# Patient Record
Sex: MALE | Race: WHITE | NOT HISPANIC OR LATINO
[De-identification: names, ages, dates, MRNs, and addresses within clinical notes are randomized per-mention and may not be internally consistent; named-entity substitution may affect disease eponyms.]

---

## 2017-03-17 ENCOUNTER — RX RENEWAL (OUTPATIENT)
Age: 82
End: 2017-03-17

## 2017-04-26 ENCOUNTER — APPOINTMENT (OUTPATIENT)
Dept: INTERNAL MEDICINE | Facility: CLINIC | Age: 82
End: 2017-04-26

## 2017-04-27 ENCOUNTER — APPOINTMENT (OUTPATIENT)
Dept: INTERNAL MEDICINE | Facility: CLINIC | Age: 82
End: 2017-04-27

## 2017-04-27 ENCOUNTER — LABORATORY RESULT (OUTPATIENT)
Age: 82
End: 2017-04-27

## 2017-04-27 VITALS
DIASTOLIC BLOOD PRESSURE: 75 MMHG | RESPIRATION RATE: 16 BRPM | HEART RATE: 76 BPM | SYSTOLIC BLOOD PRESSURE: 130 MMHG | WEIGHT: 202 LBS | BODY MASS INDEX: 28.92 KG/M2 | HEIGHT: 70 IN

## 2017-04-27 DIAGNOSIS — M25.50 PAIN IN UNSPECIFIED JOINT: ICD-10-CM

## 2017-04-27 LAB
ALBUMIN SERPL ELPH-MCNC: 3.5 G/DL
ALP BLD-CCNC: 488 U/L
ALT SERPL-CCNC: 52 U/L
ANION GAP SERPL CALC-SCNC: 16 MMOL/L
AST SERPL-CCNC: 42 U/L
BASOPHILS # BLD AUTO: 0.02 K/UL
BASOPHILS NFR BLD AUTO: 0.2 %
BILIRUB SERPL-MCNC: 0.6 MG/DL
BUN SERPL-MCNC: 38 MG/DL
CALCIUM SERPL-MCNC: 9.2 MG/DL
CHLORIDE SERPL-SCNC: 102 MMOL/L
CO2 SERPL-SCNC: 24 MMOL/L
CREAT SERPL-MCNC: 1.28 MG/DL
EOSINOPHIL # BLD AUTO: 0.13 K/UL
EOSINOPHIL NFR BLD AUTO: 1.5 %
GLUCOSE SERPL-MCNC: 198 MG/DL
HBA1C MFR BLD HPLC: 7.3 %
HCT VFR BLD CALC: 31.7 %
HGB BLD-MCNC: 10.3 G/DL
IMM GRANULOCYTES NFR BLD AUTO: 0.6 %
LYMPHOCYTES # BLD AUTO: 0.57 K/UL
LYMPHOCYTES NFR BLD AUTO: 6.5 %
MAN DIFF?: NORMAL
MCHC RBC-ENTMCNC: 28.6 PG
MCHC RBC-ENTMCNC: 32.5 GM/DL
MCV RBC AUTO: 88.1 FL
MONOCYTES # BLD AUTO: 0.56 K/UL
MONOCYTES NFR BLD AUTO: 6.4 %
NEUTROPHILS # BLD AUTO: 7.47 K/UL
NEUTROPHILS NFR BLD AUTO: 84.8 %
PLATELET # BLD AUTO: 141 K/UL
POTASSIUM SERPL-SCNC: 4.3 MMOL/L
PROT SERPL-MCNC: 7 G/DL
RBC # BLD: 3.6 M/UL
RBC # FLD: 16 %
SODIUM SERPL-SCNC: 142 MMOL/L
URATE SERPL-MCNC: 9.5 MG/DL
WBC # FLD AUTO: 8.8 K/UL

## 2017-04-28 ENCOUNTER — APPOINTMENT (OUTPATIENT)
Dept: CARDIOLOGY | Facility: CLINIC | Age: 82
End: 2017-04-28

## 2017-04-28 ENCOUNTER — NON-APPOINTMENT (OUTPATIENT)
Age: 82
End: 2017-04-28

## 2017-04-28 VITALS
HEIGHT: 70 IN | BODY MASS INDEX: 28.77 KG/M2 | SYSTOLIC BLOOD PRESSURE: 128 MMHG | WEIGHT: 201 LBS | DIASTOLIC BLOOD PRESSURE: 62 MMHG | HEART RATE: 60 BPM

## 2017-04-29 DIAGNOSIS — R76.8 OTHER SPECIFIED ABNORMAL IMMUNOLOGICAL FINDINGS IN SERUM: ICD-10-CM

## 2017-04-30 ENCOUNTER — FORM ENCOUNTER (OUTPATIENT)
Age: 82
End: 2017-04-30

## 2017-05-01 ENCOUNTER — OUTPATIENT (OUTPATIENT)
Dept: OUTPATIENT SERVICES | Facility: HOSPITAL | Age: 82
LOS: 1 days | End: 2017-05-01
Payer: MEDICARE

## 2017-05-01 ENCOUNTER — APPOINTMENT (OUTPATIENT)
Dept: CT IMAGING | Facility: CLINIC | Age: 82
End: 2017-05-01

## 2017-05-01 DIAGNOSIS — Z98.89 OTHER SPECIFIED POSTPROCEDURAL STATES: Chronic | ICD-10-CM

## 2017-05-01 DIAGNOSIS — R94.5 ABNORMAL RESULTS OF LIVER FUNCTION STUDIES: ICD-10-CM

## 2017-05-01 LAB
ALBUMIN MFR SERPL ELPH: 46.4 %
ALBUMIN SERPL ELPH-MCNC: 3.7 G/DL
ALBUMIN SERPL-MCNC: 3.1 G/DL
ALBUMIN/GLOB SERPL: 0.9 RATIO
ALP BLD-CCNC: 530 U/L
ALPHA1 GLOB MFR SERPL ELPH: 6.7 %
ALPHA1 GLOB SERPL ELPH-MCNC: 0.4 G/DL
ALPHA2 GLOB MFR SERPL ELPH: 16.1 %
ALPHA2 GLOB SERPL ELPH-MCNC: 1.1 G/DL
ALT SERPL-CCNC: 54 U/L
ANA PAT FLD IF-IMP: ABNORMAL
ANA SER IF-ACNC: ABNORMAL
AST SERPL-CCNC: 38 U/L
B-GLOBULIN MFR SERPL ELPH: 20.1 %
B-GLOBULIN SERPL ELPH-MCNC: 1.3 G/DL
BILIRUB DIRECT SERPL-MCNC: 0.2 MG/DL
BILIRUB INDIRECT SERPL-MCNC: 0.2 MG/DL
BILIRUB SERPL-MCNC: 0.4 MG/DL
CRP SERPL-MCNC: 2.54 MG/DL
DEPRECATED KAPPA LC FREE/LAMBDA SER: 1.29 RATIO
GAMMA GLOB FLD ELPH-MCNC: 0.7 G/DL
GAMMA GLOB MFR SERPL ELPH: 10.7 %
GGT SERPL-CCNC: 400 U/L
IGA SER QL IEP: 173 MG/DL
IGG SER QL IEP: 840 MG/DL
IGM SER QL IEP: 132 MG/DL
INTERPRETATION SERPL IEP-IMP: NORMAL
KAPPA LC CSF-MCNC: 2.89 MG/DL
KAPPA LC SERPL-MCNC: 3.72 MG/DL
M PROTEIN MFR SERPL ELPH: 0 %
M PROTEIN SPEC IFE-MCNC: NORMAL
MONOCLON BAND OBS SERPL: 0 G/DL
PROT SERPL-MCNC: 6.7 G/DL
RHEUMATOID FACT SER QL: <7 IU/ML

## 2017-05-01 PROCEDURE — 74177 CT ABD & PELVIS W/CONTRAST: CPT

## 2017-05-01 PROCEDURE — 71260 CT THORAX DX C+: CPT

## 2017-05-02 LAB
ALP BLD-CCNC: 458 U/L
ALP BONE CFR SERPL: 13 %
ALP INTEST CFR SERPL: 0 %
ALP LIVER CFR SERPL: 66 %
ALP MACRO CFR SERPL: 20 %
ALP PLAC CFR SERPL: 0 %

## 2017-05-08 ENCOUNTER — RX RENEWAL (OUTPATIENT)
Age: 82
End: 2017-05-08

## 2017-05-09 ENCOUNTER — APPOINTMENT (OUTPATIENT)
Dept: INTERNAL MEDICINE | Facility: CLINIC | Age: 82
End: 2017-05-09

## 2017-05-09 LAB
A1AT SERPL-MCNC: 215 MG/DL
AFP-TM SERPL-MCNC: 1.2 NG/ML
CEA SERPL-MCNC: 1.7 NG/ML
CERULOPLASMIN SERPL-MCNC: 29 MG/DL
DSDNA AB SER-ACNC: <12 IU/ML
FERRITIN SERPL-MCNC: 391 NG/ML
GLIADIN IGA SER QL: <5 UNITS
GLIADIN IGG SER QL: <5 UNITS
GLIADIN PEPTIDE IGA SER-ACNC: NEGATIVE
GLIADIN PEPTIDE IGG SER-ACNC: NEGATIVE
IRON SATN MFR SERPL: 17 %
IRON SERPL-MCNC: 42 UG/DL
TIBC SERPL-MCNC: 252 UG/DL
TTG IGA SER IA-ACNC: <5 UNITS
TTG IGA SER-ACNC: NEGATIVE
TTG IGG SER IA-ACNC: <5 UNITS
TTG IGG SER IA-ACNC: NEGATIVE
UIBC SERPL-MCNC: 210 UG/DL

## 2017-05-10 LAB
ENDOMYSIUM IGA SER QL: NORMAL
ENDOMYSIUM IGA TITR SER: NORMAL

## 2017-05-15 ENCOUNTER — RX RENEWAL (OUTPATIENT)
Age: 82
End: 2017-05-15

## 2017-05-15 ENCOUNTER — APPOINTMENT (OUTPATIENT)
Dept: INTERNAL MEDICINE | Facility: CLINIC | Age: 82
End: 2017-05-15

## 2017-05-15 ENCOUNTER — CHART COPY (OUTPATIENT)
Age: 82
End: 2017-05-15

## 2017-05-15 VITALS — HEIGHT: 70 IN

## 2017-05-15 LAB — MITOCHONDRIA AB SER IF-ACNC: ABNORMAL

## 2017-05-15 RX ORDER — BLOOD-GLUCOSE METER
W/DEVICE EACH MISCELLANEOUS
Qty: 1 | Refills: 0 | Status: ACTIVE | COMMUNITY
Start: 2017-05-09

## 2017-05-30 ENCOUNTER — RESULT REVIEW (OUTPATIENT)
Age: 82
End: 2017-05-30

## 2017-05-30 ENCOUNTER — OUTPATIENT (OUTPATIENT)
Dept: OUTPATIENT SERVICES | Facility: HOSPITAL | Age: 82
LOS: 1 days | End: 2017-05-30
Payer: MEDICARE

## 2017-05-30 ENCOUNTER — OUTPATIENT (OUTPATIENT)
Dept: OUTPATIENT SERVICES | Facility: HOSPITAL | Age: 82
LOS: 1 days | Discharge: HOME | End: 2017-05-30

## 2017-05-30 ENCOUNTER — APPOINTMENT (OUTPATIENT)
Dept: HEMATOLOGY ONCOLOGY | Facility: CLINIC | Age: 82
End: 2017-05-30

## 2017-05-30 DIAGNOSIS — Z98.89 OTHER SPECIFIED POSTPROCEDURAL STATES: Chronic | ICD-10-CM

## 2017-05-30 DIAGNOSIS — D64.9 ANEMIA, UNSPECIFIED: ICD-10-CM

## 2017-05-30 LAB
BASOPHILS # BLD AUTO: 0 K/UL — SIGNIFICANT CHANGE UP (ref 0–0.2)
BASOPHILS NFR BLD AUTO: 0 % — SIGNIFICANT CHANGE UP (ref 0–2)
BLD GP AB SCN SERPL QL: NEGATIVE — SIGNIFICANT CHANGE UP
EOSINOPHIL # BLD AUTO: 0.1 K/UL — SIGNIFICANT CHANGE UP (ref 0–0.5)
EOSINOPHIL NFR BLD AUTO: 1.3 % — SIGNIFICANT CHANGE UP (ref 0–6)
HCT VFR BLD CALC: 23.1 % — LOW (ref 39–50)
HGB BLD-MCNC: 7.7 G/DL — LOW (ref 13–17)
LYMPHOCYTES # BLD AUTO: 0.3 K/UL — LOW (ref 1–3.3)
LYMPHOCYTES # BLD AUTO: 5.3 % — LOW (ref 13–44)
MCHC RBC-ENTMCNC: 30.2 PG — SIGNIFICANT CHANGE UP (ref 27–34)
MCHC RBC-ENTMCNC: 33.2 G/DL — SIGNIFICANT CHANGE UP (ref 32–36)
MCV RBC AUTO: 91 FL — SIGNIFICANT CHANGE UP (ref 80–100)
MONOCYTES # BLD AUTO: 0.4 K/UL — SIGNIFICANT CHANGE UP (ref 0–0.9)
MONOCYTES NFR BLD AUTO: 7.7 % — SIGNIFICANT CHANGE UP (ref 2–14)
NEUTROPHILS # BLD AUTO: 5 K/UL — SIGNIFICANT CHANGE UP (ref 1.8–7.4)
NEUTROPHILS NFR BLD AUTO: 85.7 % — HIGH (ref 43–77)
PLATELET # BLD AUTO: 79 K/UL — LOW (ref 150–400)
RBC # BLD: 2.54 M/UL — LOW (ref 4.2–5.8)
RBC # FLD: 15.4 % — HIGH (ref 10.3–14.5)
RH IG SCN BLD-IMP: POSITIVE — SIGNIFICANT CHANGE UP
WBC # BLD: 5.8 K/UL — SIGNIFICANT CHANGE UP (ref 3.8–10.5)
WBC # FLD AUTO: 5.8 K/UL — SIGNIFICANT CHANGE UP (ref 3.8–10.5)

## 2017-06-01 ENCOUNTER — APPOINTMENT (OUTPATIENT)
Dept: INFUSION THERAPY | Facility: HOSPITAL | Age: 82
End: 2017-06-01

## 2017-06-02 DIAGNOSIS — Z51.89 ENCOUNTER FOR OTHER SPECIFIED AFTERCARE: ICD-10-CM

## 2017-06-09 ENCOUNTER — APPOINTMENT (OUTPATIENT)
Dept: HEMATOLOGY ONCOLOGY | Facility: CLINIC | Age: 82
End: 2017-06-09

## 2017-06-09 LAB
BLD GP AB SCN SERPL QL: NEGATIVE — SIGNIFICANT CHANGE UP
RH IG SCN BLD-IMP: POSITIVE — SIGNIFICANT CHANGE UP

## 2017-06-11 ENCOUNTER — APPOINTMENT (OUTPATIENT)
Dept: INFUSION THERAPY | Facility: HOSPITAL | Age: 82
End: 2017-06-11

## 2017-06-15 ENCOUNTER — APPOINTMENT (OUTPATIENT)
Dept: HEMATOLOGY ONCOLOGY | Facility: CLINIC | Age: 82
End: 2017-06-15

## 2017-06-17 ENCOUNTER — APPOINTMENT (OUTPATIENT)
Dept: INFUSION THERAPY | Facility: HOSPITAL | Age: 82
End: 2017-06-17

## 2017-06-20 DIAGNOSIS — N18.3 CHRONIC KIDNEY DISEASE, STAGE 3 (MODERATE): ICD-10-CM

## 2017-06-20 DIAGNOSIS — E11.9 TYPE 2 DIABETES MELLITUS WITHOUT COMPLICATIONS: ICD-10-CM

## 2017-06-20 DIAGNOSIS — E11.59 TYPE 2 DIABETES MELLITUS WITH OTHER CIRCULATORY COMPLICATIONS: ICD-10-CM

## 2017-06-26 ENCOUNTER — RX RENEWAL (OUTPATIENT)
Age: 82
End: 2017-06-26

## 2017-06-26 ENCOUNTER — APPOINTMENT (OUTPATIENT)
Dept: INTERNAL MEDICINE | Facility: CLINIC | Age: 82
End: 2017-06-26

## 2017-06-26 LAB
ALBUMIN SERPL ELPH-MCNC: 3.1 G/DL
ALP BLD-CCNC: 188 U/L
ALT SERPL-CCNC: 13 U/L
AST SERPL-CCNC: 18 U/L
BILIRUB DIRECT SERPL-MCNC: 0.2 MG/DL
BILIRUB INDIRECT SERPL-MCNC: 0.3 MG/DL
BILIRUB SERPL-MCNC: 0.5 MG/DL
CHOLEST SERPL-MCNC: 80 MG/DL
CHOLEST/HDLC SERPL: 3.3 RATIO
GGT SERPL-CCNC: 88 U/L
HDLC SERPL-MCNC: 24 MG/DL
LDLC SERPL CALC-MCNC: 37 MG/DL
PROT SERPL-MCNC: 6.1 G/DL
TRIGL SERPL-MCNC: 95 MG/DL

## 2017-06-28 ENCOUNTER — APPOINTMENT (OUTPATIENT)
Dept: HEMATOLOGY ONCOLOGY | Facility: CLINIC | Age: 82
End: 2017-06-28

## 2017-06-28 PROCEDURE — 86901 BLOOD TYPING SEROLOGIC RH(D): CPT

## 2017-06-28 PROCEDURE — 86900 BLOOD TYPING SEROLOGIC ABO: CPT

## 2017-06-28 PROCEDURE — 86923 COMPATIBILITY TEST ELECTRIC: CPT

## 2017-06-28 PROCEDURE — 86850 RBC ANTIBODY SCREEN: CPT

## 2017-06-29 ENCOUNTER — OUTPATIENT (OUTPATIENT)
Dept: OUTPATIENT SERVICES | Facility: HOSPITAL | Age: 82
LOS: 1 days | Discharge: HOME | End: 2017-06-29

## 2017-06-29 ENCOUNTER — OUTPATIENT (OUTPATIENT)
Dept: OUTPATIENT SERVICES | Facility: HOSPITAL | Age: 82
LOS: 1 days | End: 2017-06-29
Payer: MEDICARE

## 2017-06-29 DIAGNOSIS — D64.9 ANEMIA, UNSPECIFIED: ICD-10-CM

## 2017-06-29 DIAGNOSIS — Z98.89 OTHER SPECIFIED POSTPROCEDURAL STATES: Chronic | ICD-10-CM

## 2017-07-01 ENCOUNTER — APPOINTMENT (OUTPATIENT)
Dept: INFUSION THERAPY | Facility: HOSPITAL | Age: 82
End: 2017-07-01

## 2017-07-03 DIAGNOSIS — Z51.89 ENCOUNTER FOR OTHER SPECIFIED AFTERCARE: ICD-10-CM

## 2017-07-03 DIAGNOSIS — I48.0 PAROXYSMAL ATRIAL FIBRILLATION: ICD-10-CM

## 2017-07-03 DIAGNOSIS — E11.59 TYPE 2 DIABETES MELLITUS WITH OTHER CIRCULATORY COMPLICATIONS: ICD-10-CM

## 2017-07-03 DIAGNOSIS — R11.2 NAUSEA WITH VOMITING, UNSPECIFIED: ICD-10-CM

## 2017-07-10 RX ORDER — CARVEDILOL PHOSPHATE 80 MG/1
0 CAPSULE, EXTENDED RELEASE ORAL
Qty: 180 | Refills: 0 | COMMUNITY
Start: 2017-07-10

## 2017-07-10 RX ORDER — CARVEDILOL PHOSPHATE 80 MG/1
1 CAPSULE, EXTENDED RELEASE ORAL
Qty: 180 | Refills: 0 | DISCHARGE
Start: 2017-07-10

## 2017-07-13 ENCOUNTER — APPOINTMENT (OUTPATIENT)
Dept: HEMATOLOGY ONCOLOGY | Facility: CLINIC | Age: 82
End: 2017-07-13

## 2017-07-15 ENCOUNTER — APPOINTMENT (OUTPATIENT)
Dept: INFUSION THERAPY | Facility: HOSPITAL | Age: 82
End: 2017-07-15

## 2017-07-20 ENCOUNTER — APPOINTMENT (OUTPATIENT)
Dept: INTERNAL MEDICINE | Facility: CLINIC | Age: 82
End: 2017-07-20

## 2017-07-20 ENCOUNTER — APPOINTMENT (OUTPATIENT)
Dept: HEMATOLOGY ONCOLOGY | Facility: CLINIC | Age: 82
End: 2017-07-20

## 2017-07-20 VITALS — DIASTOLIC BLOOD PRESSURE: 60 MMHG | SYSTOLIC BLOOD PRESSURE: 130 MMHG | HEART RATE: 80 BPM | RESPIRATION RATE: 18 BRPM

## 2017-07-20 LAB
BLD GP AB SCN SERPL QL: NEGATIVE — SIGNIFICANT CHANGE UP
DATE COLLECTED: NORMAL
HEMOCCULT SP1 STL QL: POSITIVE
RH IG SCN BLD-IMP: POSITIVE — SIGNIFICANT CHANGE UP

## 2017-07-20 RX ORDER — SODIUM SULFATE, POTASSIUM SULFATE, MAGNESIUM SULFATE 17.5; 3.13; 1.6 G/ML; G/ML; G/ML
17.5-3.13-1.6 SOLUTION, CONCENTRATE ORAL
Qty: 354 | Refills: 0 | Status: DISCONTINUED | COMMUNITY
Start: 2017-06-02

## 2017-07-22 ENCOUNTER — APPOINTMENT (OUTPATIENT)
Dept: INFUSION THERAPY | Facility: HOSPITAL | Age: 82
End: 2017-07-22

## 2017-07-24 ENCOUNTER — APPOINTMENT (OUTPATIENT)
Dept: INTERNAL MEDICINE | Facility: CLINIC | Age: 82
End: 2017-07-24

## 2017-07-24 VITALS — HEART RATE: 80 BPM | DIASTOLIC BLOOD PRESSURE: 68 MMHG | SYSTOLIC BLOOD PRESSURE: 120 MMHG | RESPIRATION RATE: 16 BRPM

## 2017-07-24 LAB — HEMOGLOBIN: 8.2

## 2017-07-27 ENCOUNTER — OUTPATIENT (OUTPATIENT)
Dept: OUTPATIENT SERVICES | Facility: HOSPITAL | Age: 82
LOS: 1 days | Discharge: HOME | End: 2017-07-27

## 2017-07-27 ENCOUNTER — APPOINTMENT (OUTPATIENT)
Dept: CARDIOLOGY | Facility: CLINIC | Age: 82
End: 2017-07-27
Payer: MEDICARE

## 2017-07-27 ENCOUNTER — NON-APPOINTMENT (OUTPATIENT)
Age: 82
End: 2017-07-27

## 2017-07-27 VITALS
DIASTOLIC BLOOD PRESSURE: 65 MMHG | OXYGEN SATURATION: 95 % | HEART RATE: 95 BPM | WEIGHT: 222 LBS | BODY MASS INDEX: 31.78 KG/M2 | SYSTOLIC BLOOD PRESSURE: 130 MMHG | HEIGHT: 70 IN | TEMPERATURE: 97.9 F

## 2017-07-27 DIAGNOSIS — I10 ESSENTIAL (PRIMARY) HYPERTENSION: ICD-10-CM

## 2017-07-27 DIAGNOSIS — Z98.89 OTHER SPECIFIED POSTPROCEDURAL STATES: Chronic | ICD-10-CM

## 2017-07-27 DIAGNOSIS — D64.9 ANEMIA, UNSPECIFIED: ICD-10-CM

## 2017-07-27 PROCEDURE — 99215 OFFICE O/P EST HI 40 MIN: CPT

## 2017-07-27 PROCEDURE — 93000 ELECTROCARDIOGRAM COMPLETE: CPT

## 2017-07-28 ENCOUNTER — RESULT REVIEW (OUTPATIENT)
Age: 82
End: 2017-07-28

## 2017-07-28 ENCOUNTER — APPOINTMENT (OUTPATIENT)
Dept: HEMATOLOGY ONCOLOGY | Facility: CLINIC | Age: 82
End: 2017-07-28

## 2017-07-28 PROCEDURE — 86900 BLOOD TYPING SEROLOGIC ABO: CPT

## 2017-07-28 PROCEDURE — 86850 RBC ANTIBODY SCREEN: CPT

## 2017-07-28 PROCEDURE — 86901 BLOOD TYPING SEROLOGIC RH(D): CPT

## 2017-07-28 PROCEDURE — 86923 COMPATIBILITY TEST ELECTRIC: CPT

## 2017-07-30 ENCOUNTER — OUTPATIENT (OUTPATIENT)
Dept: OUTPATIENT SERVICES | Facility: HOSPITAL | Age: 82
LOS: 1 days | End: 2017-07-30
Payer: MEDICARE

## 2017-07-30 DIAGNOSIS — Z98.89 OTHER SPECIFIED POSTPROCEDURAL STATES: Chronic | ICD-10-CM

## 2017-07-31 ENCOUNTER — APPOINTMENT (OUTPATIENT)
Dept: INFUSION THERAPY | Facility: HOSPITAL | Age: 82
End: 2017-07-31

## 2017-08-01 DIAGNOSIS — Z51.89 ENCOUNTER FOR OTHER SPECIFIED AFTERCARE: ICD-10-CM

## 2017-08-03 ENCOUNTER — APPOINTMENT (OUTPATIENT)
Dept: INTERNAL MEDICINE | Facility: CLINIC | Age: 82
End: 2017-08-03

## 2017-08-04 ENCOUNTER — RESULT REVIEW (OUTPATIENT)
Age: 82
End: 2017-08-04

## 2017-08-04 ENCOUNTER — APPOINTMENT (OUTPATIENT)
Dept: HEMATOLOGY ONCOLOGY | Facility: CLINIC | Age: 82
End: 2017-08-04

## 2017-08-04 ENCOUNTER — CLINICAL ADVICE (OUTPATIENT)
Age: 82
End: 2017-08-04

## 2017-08-04 DIAGNOSIS — D64.9 ANEMIA, UNSPECIFIED: ICD-10-CM

## 2017-08-07 ENCOUNTER — APPOINTMENT (OUTPATIENT)
Age: 82
End: 2017-08-07

## 2017-08-08 DIAGNOSIS — R11.2 NAUSEA WITH VOMITING, UNSPECIFIED: ICD-10-CM

## 2017-08-10 ENCOUNTER — APPOINTMENT (OUTPATIENT)
Dept: INTERNAL MEDICINE | Facility: CLINIC | Age: 82
End: 2017-08-10
Payer: MEDICARE

## 2017-08-10 VITALS
WEIGHT: 234 LBS | DIASTOLIC BLOOD PRESSURE: 60 MMHG | RESPIRATION RATE: 18 BRPM | BODY MASS INDEX: 33.58 KG/M2 | HEART RATE: 80 BPM | SYSTOLIC BLOOD PRESSURE: 120 MMHG

## 2017-08-10 LAB — HEMOGLOBIN: 7

## 2017-08-10 PROCEDURE — 85018 HEMOGLOBIN: CPT | Mod: QW

## 2017-08-10 PROCEDURE — 99215 OFFICE O/P EST HI 40 MIN: CPT | Mod: 25

## 2017-08-10 RX ORDER — FUROSEMIDE 40 MG
0 TABLET ORAL
Qty: 90 | Refills: 0 | COMMUNITY
Start: 2017-08-10

## 2017-08-10 RX ORDER — FUROSEMIDE 40 MG
1 TABLET ORAL
Qty: 90 | Refills: 0 | COMMUNITY
Start: 2017-08-10

## 2017-08-11 ENCOUNTER — APPOINTMENT (OUTPATIENT)
Dept: HEMATOLOGY ONCOLOGY | Facility: CLINIC | Age: 82
End: 2017-08-11

## 2017-08-14 ENCOUNTER — APPOINTMENT (OUTPATIENT)
Age: 82
End: 2017-08-14

## 2017-08-14 ENCOUNTER — RX RENEWAL (OUTPATIENT)
Age: 82
End: 2017-08-14

## 2017-08-17 ENCOUNTER — RX RENEWAL (OUTPATIENT)
Age: 82
End: 2017-08-17

## 2017-08-18 ENCOUNTER — APPOINTMENT (OUTPATIENT)
Dept: HEMATOLOGY ONCOLOGY | Facility: CLINIC | Age: 82
End: 2017-08-18

## 2017-08-18 ENCOUNTER — APPOINTMENT (OUTPATIENT)
Dept: INTERNAL MEDICINE | Facility: CLINIC | Age: 82
End: 2017-08-18

## 2017-08-18 ENCOUNTER — APPOINTMENT (OUTPATIENT)
Dept: INTERNAL MEDICINE | Facility: CLINIC | Age: 82
End: 2017-08-18
Payer: MEDICARE

## 2017-08-18 ENCOUNTER — RESULT REVIEW (OUTPATIENT)
Age: 82
End: 2017-08-18

## 2017-08-18 VITALS
RESPIRATION RATE: 16 BRPM | BODY MASS INDEX: 32.86 KG/M2 | DIASTOLIC BLOOD PRESSURE: 60 MMHG | WEIGHT: 229 LBS | SYSTOLIC BLOOD PRESSURE: 110 MMHG | HEART RATE: 80 BPM

## 2017-08-18 DIAGNOSIS — S80.829A BLISTER (NONTHERMAL), UNSPECIFIED LOWER LEG, INITIAL ENCOUNTER: ICD-10-CM

## 2017-08-18 LAB
BASOPHILS # BLD AUTO: 0 K/UL — SIGNIFICANT CHANGE UP (ref 0–0.2)
BASOPHILS NFR BLD AUTO: 0 % — SIGNIFICANT CHANGE UP (ref 0–2)
BLD GP AB SCN SERPL QL: NEGATIVE — SIGNIFICANT CHANGE UP
EOSINOPHIL # BLD AUTO: 0.2 K/UL — SIGNIFICANT CHANGE UP (ref 0–0.5)
EOSINOPHIL NFR BLD AUTO: 2.8 % — SIGNIFICANT CHANGE UP (ref 0–6)
HCT VFR BLD CALC: 21.7 % — LOW (ref 39–50)
HGB BLD-MCNC: 7.3 G/DL — LOW (ref 13–17)
LYMPHOCYTES # BLD AUTO: 0.5 K/UL — LOW (ref 1–3.3)
LYMPHOCYTES # BLD AUTO: 7.7 % — LOW (ref 13–44)
MCHC RBC-ENTMCNC: 31.4 PG — SIGNIFICANT CHANGE UP (ref 27–34)
MCHC RBC-ENTMCNC: 33.5 G/DL — SIGNIFICANT CHANGE UP (ref 32–36)
MCV RBC AUTO: 93.7 FL — SIGNIFICANT CHANGE UP (ref 80–100)
MONOCYTES # BLD AUTO: 0.2 K/UL — SIGNIFICANT CHANGE UP (ref 0–0.9)
MONOCYTES NFR BLD AUTO: 3.1 % — SIGNIFICANT CHANGE UP (ref 2–14)
NEUTROPHILS # BLD AUTO: 5.2 K/UL — SIGNIFICANT CHANGE UP (ref 1.8–7.4)
NEUTROPHILS NFR BLD AUTO: 86.5 % — HIGH (ref 43–77)
PLATELET # BLD AUTO: 109 K/UL — LOW (ref 150–400)
RBC # BLD: 2.32 M/UL — LOW (ref 4.2–5.8)
RBC # FLD: 17.3 % — HIGH (ref 10.3–14.5)
RH IG SCN BLD-IMP: POSITIVE — SIGNIFICANT CHANGE UP
WBC # BLD: 6 K/UL — SIGNIFICANT CHANGE UP (ref 3.8–10.5)
WBC # FLD AUTO: 6 K/UL — SIGNIFICANT CHANGE UP (ref 3.8–10.5)

## 2017-08-18 PROCEDURE — 99214 OFFICE O/P EST MOD 30 MIN: CPT

## 2017-08-18 RX ORDER — INSULIN ASPART 100 [IU]/ML
100 INJECTION, SOLUTION INTRAVENOUS; SUBCUTANEOUS
Qty: 1 | Refills: 5 | Status: DISCONTINUED | COMMUNITY
Start: 2017-05-15 | End: 2017-08-18

## 2017-08-19 ENCOUNTER — APPOINTMENT (OUTPATIENT)
Dept: INFUSION THERAPY | Facility: HOSPITAL | Age: 82
End: 2017-08-19

## 2017-08-22 ENCOUNTER — APPOINTMENT (OUTPATIENT)
Dept: HEMATOLOGY ONCOLOGY | Facility: CLINIC | Age: 82
End: 2017-08-22

## 2017-08-22 DIAGNOSIS — K92.2 GASTROINTESTINAL HEMORRHAGE, UNSPECIFIED: ICD-10-CM

## 2017-08-23 PROCEDURE — 86901 BLOOD TYPING SEROLOGIC RH(D): CPT

## 2017-08-23 PROCEDURE — 86900 BLOOD TYPING SEROLOGIC ABO: CPT

## 2017-08-23 PROCEDURE — 86850 RBC ANTIBODY SCREEN: CPT

## 2017-08-23 PROCEDURE — 86923 COMPATIBILITY TEST ELECTRIC: CPT

## 2017-08-24 ENCOUNTER — APPOINTMENT (OUTPATIENT)
Age: 82
End: 2017-08-24

## 2017-08-31 ENCOUNTER — OUTPATIENT (OUTPATIENT)
Dept: OUTPATIENT SERVICES | Facility: HOSPITAL | Age: 82
LOS: 1 days | Discharge: HOME | End: 2017-08-31

## 2017-08-31 DIAGNOSIS — D64.9 ANEMIA, UNSPECIFIED: ICD-10-CM

## 2017-08-31 DIAGNOSIS — Z98.89 OTHER SPECIFIED POSTPROCEDURAL STATES: Chronic | ICD-10-CM

## 2017-09-01 ENCOUNTER — OUTPATIENT (OUTPATIENT)
Dept: OUTPATIENT SERVICES | Facility: HOSPITAL | Age: 82
LOS: 1 days | End: 2017-09-01
Payer: MEDICARE

## 2017-09-01 ENCOUNTER — APPOINTMENT (OUTPATIENT)
Dept: HEMATOLOGY ONCOLOGY | Facility: CLINIC | Age: 82
End: 2017-09-01

## 2017-09-01 DIAGNOSIS — D64.9 ANEMIA, UNSPECIFIED: ICD-10-CM

## 2017-09-01 DIAGNOSIS — Z98.89 OTHER SPECIFIED POSTPROCEDURAL STATES: Chronic | ICD-10-CM

## 2017-09-01 DIAGNOSIS — K92.2 GASTROINTESTINAL HEMORRHAGE, UNSPECIFIED: ICD-10-CM

## 2017-09-01 LAB
ANTIBODY ID 1_1: SIGNIFICANT CHANGE UP
ANTIBODY ID 1_2: SIGNIFICANT CHANGE UP
BLD GP AB SCN SERPL QL: POSITIVE — SIGNIFICANT CHANGE UP
DAT POLY-SP REAG RBC QL: NEGATIVE — SIGNIFICANT CHANGE UP
RH IG SCN BLD-IMP: POSITIVE — SIGNIFICANT CHANGE UP

## 2017-09-01 PROCEDURE — 86880 COOMBS TEST DIRECT: CPT

## 2017-09-01 PROCEDURE — 86870 RBC ANTIBODY IDENTIFICATION: CPT

## 2017-09-01 PROCEDURE — 86850 RBC ANTIBODY SCREEN: CPT

## 2017-09-01 PROCEDURE — 86077 PHYS BLOOD BANK SERV XMATCH: CPT

## 2017-09-01 PROCEDURE — 86900 BLOOD TYPING SEROLOGIC ABO: CPT

## 2017-09-01 PROCEDURE — 86901 BLOOD TYPING SEROLOGIC RH(D): CPT

## 2017-09-02 ENCOUNTER — OTHER (OUTPATIENT)
Age: 82
End: 2017-09-02

## 2017-09-02 ENCOUNTER — APPOINTMENT (OUTPATIENT)
Age: 82
End: 2017-09-02

## 2017-09-02 RX ORDER — DILTIAZEM HCL 120 MG
0 CAPSULE, EXT RELEASE 24 HR ORAL
Qty: 90 | Refills: 0 | COMMUNITY
Start: 2017-09-02

## 2017-09-02 RX ORDER — OCTREOTIDE ACETATE 200 UG/ML
0 INJECTION, SOLUTION INTRAVENOUS; SUBCUTANEOUS
Qty: 60 | Refills: 0 | DISCHARGE
Start: 2017-09-02

## 2017-09-05 ENCOUNTER — INPATIENT (INPATIENT)
Facility: HOSPITAL | Age: 82
LOS: 1 days | Discharge: HOME | DRG: 378 | End: 2017-09-07
Attending: HOSPITALIST | Admitting: HOSPITALIST
Payer: MEDICARE

## 2017-09-05 ENCOUNTER — APPOINTMENT (OUTPATIENT)
Dept: INTERNAL MEDICINE | Facility: CLINIC | Age: 82
End: 2017-09-05

## 2017-09-05 VITALS
DIASTOLIC BLOOD PRESSURE: 62 MMHG | TEMPERATURE: 98 F | SYSTOLIC BLOOD PRESSURE: 100 MMHG | HEART RATE: 105 BPM | OXYGEN SATURATION: 97 % | RESPIRATION RATE: 18 BRPM

## 2017-09-05 DIAGNOSIS — Z29.9 ENCOUNTER FOR PROPHYLACTIC MEASURES, UNSPECIFIED: ICD-10-CM

## 2017-09-05 DIAGNOSIS — Z98.89 OTHER SPECIFIED POSTPROCEDURAL STATES: Chronic | ICD-10-CM

## 2017-09-05 DIAGNOSIS — D62 ACUTE POSTHEMORRHAGIC ANEMIA: ICD-10-CM

## 2017-09-05 DIAGNOSIS — N17.9 ACUTE KIDNEY FAILURE, UNSPECIFIED: ICD-10-CM

## 2017-09-05 DIAGNOSIS — E11.22 TYPE 2 DIABETES MELLITUS WITH DIABETIC CHRONIC KIDNEY DISEASE: ICD-10-CM

## 2017-09-05 DIAGNOSIS — I25.10 ATHEROSCLEROTIC HEART DISEASE OF NATIVE CORONARY ARTERY WITHOUT ANGINA PECTORIS: ICD-10-CM

## 2017-09-05 DIAGNOSIS — I48.0 PAROXYSMAL ATRIAL FIBRILLATION: ICD-10-CM

## 2017-09-05 DIAGNOSIS — D69.6 THROMBOCYTOPENIA, UNSPECIFIED: ICD-10-CM

## 2017-09-05 DIAGNOSIS — K92.2 GASTROINTESTINAL HEMORRHAGE, UNSPECIFIED: ICD-10-CM

## 2017-09-05 DIAGNOSIS — Z71.89 OTHER SPECIFIED COUNSELING: ICD-10-CM

## 2017-09-05 DIAGNOSIS — D50.0 IRON DEFICIENCY ANEMIA SECONDARY TO BLOOD LOSS (CHRONIC): ICD-10-CM

## 2017-09-05 LAB
ALBUMIN SERPL ELPH-MCNC: 3.3 G/DL — SIGNIFICANT CHANGE UP (ref 3.3–5)
ALP SERPL-CCNC: 187 U/L — HIGH (ref 40–120)
ALT FLD-CCNC: 16 U/L RC — SIGNIFICANT CHANGE UP (ref 10–45)
ANION GAP SERPL CALC-SCNC: 16 MMOL/L — SIGNIFICANT CHANGE UP (ref 5–17)
APTT BLD: 32.9 SEC — SIGNIFICANT CHANGE UP (ref 27.5–37.4)
AST SERPL-CCNC: 20 U/L — SIGNIFICANT CHANGE UP (ref 10–40)
BASOPHILS # BLD AUTO: 0 K/UL — SIGNIFICANT CHANGE UP (ref 0–0.2)
BILIRUB SERPL-MCNC: 0.4 MG/DL — SIGNIFICANT CHANGE UP (ref 0.2–1.2)
BLD GP AB SCN SERPL QL: POSITIVE — SIGNIFICANT CHANGE UP
BUN SERPL-MCNC: 59 MG/DL — HIGH (ref 7–23)
CALCIUM SERPL-MCNC: 8.6 MG/DL — SIGNIFICANT CHANGE UP (ref 8.4–10.5)
CHLORIDE SERPL-SCNC: 108 MMOL/L — SIGNIFICANT CHANGE UP (ref 96–108)
CO2 SERPL-SCNC: 19 MMOL/L — LOW (ref 22–31)
CREAT SERPL-MCNC: 1.72 MG/DL — HIGH (ref 0.5–1.3)
EOSINOPHIL # BLD AUTO: 0.1 K/UL — SIGNIFICANT CHANGE UP (ref 0–0.5)
EOSINOPHIL NFR BLD AUTO: 1 % — SIGNIFICANT CHANGE UP (ref 0–6)
GAS PNL BLDV: SIGNIFICANT CHANGE UP
GLUCOSE SERPL-MCNC: 148 MG/DL — HIGH (ref 70–99)
HCT VFR BLD CALC: 19.4 % — CRITICAL LOW (ref 39–50)
HGB BLD-MCNC: 6.2 G/DL — CRITICAL LOW (ref 13–17)
INR BLD: 1.25 RATIO — HIGH (ref 0.88–1.16)
LYMPHOCYTES # BLD AUTO: 0.5 K/UL — LOW (ref 1–3.3)
LYMPHOCYTES # BLD AUTO: 5 % — LOW (ref 13–44)
MCHC RBC-ENTMCNC: 30.6 PG — SIGNIFICANT CHANGE UP (ref 27–34)
MCHC RBC-ENTMCNC: 32 GM/DL — SIGNIFICANT CHANGE UP (ref 32–36)
MCV RBC AUTO: 95.4 FL — SIGNIFICANT CHANGE UP (ref 80–100)
MONOCYTES # BLD AUTO: 0.7 K/UL — SIGNIFICANT CHANGE UP (ref 0–0.9)
MONOCYTES NFR BLD AUTO: 6 % — SIGNIFICANT CHANGE UP (ref 2–14)
NEUTROPHILS # BLD AUTO: 5.9 K/UL — SIGNIFICANT CHANGE UP (ref 1.8–7.4)
NEUTROPHILS NFR BLD AUTO: 88 % — HIGH (ref 43–77)
PLATELET # BLD AUTO: 129 K/UL — LOW (ref 150–400)
POTASSIUM SERPL-MCNC: 4.7 MMOL/L — SIGNIFICANT CHANGE UP (ref 3.5–5.3)
POTASSIUM SERPL-SCNC: 4.7 MMOL/L — SIGNIFICANT CHANGE UP (ref 3.5–5.3)
PROT SERPL-MCNC: 5.8 G/DL — LOW (ref 6–8.3)
PROTHROM AB SERPL-ACNC: 13.6 SEC — HIGH (ref 9.8–12.7)
RBC # BLD: 2.03 M/UL — LOW (ref 4.2–5.8)
RBC # FLD: 15.6 % — HIGH (ref 10.3–14.5)
RH IG SCN BLD-IMP: POSITIVE — SIGNIFICANT CHANGE UP
SODIUM SERPL-SCNC: 143 MMOL/L — SIGNIFICANT CHANGE UP (ref 135–145)
WBC # BLD: 7.2 K/UL — SIGNIFICANT CHANGE UP (ref 3.8–10.5)
WBC # FLD AUTO: 7.2 K/UL — SIGNIFICANT CHANGE UP (ref 3.8–10.5)

## 2017-09-05 PROCEDURE — 99223 1ST HOSP IP/OBS HIGH 75: CPT | Mod: GC

## 2017-09-05 PROCEDURE — 99285 EMERGENCY DEPT VISIT HI MDM: CPT | Mod: GC

## 2017-09-05 PROCEDURE — 99497 ADVNCD CARE PLAN 30 MIN: CPT | Mod: 25

## 2017-09-05 RX ORDER — CARVEDILOL PHOSPHATE 80 MG/1
6.25 CAPSULE, EXTENDED RELEASE ORAL EVERY 12 HOURS
Qty: 0 | Refills: 0 | Status: DISCONTINUED | OUTPATIENT
Start: 2017-09-05 | End: 2017-09-05

## 2017-09-05 RX ORDER — GLUCAGON INJECTION, SOLUTION 0.5 MG/.1ML
1 INJECTION, SOLUTION SUBCUTANEOUS ONCE
Qty: 0 | Refills: 0 | Status: DISCONTINUED | OUTPATIENT
Start: 2017-09-05 | End: 2017-09-07

## 2017-09-05 RX ORDER — DEXTROSE 50 % IN WATER 50 %
1 SYRINGE (ML) INTRAVENOUS ONCE
Qty: 0 | Refills: 0 | Status: DISCONTINUED | OUTPATIENT
Start: 2017-09-05 | End: 2017-09-07

## 2017-09-05 RX ORDER — PANTOPRAZOLE SODIUM 20 MG/1
40 TABLET, DELAYED RELEASE ORAL ONCE
Qty: 0 | Refills: 0 | Status: COMPLETED | OUTPATIENT
Start: 2017-09-05 | End: 2017-09-05

## 2017-09-05 RX ORDER — INSULIN LISPRO 100/ML
VIAL (ML) SUBCUTANEOUS EVERY 6 HOURS
Qty: 0 | Refills: 0 | Status: DISCONTINUED | OUTPATIENT
Start: 2017-09-05 | End: 2017-09-06

## 2017-09-05 RX ORDER — PANTOPRAZOLE SODIUM 20 MG/1
40 TABLET, DELAYED RELEASE ORAL EVERY 12 HOURS
Qty: 0 | Refills: 0 | Status: DISCONTINUED | OUTPATIENT
Start: 2017-09-05 | End: 2017-09-06

## 2017-09-05 RX ORDER — ATORVASTATIN CALCIUM 80 MG/1
20 TABLET, FILM COATED ORAL AT BEDTIME
Qty: 0 | Refills: 0 | Status: DISCONTINUED | OUTPATIENT
Start: 2017-09-05 | End: 2017-09-07

## 2017-09-05 RX ORDER — DEXTROSE 50 % IN WATER 50 %
25 SYRINGE (ML) INTRAVENOUS ONCE
Qty: 0 | Refills: 0 | Status: DISCONTINUED | OUTPATIENT
Start: 2017-09-05 | End: 2017-09-07

## 2017-09-05 RX ORDER — ASPIRIN/CALCIUM CARB/MAGNESIUM 324 MG
81 TABLET ORAL DAILY
Qty: 0 | Refills: 0 | Status: DISCONTINUED | OUTPATIENT
Start: 2017-09-05 | End: 2017-09-05

## 2017-09-05 RX ORDER — CARVEDILOL PHOSPHATE 80 MG/1
6.25 CAPSULE, EXTENDED RELEASE ORAL EVERY 12 HOURS
Qty: 0 | Refills: 0 | Status: DISCONTINUED | OUTPATIENT
Start: 2017-09-05 | End: 2017-09-07

## 2017-09-05 RX ORDER — OCTREOTIDE ACETATE 200 UG/ML
50 INJECTION, SOLUTION INTRAVENOUS; SUBCUTANEOUS EVERY 8 HOURS
Qty: 0 | Refills: 0 | Status: DISCONTINUED | OUTPATIENT
Start: 2017-09-05 | End: 2017-09-07

## 2017-09-05 RX ORDER — ASCORBIC ACID 60 MG
500 TABLET,CHEWABLE ORAL DAILY
Qty: 0 | Refills: 0 | Status: DISCONTINUED | OUTPATIENT
Start: 2017-09-05 | End: 2017-09-07

## 2017-09-05 RX ORDER — MULTIVIT-MIN/FERROUS GLUCONATE 9 MG/15 ML
1 LIQUID (ML) ORAL DAILY
Qty: 0 | Refills: 0 | Status: DISCONTINUED | OUTPATIENT
Start: 2017-09-05 | End: 2017-09-07

## 2017-09-05 RX ADMIN — ATORVASTATIN CALCIUM 20 MILLIGRAM(S): 80 TABLET, FILM COATED ORAL at 23:01

## 2017-09-05 RX ADMIN — PANTOPRAZOLE SODIUM 40 MILLIGRAM(S): 20 TABLET, DELAYED RELEASE ORAL at 19:16

## 2017-09-05 RX ADMIN — Medication 500 MILLIGRAM(S): at 23:01

## 2017-09-05 NOTE — ED PROVIDER NOTE - PHYSICAL EXAMINATION
PE: CONSTITUTIONAL: Nontoxic, in no apparent distress. ENMT: Airway patent, nasal mucosa clear, mouth with normal mucosa. HEAD: NCAT EYES: PERRL, EOMI bilaterally CARDIAC: RRR, no m/r/g, no pedal edema RESPIRATORY: CTA bilaterally, no adventitious sounds GI: Abdomen non-distended, non-tender, + melena, +guaiac positive stool MSK: Spine appears normal, range of motion is not limited, no muscle/joint tenderness NEURO: CNII-XII grossly intact, 5/5 strength, full sensation all extremities, gait intact SKIN: Skin tone pale, warm and dry. No evidence of rash.

## 2017-09-05 NOTE — ED PROVIDER NOTE - NS ED ROS FT
ROS: GENERAL: no fevers, no chills HEENT: no epistaxis, no eye pain, no ear pain, no throat pain CARDIAC: no chest pain, no shortness of breath PULM: no cough, no shortness of breath GI: no nausea, no vomiting, no diarrhea, no abdominal pain, no hematemesis, + melena : no dysuria, no hematuria EXTREMITIES: no arm pain, no leg pain, no back pain SKIN: no purpura, no petechiae, +pale skin NEURO: no headache, no neck pain, no loss of strength/sensation HEME: no easy bruising, no easy bleeding

## 2017-09-05 NOTE — H&P ADULT - PROBLEM SELECTOR PROBLEM 5
Type 2 diabetes mellitus with stage 3 chronic kidney disease, without long-term current use of insulin PAF (paroxysmal atrial fibrillation)

## 2017-09-05 NOTE — ED PROVIDER NOTE - ATTENDING CONTRIBUTION TO CARE
attending Bc:  82yM chronic anemia with known slow GI bleed diagnosed on capsule endoscopy sent in for worsening anemia. Unable to get scheduled transfusion 3 days ago. Denies syncope, chest pain, SOB, dizziness. On exam, VSS, pale skin and conjunctiva, abdomen soft, lungs clear. Will obtain ekg, labs including type and screen, transfusion and admission.

## 2017-09-05 NOTE — H&P ADULT - PROBLEM SELECTOR PLAN 5
- hold home oral med  - will start ISS, plan to reassess - cont w/ cardizem. appears stable - c/w coreg; hold long-acting cardizem and if stable H&H tomorrow will resume it tomorrow.  -holding all anticoagulation.

## 2017-09-05 NOTE — H&P ADULT - HISTORY OF PRESENT ILLNESS
80 y/o male with PMH of HTN, HLD, DM type2, controlled with oral hypoglycemic meds, A1c 6.4, MI ( 1970), CAD with stent placement in June 2015, Paroxysmal Afib, (Coumadin???), Lymphedema, Iron deficiency anemia, p/w lower intestine GIB found on capsule endoscopy    PCP: Dr. Carrillo Lane  GI: Dr. Valeriano Santos ([facility]) 80 y/o male with PMH of HTN, HLD, DM type2, controlled with oral hypoglycemic meds, A1c 6.4, MI ( 1970), CAD with stent placement in June 2015, Paroxysmal Afib, (Coumadin???), Lymphedema, Iron deficiency anemia, p/w lower intestine GIB found on capsule endoscopy    Of note, pt currently off warfarin and plavix given chronic GIB and transfusion requirement, being evaluated for Watchman device.     In ED   Tm 98.3, HR 91 - 105, -135/62-69, RR 18, Sat 98%  Pt received 2u pRBC and protonix 40mg IVP once    PCP: Dr. Carrillo Lane  GI: Dr. Valeriano Santos ([facility])  Cardio: Dr. Agrawal 80 y/o male with PMH of HTN, HLD, DM type2, controlled with oral hypoglycemic meds, A1c 6.4, MI ( 1970), CAD with stent placement in June 2015, pAfib (no AC 2/2 GIB), Lymphedema, Iron deficiency anemia, sent in for outpt labs showing hgb of ~ 5.6. Pt has intestinal GIB found on capsule endoscopy this past week.   Last c-scope 2 weeks ago. Last upper endoscopy 6 weeks ago. Neither of which revealed source of bleed as per pt. Pt has had 5 transfusions in past 2 months.   Of note, pt currently off warfarin and plavix given chronic GIB and transfusion requirement, being evaluated for Watchman device.   Pt was scheduled for transfusion on Saturday, however was halted as pt found to have blood related antibody.    In ED   Tm 98.3, HR 91 - 105, -135/62-69, RR 18, Sat 98%  Pt received 2u pRBC and protonix 40mg IVP once    PCP: Dr. Carrillo Lane  GI: Dr. Valeriano Santos ([facility])  Cardio: Dr. Agrawal 82 y/o male with PMH of HTN, HLD, DM type2, controlled with oral hypoglycemic meds, A1c 6.4, MI (1970), CAD with 2 stent placement in June 2015, pAfib (no AC 2/2 GIB), Lymphedema, Iron deficiency anemia, sent in for outpt labs showing hgb of ~ 5.6 (baseline ~ 8, though transfusion dependent). Pt has ~ 2 year hx of recurrent GIBs, upper endoscopy 6 weeks and c-scope 2 weeks ago did not identify source, pt capsule endoscopy this past week via Dr. Santos which showed upper intestinal bleed. Pt has had 5 transfusions in past 2 months, was scheduled for outpt transfusion on Sat, however at the time, blood related antibodies were identified and blood match was not able to be achieved. Pt was sent at home after counseling, had labs drawn as outpt on AM of admission and was sent to ED.   Pt has been feeling greater fatigue in past 2 weeks with lightheadedness on ambulation and greater PARRISH, notes tarry stools, denies diarrhea, abdominal pain, vomiting, CP. States weight gain of 20lbs in past few months.   Of note, pt currently has been off warfarin and plavix for past year given chronic GIB and transfusion requirement, currently being evaluated for Watchman device.   Pt states was recently started on octreotide and switched from HCTZ to lasix.     In ED   Tm 98.3, HR 91 - 105, -135/62-69, RR 18, Sat 98%  Pt received 2u pRBC and protonix 40mg IVP once    PCP: Dr. Carrillo Lane  GI: Dr. Valeriano Santos ([facility])  Cardio: Dr. Agrawal 83 y/o male with PMH of HTN, HLD, DM type2, controlled with oral hypoglycemic meds, A1c 6.4, MI (1970), CAD with 2 stent placement in June 2015, L carotid stenosis sp CEA (2015), pAfib (no AC 2/2 GIB), Lymphedema, Iron deficiency anemia, sent in for outpt labs showing hgb of ~ 5.6 (baseline ~ 8, though transfusion dependent). Pt has ~ 2 year hx of recurrent GIBs, upper endoscopy 6 weeks and c-scope 2 weeks ago did not identify source, pt capsule endoscopy this past week via Dr. Santos which showed upper intestinal bleed. Pt has had 5 transfusions in past 2 months, was scheduled for outpt transfusion on Sat, however at the time, blood related antibodies were identified and blood match was not able to be achieved. Pt was sent at home after counseling, had labs drawn as outpt on AM of admission and was sent to ED.   Pt has been feeling greater fatigue in past 2 weeks with lightheadedness on ambulation and greater PARRISH, notes tarry stools, denies diarrhea, abdominal pain, vomiting, CP, palpitations. States weight gain of 20lbs in past few months.   Of note, pt currently has been off warfarin and plavix for past year given chronic GIB and transfusion requirement, currently being evaluated for Watchman device.   Pt states was recently started on octreotide and switched from HCTZ to lasix.     In ED   Tm 98.3, HR 91 - 105, -135/62-69, RR 18, Sat 98%  Pt received 2u pRBC and protonix 40mg IVP once    PCP: Dr. Carrillo Laen  GI: Dr. Valeriano Santos ([facility])  Cardio: Dr. Agrawal

## 2017-09-05 NOTE — H&P ADULT - PROBLEM SELECTOR PLAN 1
- deemed upper intestinal bleed given that missed by c-scope and EGD  - chronic issue for patient without treatable etiology being identified for ~1-2 years  - 5 transfusions in past 2 months, now with unspecified blood related antibody identified requiring advanced techniques for acquiring safe blood products for patient  - 2u pRBC pending, will check post transfusion CBC   - will cont w/ IV protonix, pt was apparently started on octroeotide as outpt though unclear if varices present,   - will consult house as Dr. Santos likely without permissions here  - pt HDS at this time, but will need 2 large bore IVs, close HDS monitoring - deemed upper intestinal bleed given that missed by c-scope and EGD  - chronic issue for patient without treatable etiology being identified for ~1-2 years  - 5 transfusions in past 2 months, now with unspecified blood related antibody identified requiring advanced techniques for acquiring safe blood products for patient  - 1u pRBC pending, will check post transfusion CBC   - will cont w/ IV protonix, pt was apparently started on octroeotide as outpt though unclear if varices present,   - will consult house as Dr. Santos likely without permissions here  - pt HDS at this time, but will need 2 large bore IVs, close HDS monitoring  -hold aspirin.

## 2017-09-05 NOTE — H&P ADULT - PROBLEM SELECTOR PLAN 3
- cont w/ cardizem, lipitor  - will hold ARB given ZHAO likely 2/2 prerenal in setting of anemia - plan as above for GIB, pt states has been getting IV iron as oupt, will cont here

## 2017-09-05 NOTE — H&P ADULT - PROBLEM SELECTOR PLAN 7
- likely pre-renal 2/2 GIB   - hold ARB, monitor creatinine - unclear etiology, though appears chronic issue, likely 2/2 chronic GIB - unclear etiology, though appears chronic issue, likely 2/2 chronic GIB v. developing ITP?

## 2017-09-05 NOTE — ED ADULT NURSE NOTE - OBJECTIVE STATEMENT
Pt is an ambulatory 82 yr old male a/oX 3 sent by PMD for low H/H.  Pt has had 7-9 transfusions over last 3 years and was told his H/H was 5/15.  C/o SOB on exertion and pale skin and weakness.  PERRL wnl, giron with equal strength.  AFIB on cm at 98 bpm no chest paiun.  SOb worse on exertion.  no N/V/F.  Abdomen NT ND with BS+4Q.  SKIN WDI, pale for race.  Peripheral pulses +2bl no edema

## 2017-09-05 NOTE — H&P ADULT - NSHPLABSRESULTS_GEN_ALL_CORE
6.2    7.2   )-----------( 129      ( 05 Sep 2017 17:36 )             19.4     09-05    143  |  108  |  59<H>  ----------------------------<  148<H>  4.7   |  19<L>  |  1.72<H>    Ca    8.6      05 Sep 2017 17:36    TPro  5.8<L>  /  Alb  3.3  /  TBili  0.4  /  DBili  x   /  AST  20  /  ALT  16  /  AlkPhos  187<H>  09-05 6.2    7.2   )-----------( 129      ( 05 Sep 2017 17:36 )             19.4     09-05    143  |  108  |  59<H>  ----------------------------<  148<H>  4.7   |  19<L>  |  1.72<H>    Ca    8.6      05 Sep 2017 17:36    TPro  5.8<L>  /  Alb  3.3  /  TBili  0.4  /  DBili  x   /  AST  20  /  ALT  16  /  AlkPhos  187<H>  09-05    Labs reviewed  EKG: afib, RBBB, no pathologic ST segment elevations or depressions appreciated 6.2    7.2   )-----------( 129      ( 05 Sep 2017 17:36 )             19.4     09-05    143  |  108  |  59<H>  ----------------------------<  148<H>  4.7   |  19<L>  |  1.72<H>    Ca    8.6      05 Sep 2017 17:36    TPro  5.8<L>  /  Alb  3.3  /  TBili  0.4  /  DBili  x   /  AST  20  /  ALT  16  /  AlkPhos  187<H>  09-05      I personally reviewed & interpreted the lab findings above; CBC c/w worsening anemia and CMP c/w ZHAO   I personally reviewed & interpreted the radiographic findings; no prior obvious focal consolidation on past cxr. Endoscopy findings and enteroscopy findings reviewed and interpreted; chronic duodenal ulcers; gastritis, and numerous angiectasias.   I personally reviewed & interpreted the EKG findings; RBBB; irregularly irregular

## 2017-09-05 NOTE — ED ADULT NURSE NOTE - PMH
Anemia    Carotid stenosis    Coronary artery disease due to calcified coronary lesion    DM (diabetes mellitus)    Former smoker    HLD (hyperlipidemia)    HTN (hypertension)    MI (myocardial infarction)    PAF (paroxysmal atrial fibrillation)    Stented coronary artery

## 2017-09-05 NOTE — H&P ADULT - NSHPREVIEWOFSYSTEMS_GEN_ALL_CORE
General: Denies fever, chills, weight loss  HEENT: Denies sore throat, dysphagia, vision changes  Cardio: Denies CP, palpitations  Pulm: Denies SOB at rest, wheezing, cough. (+) PARRISH  GI: Denies nausea, vomiting, abdominal pain. (+) Dark stools  Neuro: Denies focal weakness, headaches, decreased/altered sensation  Musc Skel: Denies back pain, joint pain  Skin: Denies recent rashes  Endocrine: Denies intolerance to heat or coldness  Psych: Denies anxiety and depressed mood REVIEW OF SYSTEMS:    CONSTITUTIONAL: No weakness, fevers or chills  ENMT:  No ear pain, No vertigo or throat pain  EYES: No visual changes  or photophobia  NECK: No pain or stiffness  RESPIRATORY: No cough, wheezing, hemoptysis; No shortness of breath;  +PARRISH  CARDIOVASCULAR: No chest pain or palpitations;   GASTROINTESTINAL: No abdominal or epigastric pain. No nausea, vomiting, or hematemesis; No diarrhea or constipation. No melena or hematochezia.  MUSCULOSKELETAL: No joint swelling  or warmth.  GENITOURINARY: No dysuria, frequency or hematuria  NEUROLOGICAL: No numbness or weakness; +dizziness   PSYCHIATRIC: No depression or anhedonia.  ENDOCRINE: No sx hypoglycemic episodes.  SKIN: No itching, burning, rashes, or lesions

## 2017-09-05 NOTE — H&P ADULT - PROBLEM SELECTOR PLAN 4
- cont w/ cardizem. appears stable - cont w/ cardizem, lipitor  - will hold ARB given ZHAO likely 2/2 prerenal in setting of anemia - cont w/ lipitor and coreg   - will hold ARB given ZHAO likely 2/2 prerenal in setting of anemia

## 2017-09-05 NOTE — ED PROVIDER NOTE - OBJECTIVE STATEMENT
82y Male complaining of Low H&H.     hematologist  PCP: Dr. Carrillo Lane  GI: Dr. Valeriano Santos ([facility]) 82y Male complaining of Low H&H. Was checked today, had Hgb of 5.6. Known melena.    hematologist  PCP: Dr. Carrillo Lane  GI: Dr. Valeriano Santos ([facility]) 82y Male complaining of Low H&H. Was checked today, had Hgb of 5.6. Known melena. Had capsule endoscopy with evidence of bleeding in the lower intestines.    hematologist  PCP: Dr. Carrillo Lane  GI: Dr. Valeriano Santos ([facility]) 82y Male complaining of Low H&H. Was checked today, had Hgb of 5.6, sent here. Known melena with chronic GI bleed. Had capsule endoscopy recently with evidence of bleeding in the lower intestines. Patient usually gets blood transfusions weekly, but unable to get recently due to antibody incompatibility. Appearing pale, but no syncope or lightheadedness.    hematologist at [facility]  PCP: Dr. Carrillo Lane  GI: Dr. Valeriano Santos ([facility])

## 2017-09-05 NOTE — H&P ADULT - PROBLEM SELECTOR PROBLEM 3
Coronary artery disease due to calcified coronary lesion Iron deficiency anemia due to chronic blood loss

## 2017-09-05 NOTE — H&P ADULT - PROBLEM SELECTOR PLAN 2
- plan as above for GIB, pt states has been getting IV iron as oupt, will cont here - likely pre-renal 2/2 GIB   - hold ARB, monitor creatinine - likely pre-renal 2/2 GIB   - hold ARB and diuretics, monitor creatinine

## 2017-09-05 NOTE — H&P ADULT - ATTENDING COMMENTS
Patient seen and examined at bedside  Agree w./ the resident note above w/ the following addendum.  Patient now presenting w/ likely occult upper GI bleeding likely 2/2 to angioectasia v. duodenal ulcer v. gastritis who is hemodynamically stable and stable-ill appearing. Prior records reveal H. pylori negative.  Will give 1-unit PRBC and recheck CBC given recent concerns of serum auto-antibodies and "difficult transfusion". Will aim for hemoglobin of 7.0 and above given no active chest pain. If he develops chest pain will need telemetry monitoring w./ goal Hgb above 8.0. GI consultation was done and there is concerns patient may require transfer to [facility]. However, house GI will evaluate patient in AM. Patient's GI Dr. Isaac also contacted and made aware of patient's hospitlization. Will keep NPO, PPI IV BID 40mg, start octreotide for several angioectasias, and monitor H&H carefully. Goals and care discussion regarding advance care planning performed by me for over 20-minutes and patient is FULL CODE and would like to discuss these measures on his own with his spouse. Primary team to followup GI recs and advance care planning measures. Would recommend cards eval in AM for goals regarding further treatment w/ aspirin given risk v. benefit in setting of ACTIVE GI bleed and chronic GI bleed w/  repetitive hospitalizations. Patient seen and examined at bedside  Agree w./ the resident note above w/ the following addendum.  Patient now presenting w/ likely occult upper GI bleeding likely 2/2 to angioectasia v. duodenal ulcer v. gastritis who is hemodynamically stable and stable-ill appearing. Prior records reveal H. pylori negative.  Will give 1-unit PRBC and recheck CBC given recent concerns of serum auto-antibodies and "difficult transfusion". Will aim for hemoglobin of 7.0 and above given no active chest pain. If he develops chest pain will need telemetry monitoring w./ goal Hgb above 8.0. GI consultation was done overnight. However, house GI will evaluate patient in AM. Patient's GI Dr. Isaac also contacted and made aware of patient's hospitlization. Will keep NPO, PPI IV BID 40mg, start octreotide for several angioectasias, and monitor H&H carefully. Goals and care discussion regarding advance care planning performed by me for over 20-minutes and patient is FULL CODE and would like to discuss these measures on his own with his spouse. Primary team to followup GI recs and advance care planning measures. Would recommend cards eval in AM for goals regarding further treatment w/ aspirin given risk v. benefit in setting of ACTIVE GI bleed and chronic GI bleed w/  repetitive hospitalizations. Patient seen and examined at bedside  Agree w./ the resident note above w/ the following addendum.  Patient now presenting w/ likely occult upper GI bleeding likely 2/2 to angioectasia v. duodenal ulcer v. gastritis who is hemodynamically stable and stable-ill appearing. Prior records reveal H. pylori negative.  Will give 1-unit PRBC and recheck CBC given recent concerns of serum auto-antibodies and "difficult transfusion". Will aim for hemoglobin of 7.0 and above given no active chest pain. If he develops chest pain will need telemetry monitoring w./ goal Hgb above 8.0. GI consultation was done overnight. However, house GI will evaluate patient in AM. Patient's GI Dr. Isaac also contacted and made aware of patient's hospitalization. Will keep NPO, PPI IV BID 40mg, start octreotide for several angioectasias based on prior endoscopy reports, and monitor H&H carefully. Goals and care discussion regarding advance care planning performed by me for over 20-minutes and patient is FULL CODE and would like to discuss these measures on his own with his spouse. Primary team to followup GI recs and advance care planning measures. Would recommend cards eval in AM for goals regarding further treatment w/ aspirin given risk v. benefit in setting of ACTIVE GI bleed and chronic GI bleed w/  repetitive hospitalizations.

## 2017-09-05 NOTE — H&P ADULT - NSHPPHYSICALEXAM_GEN_ALL_CORE
Vital Signs Last 24 Hrs  T(C): 36.8 (05 Sep 2017 19:31), Max: 36.8 (05 Sep 2017 19:31)  T(F): 98.3 (05 Sep 2017 19:31), Max: 98.3 (05 Sep 2017 19:31)  HR: 91 (05 Sep 2017 19:31) (91 - 105)  BP: 135/69 (05 Sep 2017 19:31) (100/62 - 135/69)  BP(mean): --  RR: 18 (05 Sep 2017 19:31) (18 - 18)  SpO2: 96% (05 Sep 2017 19:31) (96% - 98%) Vital Signs Last 24 Hrs  T(C): 36.8 (05 Sep 2017 19:31), Max: 36.8 (05 Sep 2017 19:31)  T(F): 98.3 (05 Sep 2017 19:31), Max: 98.3 (05 Sep 2017 19:31)  HR: 91 (05 Sep 2017 19:31) (91 - 105)  BP: 135/69 (05 Sep 2017 19:31) (100/62 - 135/69)  BP(mean): --  RR: 18 (05 Sep 2017 19:31) (18 - 18)  SpO2: 96% (05 Sep 2017 19:31) (96% - 98%)    PHYSICAL EXAM:  GENERAL: NAD, well-developed  EYES: EOMI, PERRLA, conjunctiva and sclera clear. (+) pale conjunctiva BL  NECK: Supple, No JVD. (+) mild to mod skin tenting over scapula  CHEST/LUNG: Clear to auscultation bilaterally; No wheezes  HEART: irregular irregu rate and rhythm; No murmurs, rubs, or gallops  ABDOMEN: Soft, Nontender, mild distension; Bowel sounds hypoactive  EXTREMITIES: Chronic BL LE hyperpigmentation and lymphedematous skin changes, sensation intact  PSYCH: AAOx3  NEUROLOGY: non-focal  SKIN: Right Upper Extremity contusion Vital Signs Last 24 Hrs  T(C): 36.8 (05 Sep 2017 19:31), Max: 36.8 (05 Sep 2017 19:31)  T(F): 98.3 (05 Sep 2017 19:31), Max: 98.3 (05 Sep 2017 19:31)  HR: 91 (05 Sep 2017 19:31) (91 - 105)  BP: 135/69 (05 Sep 2017 19:31) (100/62 - 135/69)  BP(mean): --  RR: 18 (05 Sep 2017 19:31) (18 - 18)  SpO2: 96% (05 Sep 2017 19:31) (96% - 98%)    PHYSICAL EXAM:  GENERAL: NAD, well-developed  EYES: EOMI, PERRLA, conjunctiva and sclera clear. (+) pale conjunctiva BL  NECK: Supple, No JVD. (+) mild to mod skin tenting over scapula  CHEST/LUNG: Clear to auscultation bilaterally; No wheezes  HEART: irregular irreg rate and rhythm; No murmurs, rubs, or gallops  ABDOMEN: Soft, Nontender, mild distension; Bowel sounds hypoactive  : performed w/ RN at bedside. Brown stools on rectal exam.   EXTREMITIES: Chronic BL LE hyperpigmentation and lymphedematous skin changes, sensation intact  PSYCH: normal affect; no depression   NEUROLOGY: non-focal; AAOx3   SKIN: Right Upper Extremity ecchymosis.

## 2017-09-05 NOTE — ED CLERICAL - NS ED CLERK NOTE PRE-ARRIVAL INFORMATION; ADDITIONAL PRE-ARRIVAL INFORMATION
19 GI bleed tranfused regularly skipped saturday afib, chf, kidney diease //steffanie andrade is PCP and shelly orr is cardi

## 2017-09-05 NOTE — H&P ADULT - PROBLEM SELECTOR PLAN 6
- unclear etiology, though appears chronic issue, likely 2/2 chronic GIB - hold home oral med  - will start ISS, plan to reassess

## 2017-09-05 NOTE — H&P ADULT - PROBLEM SELECTOR PROBLEM 6
Thrombocytopenia Type 2 diabetes mellitus with stage 3 chronic kidney disease, without long-term current use of insulin

## 2017-09-05 NOTE — H&P ADULT - ASSESSMENT
82 y/o male with PMH of HTN, HLD, DM type2, controlled with oral hypoglycemic meds, A1c 6.4, MI ( 1970), CAD with stent placement in June 2015, Paroxysmal Afib, (Coumadin???), Lymphedema, Iron deficiency anemia, p/w lower intestine GIB found on capsule endoscopy 82 y/o male with PMH of HTN, HLD, DM type2, controlled with oral hypoglycemic meds, A1c 6.4, MI (1970), CAD with 2 stent placement in June 2015, pAfib (no AC 2/2 GIB), Lymphedema, Iron deficiency anemia, sent in for outpt labs showing hgb of ~ 5.6 (baseline ~ 8, though transfusion dependent) in setting of capsule endoscopy showing upper intestinal bleed missed by recent c-scope and EGD.

## 2017-09-05 NOTE — H&P ADULT - PROBLEM SELECTOR PLAN 9
-Spent more than 20-minutes discussing goals of care and critical care measures including CPR, intubation and pressor support. Patient currently has full decision making capacity and understands the gravity of the situation. He verbalizes his advance medical illness and would like to speak to his wife regarding CPR, intubation, and pressors. Day team to resume goals of care discussion. Emotional support provided.

## 2017-09-05 NOTE — ED ADULT NURSE REASSESSMENT NOTE - NS ED NURSE REASSESS COMMENT FT1
Patient scheduled for 2 units PRBCs. Type and Screen dictated in resulkts. Consent in chart. Trung RN go to blood bank to  first unit of PRBCs. Blood bank tech states blood not able to be dispensed at this time due to antibodies in patient's blood. Tech states they must do their own blood bank blood typing in order to dispense the unit of blood. Tech instructed RN to return to blood bank in an hour to check status.

## 2017-09-05 NOTE — H&P ADULT - NSHPSOCIALHISTORY_GEN_ALL_CORE
Pt reports remote hx of tobacco product use but stopped over 20 years ago and smoked for 30 years 1ppd. No current use of alcohol or hx of alcohol abuse. No prior or current use of illicit drugs.

## 2017-09-06 DIAGNOSIS — I45.10 UNSPECIFIED RIGHT BUNDLE-BRANCH BLOCK: ICD-10-CM

## 2017-09-06 DIAGNOSIS — I25.10 ATHEROSCLEROTIC HEART DISEASE OF NATIVE CORONARY ARTERY WITHOUT ANGINA PECTORIS: ICD-10-CM

## 2017-09-06 DIAGNOSIS — I48.2 CHRONIC ATRIAL FIBRILLATION: ICD-10-CM

## 2017-09-06 LAB
ANION GAP SERPL CALC-SCNC: 11 MMOL/L — SIGNIFICANT CHANGE UP (ref 5–17)
BUN SERPL-MCNC: 52 MG/DL — HIGH (ref 7–23)
CALCIUM SERPL-MCNC: 8.4 MG/DL — SIGNIFICANT CHANGE UP (ref 8.4–10.5)
CHLORIDE SERPL-SCNC: 107 MMOL/L — SIGNIFICANT CHANGE UP (ref 96–108)
CO2 SERPL-SCNC: 23 MMOL/L — SIGNIFICANT CHANGE UP (ref 22–31)
CREAT SERPL-MCNC: 1.55 MG/DL — HIGH (ref 0.5–1.3)
GLUCOSE SERPL-MCNC: 104 MG/DL — HIGH (ref 70–99)
HCT VFR BLD CALC: 21 % — CRITICAL LOW (ref 39–50)
HCT VFR BLD CALC: 23 % — LOW (ref 39–50)
HCT VFR BLD CALC: 26.1 % — LOW (ref 39–50)
HGB BLD-MCNC: 6.3 G/DL — CRITICAL LOW (ref 13–17)
HGB BLD-MCNC: 7.5 G/DL — LOW (ref 13–17)
HGB BLD-MCNC: 8.4 G/DL — LOW (ref 13–17)
MAGNESIUM SERPL-MCNC: 2.2 MG/DL — SIGNIFICANT CHANGE UP (ref 1.6–2.6)
MCHC RBC-ENTMCNC: 27.6 PG — SIGNIFICANT CHANGE UP (ref 27–34)
MCHC RBC-ENTMCNC: 30 GM/DL — LOW (ref 32–36)
MCHC RBC-ENTMCNC: 30 PG — SIGNIFICANT CHANGE UP (ref 27–34)
MCHC RBC-ENTMCNC: 30.7 PG — SIGNIFICANT CHANGE UP (ref 27–34)
MCHC RBC-ENTMCNC: 32.1 GM/DL — SIGNIFICANT CHANGE UP (ref 32–36)
MCHC RBC-ENTMCNC: 32.6 GM/DL — SIGNIFICANT CHANGE UP (ref 32–36)
MCV RBC AUTO: 92.1 FL — SIGNIFICANT CHANGE UP (ref 80–100)
MCV RBC AUTO: 93.2 FL — SIGNIFICANT CHANGE UP (ref 80–100)
MCV RBC AUTO: 94.2 FL — SIGNIFICANT CHANGE UP (ref 80–100)
PHOSPHATE SERPL-MCNC: 3.5 MG/DL — SIGNIFICANT CHANGE UP (ref 2.5–4.5)
PLATELET # BLD AUTO: 122 K/UL — LOW (ref 150–400)
PLATELET # BLD AUTO: 136 K/UL — LOW (ref 150–400)
PLATELET # BLD AUTO: 169 K/UL — SIGNIFICANT CHANGE UP (ref 150–400)
POTASSIUM SERPL-MCNC: 4.5 MMOL/L — SIGNIFICANT CHANGE UP (ref 3.5–5.3)
POTASSIUM SERPL-SCNC: 4.5 MMOL/L — SIGNIFICANT CHANGE UP (ref 3.5–5.3)
RBC # BLD: 2.28 M/UL — LOW (ref 4.2–5.8)
RBC # BLD: 2.44 M/UL — LOW (ref 4.2–5.8)
RBC # BLD: 2.8 M/UL — LOW (ref 4.2–5.8)
RBC # FLD: 15.5 % — HIGH (ref 10.3–14.5)
RBC # FLD: 16 % — HIGH (ref 10.3–14.5)
RBC # FLD: 17.7 % — HIGH (ref 10.3–14.5)
SODIUM SERPL-SCNC: 141 MMOL/L — SIGNIFICANT CHANGE UP (ref 135–145)
WBC # BLD: 5.1 K/UL — SIGNIFICANT CHANGE UP (ref 3.8–10.5)
WBC # BLD: 5.3 K/UL — SIGNIFICANT CHANGE UP (ref 3.8–10.5)
WBC # BLD: 8.3 K/UL — SIGNIFICANT CHANGE UP (ref 3.8–10.5)
WBC # FLD AUTO: 5.1 K/UL — SIGNIFICANT CHANGE UP (ref 3.8–10.5)
WBC # FLD AUTO: 5.3 K/UL — SIGNIFICANT CHANGE UP (ref 3.8–10.5)
WBC # FLD AUTO: 8.3 K/UL — SIGNIFICANT CHANGE UP (ref 3.8–10.5)

## 2017-09-06 PROCEDURE — 99233 SBSQ HOSP IP/OBS HIGH 50: CPT | Mod: GC

## 2017-09-06 PROCEDURE — 99223 1ST HOSP IP/OBS HIGH 75: CPT

## 2017-09-06 RX ORDER — INSULIN LISPRO 100/ML
VIAL (ML) SUBCUTANEOUS AT BEDTIME
Qty: 0 | Refills: 0 | Status: DISCONTINUED | OUTPATIENT
Start: 2017-09-06 | End: 2017-09-07

## 2017-09-06 RX ORDER — PANTOPRAZOLE SODIUM 20 MG/1
40 TABLET, DELAYED RELEASE ORAL
Qty: 0 | Refills: 0 | Status: DISCONTINUED | OUTPATIENT
Start: 2017-09-06 | End: 2017-09-07

## 2017-09-06 RX ORDER — INSULIN LISPRO 100/ML
VIAL (ML) SUBCUTANEOUS
Qty: 0 | Refills: 0 | Status: DISCONTINUED | OUTPATIENT
Start: 2017-09-06 | End: 2017-09-07

## 2017-09-06 RX ADMIN — Medication 500 MILLIGRAM(S): at 11:13

## 2017-09-06 RX ADMIN — Medication 2: at 17:10

## 2017-09-06 RX ADMIN — CARVEDILOL PHOSPHATE 6.25 MILLIGRAM(S): 80 CAPSULE, EXTENDED RELEASE ORAL at 17:10

## 2017-09-06 RX ADMIN — ATORVASTATIN CALCIUM 20 MILLIGRAM(S): 80 TABLET, FILM COATED ORAL at 22:18

## 2017-09-06 RX ADMIN — OCTREOTIDE ACETATE 50 MICROGRAM(S): 200 INJECTION, SOLUTION INTRAVENOUS; SUBCUTANEOUS at 22:18

## 2017-09-06 RX ADMIN — OCTREOTIDE ACETATE 50 MICROGRAM(S): 200 INJECTION, SOLUTION INTRAVENOUS; SUBCUTANEOUS at 05:49

## 2017-09-06 RX ADMIN — Medication 1 TABLET(S): at 11:13

## 2017-09-06 RX ADMIN — Medication 1: at 12:18

## 2017-09-06 RX ADMIN — CARVEDILOL PHOSPHATE 6.25 MILLIGRAM(S): 80 CAPSULE, EXTENDED RELEASE ORAL at 05:49

## 2017-09-06 RX ADMIN — PANTOPRAZOLE SODIUM 40 MILLIGRAM(S): 20 TABLET, DELAYED RELEASE ORAL at 05:49

## 2017-09-06 RX ADMIN — OCTREOTIDE ACETATE 50 MICROGRAM(S): 200 INJECTION, SOLUTION INTRAVENOUS; SUBCUTANEOUS at 13:30

## 2017-09-06 NOTE — CONSULT NOTE ADULT - ASSESSMENT
82 year old male with chronic GIB s/p extensive endoscopic workup. Pt receives periodic PRBCs as an outpatient. 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 Pt was started on octreotide for further management. He was found to be profoundly anemic with hgb 5.6. He was admitted for transfusion given development of antibodies.  -Plan of care discussed between Dr. Choe and Dr. Santos. C/w octreotide 50mcg sc q8.  -transfuse prbc, monitor H&H closely  -no planned endoscopy. Please resume regular diet  -PPI daily.
Chronic anemia from Gi bleeding ?intestinal  Transfused 2 u pRBC awaiting third unit  positive antibody screen, difficult to find blood  which makes GI resolution even more important  keep hgb greater than 7.5  started sandostatin for intestinal bleeding  management per GI
82 year old man with known coronary disease, coronary stents in LAD, atrial fibrillation necessarily off anticoagulation for past year. Currently off all antiplatelet therapy as well, but will need to resume aspirin very soon as risk of stent thrombosis with a permanent concern. Despite profound anemia is remarkably without manifestations of myocardial ischemia.

## 2017-09-06 NOTE — CONSULT NOTE ADULT - CONSULT REASON
coronary artery disease, atrial fibrillation
GIB, anemia
anemia with multiple antibodies in need of transfusion

## 2017-09-06 NOTE — PROGRESS NOTE ADULT - SUBJECTIVE AND OBJECTIVE BOX
Yasmani Gil MD  Cell: [phone]  Pager: [phone]    S:  Pt reports on and off lightheadedness occasionally.  Otherwise asymptomatic.    VITAL SIGNS:  Vital Signs Last 24 Hrs  T(C): 36.4 (06 Sep 2017 05:39), Max: 36.8 (05 Sep 2017 19:31)  T(F): 97.5 (06 Sep 2017 05:39), Max: 98.3 (05 Sep 2017 19:31)  HR: 83 (06 Sep 2017 05:39) (82 - 105)  BP: 124/77 (06 Sep 2017 05:39) (100/62 - 146/84)  BP(mean): --  RR: 18 (06 Sep 2017 05:39) (18 - 18)  SpO2: 95% (06 Sep 2017 05:39) (95% - 98%)      PHYSICAL EXAM:  GENERAL: NAD, well-developed  EYES: EOMI, (+) pale conjunctiva BL  NECK: Supple, No JVD  CHEST/LUNG: Clear to auscultation bilaterally; No wheezes  HEART: irregular irreg rate and rhythm; No murmurs, rubs, or gallops  ABDOMEN: Soft, Nontender, mild distension  EXTREMITIES: Chronic BL LE hyperpigmentation and lymphedematous skin changes, ban dage on R ankle, sensation intact  PSYCH: normal affect; no depression   NEUROLOGY: non-focal; AAOx3   SKIN: Right Upper Extremity ecchymosis.                          6.3    5.30  )-----------( 136      ( 06 Sep 2017 08:49 )             21.0     09-06    141  |  107  |  52<H>  ----------------------------<  104<H>  4.5   |  23  |  1.55<H>    Ca    8.4      06 Sep 2017 08:45  Phos  3.5     09-06  Mg     2.2     09-06    TPro  5.8<L>  /  Alb  3.3  /  TBili  0.4  /  DBili  x   /  AST  20  /  ALT  16  /  AlkPhos  187<H>  09-05      CAPILLARY BLOOD GLUCOSE  153 (06 Sep 2017 12:13)  102 (06 Sep 2017 08:09)  98 (05 Sep 2017 22:43)      MEDICATIONS  (STANDING):  atorvastatin 20 milliGRAM(s) Oral at bedtime  ascorbic acid 500 milliGRAM(s) Oral daily  multivitamin/minerals 1 Tablet(s) Oral daily  carvedilol 6.25 milliGRAM(s) Oral every 12 hours  insulin lispro (HumaLOG) corrective regimen sliding scale   SubCutaneous every 6 hours  dextrose 50% Injectable 25 Gram(s) IV Push once  octreotide  Injectable 50 MICROGram(s) SubCutaneous every 8 hours  pantoprazole    Tablet 40 milliGRAM(s) Oral before breakfast

## 2017-09-06 NOTE — CONSULT NOTE ADULT - PROBLEM SELECTOR RECOMMENDATION 9
Remain off clopidogrel.  Off aspirin until acute bleeding with transfusion requirement controlled.  Long period off aspirin poses a substantial risk, but for now re-evaluate daily.

## 2017-09-06 NOTE — PROGRESS NOTE ADULT - PROBLEM SELECTOR PLAN 1
- pt and nurse report dark stools overnight  - deemed upper intestinal bleed given that missed by prior c-scope and EGD  - chronic issue for patient without treatable etiology being identified for ~1-2 years  - 5 transfusions in past 2 months, now with unspecified blood related antibody identified requiring advanced techniques for acquiring safe blood products for patient  - got 1u pRBC, pending 2nd  - will cont w/ IV protonix, pt was apparently started on octroeotide as outpt though unclear if varices present  - followed by Dr. Santos - to be seen by Dr. Choe (will discuss whether he is an associate with Dr. Santos or how they are related)  - hold aspirin. - pt and nurse report dark stools overnight  - deemed upper intestinal bleed given that missed by prior c-scope and EGD  - chronic issue for patient without treatable etiology being identified for ~1-2 years  - 5 transfusions in past 2 months, now with unspecified blood related antibody identified requiring advanced techniques for acquiring safe blood products for patient  - got 1u pRBC, pending 2nd  - will cont w/ IV protonix, pt was apparently started on octroeotide as outpt though unclear if varices present  - followed by Dr. Santos and seen by his associate, Dr. Choe - recs are C/w octreotide 50mcg sc q8, regular diet, PPI daily, no need for another scope  - hold aspirin.

## 2017-09-06 NOTE — PROGRESS NOTE ADULT - PROBLEM SELECTOR PLAN 9
-Spent more than 20-minutes discussing goals of care and critical care measures including CPR, intubation and pressor support. Patient currently has full decision making capacity and understands the gravity of the situation. He verbalizes his advance medical illness and would like to speak to his wife regarding CPR, intubation, and pressors. Day team to resume goals of care discussion. Emotional support provided. - Spent more than 20-minutes discussing goals of care and critical care measures including CPR, intubation and pressor support. Patient currently has full decision making capacity and understands the gravity of the situation. He verbalizes his advance medical illness and would like to speak to his wife regarding CPR, intubation, and pressors. Day team to resume goals of care discussion. Emotional support provided.

## 2017-09-06 NOTE — CONSULT NOTE ADULT - PROBLEM SELECTOR RECOMMENDATION 2
stable and rate controlled.  some risk off anticoagulation, but risk of anticoagulation for now remains greater.  Has been evaluated for Watchman device by Dr. Magdiel Trevino at [facility].

## 2017-09-06 NOTE — CONSULT NOTE ADULT - SUBJECTIVE AND OBJECTIVE BOX
HPI:  82 year old man with HTN, DM type2 controlled with oral hypoglycemic meds, A1c 6.4, MI 1970, CAD with 2 stent placement in 2015, L carotid stenosis, CEA , paroxysmal and now permanent Afib, AC stopped due to refractory GIB, Sent to hospital by hematologist for very profound anemia, hgb 5.6 (baseline ~ 8), transfusion dependent. Blood related antibodies identified, blood match not able to be achieved thus sent for admission. Capsule endoscopy this past week with Dr. Santos indicated upper intestinal bleed. Has been feeling greater fatigue in past 2 weeks with lightheadedness on ambulation and greater PARRISH, but no chest pain and no dyspnea at rest. Notes tarry stools, denies diarrhea, abdominal pain, vomiting, CP, palpitations. States weight gain of 20lbs in past few months.     Has been off warfarin and clopidogrel for past year due to GIB and transfusion requirement. Being evaluated for Watchman device.   Pt states was recently started on octreotide and switched from HCTZ to furosemide.     In ED received 2u pRBC.    PCP: Dr. Carrillo Lane  GI: Dr. Valeriano Santos ([facility])  Cardio: Dr. Agrawal (05 Sep 2017 19:37)    PMH:   Coronary artery disease due to calcified coronary lesion  Stented coronary artery  Anemia  Carotid stenosis  DM (diabetes mellitus)  PAF (paroxysmal atrial fibrillation)  HLD (hyperlipidemia)  HTN (hypertension)  MI (myocardial infarction)  Former smoker    PSH:   History of tonsillectomy  H/O carotid endarterectomy    Medications:   atorvastatin 20 milliGRAM(s) Oral at bedtime  ascorbic acid 500 milliGRAM(s) Oral daily  multivitamin/minerals 1 Tablet(s) Oral daily  carvedilol 6.25 milliGRAM(s) Oral every 12 hours  insulin lispro (HumaLOG) corrective regimen sliding scale   SubCutaneous every 6 hours  dextrose Gel 1 Dose(s) Oral once PRN  dextrose 50% Injectable 25 Gram(s) IV Push once  glucagon  Injectable 1 milliGRAM(s) IntraMuscular once PRN  octreotide  Injectable 50 MICROGram(s) SubCutaneous every 8 hours  pantoprazole    Tablet 40 milliGRAM(s) Oral before breakfast    Allergies:  Levaquin (Other)  penicillin (Other)    FAMILY HISTORY:  Family history of heart disease: father  of MI age 59    Social History:  Smoking: non-smoker.  Alcohol: denies    Cardiovascular Diagnostic Testing:  ECG: atrial fibrillation, right bundle branch block, unchanged.    Echo:  Patient seen and evaluated @   Chief Complaint:     HPI:  81 y/o male with PMH of HTN, HLD, DM type2, controlled with oral hypoglycemic meds, A1c 6.4, MI (), CAD with 2 stent placement in 2015, L carotid stenosis sp CEA (), pAfib (no AC 2/2 GIB), Lymphedema, Iron deficiency anemia, sent in for outpt labs showing hgb of ~ 5.6 (baseline ~ 8, though transfusion dependent). Pt has ~ 2 year hx of recurrent GIBs, upper endoscopy 6 weeks and c-scope 2 weeks ago did not identify source, pt capsule endoscopy this past week via Dr. Santos which showed upper intestinal bleed. Pt has had 5 transfusions in past 2 months, was scheduled for outpt transfusion on Sat, however at the time, blood related antibodies were identified and blood match was not able to be achieved. Pt was sent at home after counseling, had labs drawn as outpt on AM of admission and was sent to ED.   Pt has been feeling greater fatigue in past 2 weeks with lightheadedness on ambulation and greater PARRISH, notes tarry stools, denies diarrhea, abdominal pain, vomiting, CP, palpitations. States weight gain of 20lbs in past few months.   Of note, pt currently has been off warfarin and plavix for past year given chronic GIB and transfusion requirement, currently being evaluated for Watchman device.   Pt states was recently started on octreotide and switched from HCTZ to lasix.     In ED   Tm 98.3, HR 91 - 105, -135/62-69, RR 18, Sat 98%  Pt received 2u pRBC and protonix 40mg IVP once    PCP: Dr. Carrillo Lane  GI: Dr. Valeriano Santos ([facility])  Cardio: Dr. Agrawal (05 Sep 2017 19:37)    PMH:   Coronary artery disease due to calcified coronary lesion  Stented coronary artery  Anemia  Carotid stenosis  DM (diabetes mellitus)  PAF (paroxysmal atrial fibrillation)  HLD (hyperlipidemia)  HTN (hypertension)  MI (myocardial infarction)  Former smoker    PSH:   History of tonsillectomy  H/O carotid endarterectomy    Medications:   atorvastatin 20 milliGRAM(s) Oral at bedtime  ascorbic acid 500 milliGRAM(s) Oral daily  multivitamin/minerals 1 Tablet(s) Oral daily  carvedilol 6.25 milliGRAM(s) Oral every 12 hours  insulin lispro (HumaLOG) corrective regimen sliding scale   SubCutaneous every 6 hours  dextrose Gel 1 Dose(s) Oral once PRN  dextrose 50% Injectable 25 Gram(s) IV Push once  glucagon  Injectable 1 milliGRAM(s) IntraMuscular once PRN  octreotide  Injectable 50 MICROGram(s) SubCutaneous every 8 hours  pantoprazole    Tablet 40 milliGRAM(s) Oral before breakfast    Allergies:  Levaquin (Other)  penicillin (Other)    FAMILY HISTORY:  Family history of heart disease: father  of MI age 59    Social History:  Smoking:  Alcohol:  Drugs:      Cardiovascular Diagnostic Testing:  ECG:    Cath: Patient seen and evaluated @   Chief Complaint:     HPI:  81 y/o male with PMH of HTN, HLD, DM type2, controlled with oral hypoglycemic meds, A1c 6.4, MI (), CAD with 2 stent placement in 2015, L carotid stenosis sp CEA (), pAfib (no AC 2/2 GIB), Lymphedema, Iron deficiency anemia, sent in for outpt labs showing hgb of ~ 5.6 (baseline ~ 8, though transfusion dependent). Pt has ~ 2 year hx of recurrent GIBs, upper endoscopy 6 weeks and c-scope 2 weeks ago did not identify source, pt capsule endoscopy this past week via Dr. Santos which showed upper intestinal bleed. Pt has had 5 transfusions in past 2 months, was scheduled for outpt transfusion on Sat, however at the time, blood related antibodies were identified and blood match was not able to be achieved. Pt was sent at home after counseling, had labs drawn as outpt on AM of admission and was sent to ED.   Pt has been feeling greater fatigue in past 2 weeks with lightheadedness on ambulation and greater PARRISH, notes tarry stools, denies diarrhea, abdominal pain, vomiting, CP, palpitations. States weight gain of 20lbs in past few months.   Of note, pt currently has been off warfarin and plavix for past year given chronic GIB and transfusion requirement, currently being evaluated for Watchman device.   Pt states was recently started on octreotide and switched from HCTZ to lasix.     In ED   Tm 98.3, HR 91 - 105, -135/62-69, RR 18, Sat 98%  Pt received 2u pRBC and protonix 40mg IVP once    PCP: Dr. Carrillo Lane  GI: Dr. Valeriano Santos ([facility])  Cardio: Dr. Agrawal (05 Sep 2017 19:37)    PMH:   Coronary artery disease due to calcified coronary lesion  Stented coronary artery  Anemia  Carotid stenosis  DM (diabetes mellitus)  PAF (paroxysmal atrial fibrillation)  HLD (hyperlipidemia)  HTN (hypertension)  MI (myocardial infarction)  Former smoker    PSH:   History of tonsillectomy  H/O carotid endarterectomy    Medications:   atorvastatin 20 milliGRAM(s) Oral at bedtime  ascorbic acid 500 milliGRAM(s) Oral daily  multivitamin/minerals 1 Tablet(s) Oral daily  carvedilol 6.25 milliGRAM(s) Oral every 12 hours  insulin lispro (HumaLOG) corrective regimen sliding scale   SubCutaneous every 6 hours  dextrose Gel 1 Dose(s) Oral once PRN  dextrose 50% Injectable 25 Gram(s) IV Push once  glucagon  Injectable 1 milliGRAM(s) IntraMuscular once PRN  octreotide  Injectable 50 MICROGram(s) SubCutaneous every 8 hours  pantoprazole    Tablet 40 milliGRAM(s) Oral before breakfast    Allergies:  Levaquin (Other)  penicillin (Other)    FAMILY HISTORY:  Family history of heart disease: father  of MI age 59    Social History:  Smoking:  Alcohol:  Drugs:      Cardiovascular Diagnostic Testing:  ECG:    Echo:    Stress Testing:    Cath:  < from: Cardiac Cath Lab - Adult (06.16.15 @ 12:25) >  CORONARY VESSELS: The coronary circulation is right dominant.  LM:   --  LM: Normal.  LAD:   --  LAD: The vessel was large sized and moderately calcified. There  was one major diagonal branch.  --  Proximal LAD: There was a tubular 80 % stenosis at the origin of S1.  This lesion is a likely culprit for the patient's anginal symptoms.  --  Mid LAD: Angiography showed mild atherosclerosis with no flow limiting  lesions.  --  D1: There was a 95 % stenosis. This lesion is a likely culprit for the  patient's anginal symptoms.  CX:   --  Circumflex: The vessel was medium sized. Angiography showed  moderate atherosclerosis, but no obstructive lesions. There were two major  obtuse marginals.  RCA:   --  Proximal RCA: There was a discrete 100 % stenosis. There was  good collateral blood supply to the distal myocardium. Collatals are  jeopardized with severe LAD disease.  COMPLICATIONS: There were no complications.  INTERVENTIONAL RECOMMENDATIONS: Interventional cardiology review and  decision to place stents in culprit LAD and diagonal lesions. ASA and    < end of copied text >    Interpretation of Telemetry:    Imaging:      Labs:                        6.3    5.30  )-----------( 136      ( 06 Sep 2017 08:49 )             21.0         141  |  107  |  52<H>  ----------------------------<  104<H>  4.5   |  23  |  1.55<H>    Ca    8.4      06 Sep 2017 08:45  Phos  3.5       Mg     2.2         TPro  5.8<L>  /  Alb  3.3  /  TBili  0.4  /  DBili  x   /  AST  20  /  ALT  16  /  AlkPhos  187<H>      PT/INR - ( 05 Sep 2017 17:36 )   PT: 13.6 sec;   INR: 1.25 ratio      PTT - ( 05 Sep 2017 17:36 )  PTT:32.9 sec
Anemia  HPI  year old man with chronic GI bleeding multiple transfusions developed antibodies.  Here with worsening anemia when blood could not be obtained on weekend.  NOw started on sandostatin for intestinal bleeding.  PMH SH FH unchanged com ros fatigue tarry stools  physical  appears well  97.5 83 124/77 18 95 pct sat  Lungs clear  Cor s1s2  ABd soft nontender  Ext no edema  psych normal    data  WBC 5300 hgb 6.3 hct 21 plt [id number] inr 1.25 ptt 32.9 Cr 1.55
CC: anemia, GIB     HPI:83 y/o male well known to Dr. Choe, with PMHx of HTN, HLD, DM type2, MI (1970), CAD s/p PCI x 2 6/15, L carotid stenosis sp CEA (2015), pAfib (no AC 2/2 GIB), Lymphedema, Iron deficiency anemia, who has had recurrent GI bleeding for the past 2 years. He has had multiple endoscopies, colonoscopies, push, single and double balloon enteroscopies as well as video capsule endoscopies which have revealed at times bleeding at times nonbleeding small bowel AVMs. Pt undergoes routine blood transfusion as an outpatient for ongoing anemia. He has had persistent melena. He was scheduled for an outpatient blood transfusion put was told he had developed antibodies. He was sent for inpt transfusion when he was noted to have a hgb 5.6. Pt underwent VCE last week by Dr. Santos who noted 4 areas of bleeding jejunal AVMs. Pt was started on octreotide for further management. He admits to lightheadedness, fatigue, and PARRISH. Denies abdominal pain, nausea, emesis, fever, chills, hematemesis, BRBPR.    PAST MEDICAL & SURGICAL HISTORY:  Coronary artery disease due to calcified coronary lesion  Stented coronary artery  Anemia  Carotid stenosis  DM (diabetes mellitus)  PAF (paroxysmal atrial fibrillation)  HLD (hyperlipidemia)  HTN (hypertension)  MI (myocardial infarction)  Former smoker  History of tonsillectomy  H/O carotid endarterectomy    Allergies:Levaquin (Other)  penicillin (Other)    MEDICATIONS  (STANDING):  atorvastatin 20 milliGRAM(s) Oral at bedtime  ascorbic acid 500 milliGRAM(s) Oral daily  multivitamin/minerals 1 Tablet(s) Oral daily  carvedilol 6.25 milliGRAM(s) Oral every 12 hours  insulin lispro (HumaLOG) corrective regimen sliding scale   SubCutaneous every 6 hours  dextrose 50% Injectable 25 Gram(s) IV Push once  octreotide  Injectable 50 MICROGram(s) SubCutaneous every 8 hours  pantoprazole    Tablet 40 milliGRAM(s) Oral before breakfast    MEDICATIONS  (PRN):  dextrose Gel 1 Dose(s) Oral once PRN Blood Glucose LESS THAN 70 milliGRAM(s)/deciliter  glucagon  Injectable 1 milliGRAM(s) IntraMuscular once PRN Glucose LESS THAN 70 milligrams/deciliter    Social History: marries, lives with wife, no smoking/ alcohol    Family History: noncontributory    Review of Systems:  General:  No weightt loss, fevers, chills, night sweats,fatigue,   CV:  see HPI  RESP:no cough, tachypnea, wheezing  GI:see HPI  :  No pain, bleeding, incontinence, nocturia  Muscle:  No pain, weakness  Neuro:  No weakness, tingling, memory problems  Heme:  No petechiae, ecchymosis, easy bruisability  Skin:  No rash, edema    Vital Signs Last 24 Hrs  T(C): 36.4 (06 Sep 2017 05:39), Max: 36.8 (05 Sep 2017 19:31)  T(F): 97.5 (06 Sep 2017 05:39), Max: 98.3 (05 Sep 2017 19:31)  HR: 83 (06 Sep 2017 05:39) (82 - 105)  BP: 124/77 (06 Sep 2017 05:39) (100/62 - 146/84)  BP(mean): --  RR: 18 (06 Sep 2017 05:39) (18 - 18)  SpO2: 95% (06 Sep 2017 05:39) (95% - 98%)    PHYSICAL EXAM:  Constitutional:healthy apperaing male, in NAD  HEENT: NCAT, no icterus, no LAD  Cardiovascular: RRR, S1 and S2,   Respiratory: CTAB   Gastrointestinal: soft, NTND, normactive bowel sounds, no palpable HSM   Extremities: No peripheral edema, neg clubing, cyanosis  Skin: No rashes, jaundice    LABS:                        6.3    5.30  )-----------( 136      ( 06 Sep 2017 08:49 )             21.0     09-06    141  |  107  |  52<H>  ----------------------------<  104<H>  4.5   |  23  |  1.55<H>    Ca    8.4      06 Sep 2017 08:45  Phos  3.5     09-06  Mg     2.2     09-06    TPro  5.8<L>  /  Alb  3.3  /  TBili  0.4  /  DBili  x   /  AST  20  /  ALT  16  /  AlkPhos  187<H>  09-05    PT/INR - ( 05 Sep 2017 17:36 )   PT: 13.6 sec;   INR: 1.25 ratio         PTT - ( 05 Sep 2017 17:36 )  PTT:32.9 sec    LIVER FUNCTIONS - ( 05 Sep 2017 17:36 )  Alb: 3.3 g/dL / Pro: 5.8 g/dL / ALK PHOS: 187 U/L / ALT: 16 U/L RC / AST: 20 U/L / GGT: x             RADIOLOGY & ADDITIONAL TESTS:

## 2017-09-06 NOTE — PROGRESS NOTE ADULT - ASSESSMENT
80 y/o male with PMH of HTN, HLD, DM type2, controlled with oral hypoglycemic meds, A1c 6.4, MI (1970), CAD with 2 stent placement in June 2015, pAfib (no AC 2/2 GIB), Lymphedema, Iron deficiency anemia, sent in for outpt labs showing hgb of ~ 5.6 (baseline ~ 8, though transfusion dependent) in setting of capsule endoscopy showing upper intestinal bleed missed by recent c-scope and EGD.

## 2017-09-06 NOTE — PROGRESS NOTE ADULT - PROBLEM SELECTOR PLAN 5
- c/w coreg; hold long-acting cardizem and if stable H&H tomorrow will resume it tomorrow.  -holding all anticoagulation. - c/w coreg; hold long-acting cardizem and if stable H&H tomorrow will resume it tomorrow.  - holding all anticoagulation.  - Has been evaluated for Watchman device by Dr. Magdiel Trevino at [facility].

## 2017-09-07 ENCOUNTER — TRANSCRIPTION ENCOUNTER (OUTPATIENT)
Age: 82
End: 2017-09-07

## 2017-09-07 VITALS
RESPIRATION RATE: 18 BRPM | DIASTOLIC BLOOD PRESSURE: 85 MMHG | OXYGEN SATURATION: 97 % | SYSTOLIC BLOOD PRESSURE: 151 MMHG | TEMPERATURE: 98 F | HEART RATE: 76 BPM

## 2017-09-07 LAB
ANION GAP SERPL CALC-SCNC: 16 MMOL/L — SIGNIFICANT CHANGE UP (ref 5–17)
BUN SERPL-MCNC: 51 MG/DL — HIGH (ref 7–23)
CALCIUM SERPL-MCNC: 8.4 MG/DL — SIGNIFICANT CHANGE UP (ref 8.4–10.5)
CHLORIDE SERPL-SCNC: 106 MMOL/L — SIGNIFICANT CHANGE UP (ref 96–108)
CO2 SERPL-SCNC: 20 MMOL/L — LOW (ref 22–31)
CREAT SERPL-MCNC: 1.61 MG/DL — HIGH (ref 0.5–1.3)
GLUCOSE SERPL-MCNC: 167 MG/DL — HIGH (ref 70–99)
HCT VFR BLD CALC: 25.2 % — LOW (ref 39–50)
HGB BLD-MCNC: 7.8 G/DL — LOW (ref 13–17)
IRON SATN MFR SERPL: 24 UG/DL — LOW (ref 45–165)
MCHC RBC-ENTMCNC: 28 PG — SIGNIFICANT CHANGE UP (ref 27–34)
MCHC RBC-ENTMCNC: 31 GM/DL — LOW (ref 32–36)
MCV RBC AUTO: 90.3 FL — SIGNIFICANT CHANGE UP (ref 80–100)
PLATELET # BLD AUTO: 146 K/UL — LOW (ref 150–400)
POTASSIUM SERPL-MCNC: 5.4 MMOL/L — HIGH (ref 3.5–5.3)
POTASSIUM SERPL-SCNC: 5.4 MMOL/L — HIGH (ref 3.5–5.3)
RBC # BLD: 2.79 M/UL — LOW (ref 4.2–5.8)
RBC # FLD: 17.3 % — HIGH (ref 10.3–14.5)
SODIUM SERPL-SCNC: 142 MMOL/L — SIGNIFICANT CHANGE UP (ref 135–145)
WBC # BLD: 6.03 K/UL — SIGNIFICANT CHANGE UP (ref 3.8–10.5)
WBC # FLD AUTO: 6.03 K/UL — SIGNIFICANT CHANGE UP (ref 3.8–10.5)

## 2017-09-07 PROCEDURE — 99239 HOSP IP/OBS DSCHRG MGMT >30: CPT

## 2017-09-07 PROCEDURE — 99233 SBSQ HOSP IP/OBS HIGH 50: CPT

## 2017-09-07 RX ORDER — ASPIRIN/CALCIUM CARB/MAGNESIUM 324 MG
0 TABLET ORAL
Qty: 0 | Refills: 0 | COMMUNITY

## 2017-09-07 RX ADMIN — Medication 1: at 08:44

## 2017-09-07 RX ADMIN — PANTOPRAZOLE SODIUM 40 MILLIGRAM(S): 20 TABLET, DELAYED RELEASE ORAL at 08:02

## 2017-09-07 RX ADMIN — OCTREOTIDE ACETATE 50 MICROGRAM(S): 200 INJECTION, SOLUTION INTRAVENOUS; SUBCUTANEOUS at 13:22

## 2017-09-07 RX ADMIN — Medication 3: at 13:22

## 2017-09-07 RX ADMIN — CARVEDILOL PHOSPHATE 6.25 MILLIGRAM(S): 80 CAPSULE, EXTENDED RELEASE ORAL at 05:18

## 2017-09-07 RX ADMIN — OCTREOTIDE ACETATE 50 MICROGRAM(S): 200 INJECTION, SOLUTION INTRAVENOUS; SUBCUTANEOUS at 05:18

## 2017-09-07 RX ADMIN — Medication 500 MILLIGRAM(S): at 13:40

## 2017-09-07 RX ADMIN — Medication 1 TABLET(S): at 13:41

## 2017-09-07 NOTE — PROGRESS NOTE ADULT - SUBJECTIVE AND OBJECTIVE BOX
HPI:  82 year old man with HTN, DM type2 controlled with oral hypoglycemic meds, CAD stents June 2015, L carotid stenosis, CEA 2015, paroxysmal and now permanent Afib, AC stopped due to refractory GIB, Sent to hospital by hematologist for very profound anemia, hgb 5.6 (baseline ~ 8), transfusion dependent. Blood related antibodies identified, blood match not able to be achieved thus sent for admission. Capsule endoscopy this past week with Dr. Santos indicated upper intestinal bleed. Has been feeling greater fatigue in past 2 weeks with lightheadedness on ambulation and greater PARRISH, but no chest pain and no dyspnea at rest. Notes tarry stools, denies diarrhea, abdominal pain, vomiting, CP, palpitations.    Has been off warfarin and clopidogrel for past year due to GIB and transfusion requirement. Being evaluated for Watchman device by Dr. Magdiel Trevino at [facility], but no set plan to proceed.    Medications:  atorvastatin 20 milliGRAM(s) Oral at bedtime  ascorbic acid 500 milliGRAM(s) Oral daily  multivitamin/minerals 1 Tablet(s) Oral daily  carvedilol 6.25 milliGRAM(s) Oral every 12 hours  dextrose Gel 1 Dose(s) Oral once PRN  dextrose 50% Injectable 25 Gram(s) IV Push once  glucagon  Injectable 1 milliGRAM(s) IntraMuscular once PRN  octreotide  Injectable 50 MICROGram(s) SubCutaneous every 8 hours  pantoprazole    Tablet 40 milliGRAM(s) Oral before breakfast  insulin lispro (HumaLOG) corrective regimen sliding scale   SubCutaneous three times a day before meals  insulin lispro (HumaLOG) corrective regimen sliding scale   SubCutaneous at bedtime  diltiazem    milliGRAM(s) Oral daily    PMH/PSH/FH/SH:  Unchanged    ROS:  · Negative General Symptoms	fatigue	  · General Symptoms	weight gain	  · Skin/Breast	negative	  · Ophthalmologic	negative	  · ENMT	negative	  · Respiratory and Thorax	negative	  · Cardiovascular	negative	  · Gastrointestinal Symptoms	melena	  · Genitourinary	negative	  · Musculoskeletal	negative	  · Neurological	negative	  · Psychiatric Symptoms	depression; anxiety	  · Hematology/Lymphatics	negative	  · Endocrine	negative	  · Allergic/Immunologic	negative	      Vitals:  T(C): 36.6 (09-07-17 @ 04:58), Max: 36.7 (09-06-17 @ 14:10)  HR: 76 (09-07-17 @ 04:58) (63 - 100)  BP: 151/85 (09-07-17 @ 04:58) (123/75 - 155/79)  BP(mean): --  RR: 18 (09-07-17 @ 04:58) (18 - 18)  SpO2: 97% (09-07-17 @ 04:58) (96% - 99%)  Wt(kg): --  Daily     Daily     Physical exam:  · Constitutional	Well-developed, well nourished	  · Eyes	EOMI; PERRL; no drainage or redness	  · ENMT	No oral lesions; no gross abnormalities	  · Neck	No bruits; no thyromegaly or nodules	  · Respiratory	Breath Sounds equal & clear to percussion & auscultation, no accessory muscle use	  · Cardiovascular	detailed exam	  · Cardiovascular Details	irregular rate and rhythm; positive S1; positive S2	  · Gastrointestinal	Soft, non-tender, no hepatosplenomegaly, normal bowel sounds	  · Genitourinary	not examined	  · Extremities	No cyanosis, clubbing or edema	  · Vascular	Equal and normal pulses (carotid, femoral, dorsalis pedis)	  · Neurological	Alert & oriented; no sensory, motor or coordination deficits, normal reflexes	  · Skin	No lesions; no rash	  · Musculoskeletal	No joint pain, swelling or deformity; no limitation of movement	  · Psychiatric	Affect and characteristics of appearance, verbalizations, behaviors are appropriate	      I&O's Summary    06 Sep 2017 07:01  -  07 Sep 2017 07:00  --------------------------------------------------------  IN: 720 mL / OUT: 0 mL / NET: 720 mL                              7.8    6.03  )-----------( 146      ( 07 Sep 2017 09:03 )             25.2       09-07    142  |  106  |  51<H>  ----------------------------<  167<H>  5.4<H>   |  20<L>  |  1.61<H>    Ca    8.4      07 Sep 2017 09:12  Phos  3.5     09-06  Mg     2.2     09-06    TPro  5.8<L>  /  Alb  3.3  /  TBili  0.4  /  DBili  x   /  AST  20  /  ALT  16  /  AlkPhos  187<H>  09-05    PT/INR - ( 05 Sep 2017 17:36 )   PT: 13.6 sec;   INR: 1.25 ratio         PTT - ( 05 Sep 2017 17:36 )  PTT:32.9 sec

## 2017-09-07 NOTE — DISCHARGE NOTE ADULT - CARE PLAN
Principal Discharge DX:	Acute blood loss anemia  Goal:	Please follow-up with your hematologist and Gastroenterologist  Instructions for follow-up, activity and diet:	You presented to the hospital with low hemoglobin.  You were given 3 unit of red blood cells and your hemoglobin improved.  You had no further bleeding while in the hospital.  You should follow-up with your gastroenterologist and hematologist as an outpatient next week for repeat labs and possible need for transfusions.  Further workup of you bleeding will be done as an outpatient. Principal Discharge DX:	Acute blood loss anemia  Goal:	Please follow-up with your hematologist and Gastroenterologist  Instructions for follow-up, activity and diet:	You presented to the hospital with low hemoglobin.  You were given 3 units of red blood cells and your hemoglobin improved.  You had no further bleeding while in the hospital.  You should follow-up with your gastroenterologist and hematologist as an outpatient next week for repeat labs and possible need for transfusions.  Further workup of you bleeding will be done as an outpatient.  Please continue to take your octreotide injections as prescribed by Dr. Santos.  Please hold taking aspirin until you see your hematologist next week.

## 2017-09-07 NOTE — PROGRESS NOTE ADULT - PROBLEM SELECTOR PLAN 4
- cont w/ lipitor and coreg   - holding aspirin and placvix given GIB  - will hold ARB given ZHAO
- cont w/ lipitor and coreg   - will hold ARB given ZHAO likely 2/2 prerenal in setting of anemia
Hoping for endoscopic resolution soon.

## 2017-09-07 NOTE — PROGRESS NOTE ADULT - ATTENDING COMMENTS
Acute blood loss anemia from chronic GI source, suspect AVMs.  Transfuse to keep Hgb 7-8.  Octreotide TID, appreciate GI input, no plans for endoscopy this admission at present.
d/c planning, total d/c time 35 minutes

## 2017-09-07 NOTE — PROGRESS NOTE ADULT - SUBJECTIVE AND OBJECTIVE BOX
Shannan Houser MD  PGY-3, Medicine Team 3  pager: [phone]    Patient is a 82y old  Male who presents with a chief complaint of 82 M presents for Hgb 5.6 on outpt labs / GI bleed (05 Sep 2017 19:37)      SUBJECTIVE / OVERNIGHT EVENTS:  No acute events overnight.  No bleeding episodes, feels well.  Denies any lightheadedness or dizziness.  Denies fevers, chills, n/v, dyspnea, chest pain. Tolerating regular diet without issue.        Vital Signs Last 24 Hrs  T(C): 36.6 (07 Sep 2017 04:58), Max: 36.7 (06 Sep 2017 14:10)  T(F): 97.9 (07 Sep 2017 04:58), Max: 98.1 (06 Sep 2017 14:10)  HR: 76 (07 Sep 2017 04:58) (63 - 100)  BP: 151/85 (07 Sep 2017 04:58) (123/75 - 155/79)  RR: 18 (07 Sep 2017 04:58) (18 - 18)  SpO2: 97% (07 Sep 2017 04:58) (96% - 99%)      PHYSICAL EXAM:  GENERAL: NAD, well-developed  HEAD:  Atraumatic  EYES: EOMI, conjunctiva and sclera clear  NECK: Supple  CHEST/LUNG: Clear to auscultation bilaterally; No wheezes  HEART: Regular rate and rhythm; No murmurs, rubs, or gallops  ABDOMEN: Soft, Nontender, Nondistended; Bowel sounds present  EXTREMITIES: Chronic BL LE hyperpigmentation and lymphedematous skin changes, bandage on R ankle, sensation intact  PSYCH: AAOx3  NEUROLOGY: non-focal        LABS:                        8.4    5.1   )-----------( 122      ( 06 Sep 2017 18:06 )             26.1     09-06    141  |  107  |  52<H>  ----------------------------<  104<H>  4.5   |  23  |  1.55<H>    Ca    8.4      06 Sep 2017 08:45  Phos  3.5     09-06  Mg     2.2     09-06    TPro  5.8<L>  /  Alb  3.3  /  TBili  0.4  /  DBili  x   /  AST  20  /  ALT  16  /  AlkPhos  187<H>  09-05          CAPILLARY BLOOD GLUCOSE  236 (06 Sep 2017 21:26)  239 (06 Sep 2017 16:44)  153 (06 Sep 2017 12:13)  102 (06 Sep 2017 08:09)        I&O's Summary    06 Sep 2017 07:01  -  07 Sep 2017 07:00  --------------------------------------------------------  IN: 720 mL / OUT: 0 mL / NET: 720 mL          MEDICATIONS  (STANDING):  atorvastatin 20 milliGRAM(s) Oral at bedtime  ascorbic acid 500 milliGRAM(s) Oral daily  multivitamin/minerals 1 Tablet(s) Oral daily  carvedilol 6.25 milliGRAM(s) Oral every 12 hours  dextrose 50% Injectable 25 Gram(s) IV Push once  octreotide  Injectable 50 MICROGram(s) SubCutaneous every 8 hours  pantoprazole    Tablet 40 milliGRAM(s) Oral before breakfast  insulin lispro (HumaLOG) corrective regimen sliding scale   SubCutaneous three times a day before meals  insulin lispro (HumaLOG) corrective regimen sliding scale   SubCutaneous at bedtime    MEDICATIONS  (PRN):  dextrose Gel 1 Dose(s) Oral once PRN Blood Glucose LESS THAN 70 milliGRAM(s)/deciliter  glucagon  Injectable 1 milliGRAM(s) IntraMuscular once PRN Glucose LESS THAN 70 milligrams/deciliter

## 2017-09-07 NOTE — PROGRESS NOTE ADULT - SUBJECTIVE AND OBJECTIVE BOX
SUBJECTIVE: more energetic. BMs lightening in color    MEDICATIONS  (STANDING):  atorvastatin 20 milliGRAM(s) Oral at bedtime  ascorbic acid 500 milliGRAM(s) Oral daily  multivitamin/minerals 1 Tablet(s) Oral daily  carvedilol 6.25 milliGRAM(s) Oral every 12 hours  dextrose 50% Injectable 25 Gram(s) IV Push once  octreotide  Injectable 50 MICROGram(s) SubCutaneous every 8 hours  pantoprazole    Tablet 40 milliGRAM(s) Oral before breakfast  insulin lispro (HumaLOG) corrective regimen sliding scale   SubCutaneous three times a day before meals  insulin lispro (HumaLOG) corrective regimen sliding scale   SubCutaneous at bedtime  diltiazem    milliGRAM(s) Oral daily    MEDICATIONS  (PRN):  dextrose Gel 1 Dose(s) Oral once PRN Blood Glucose LESS THAN 70 milliGRAM(s)/deciliter  glucagon  Injectable 1 milliGRAM(s) IntraMuscular once PRN Glucose LESS THAN 70 milligrams/deciliter    OBJECTIVE:  Vital Signs Last 24 Hrs  T(C): 36.6 (07 Sep 2017 04:58), Max: 36.7 (06 Sep 2017 14:10)  T(F): 97.9 (07 Sep 2017 04:58), Max: 98.1 (06 Sep 2017 14:10)  HR: 76 (07 Sep 2017 04:58) (63 - 100)  BP: 151/85 (07 Sep 2017 04:58) (123/75 - 155/79)  BP(mean): --  RR: 18 (07 Sep 2017 04:58) (18 - 18)  SpO2: 97% (07 Sep 2017 04:58) (96% - 99%)    PHYSICAL EXAM:  Constitutional: A&OX3, in NAD,   HEENT: NCAT, no scleral icterus, no palpable LAD  Gastrointestinal: soft, NTND,   Extremities: + LE edema b/l     LABS:                        7.8    6.03  )-----------( 146      ( 07 Sep 2017 09:03 )             25.2     09-07    142  |  106  |  51<H>  ----------------------------<  167<H>  5.4<H>   |  20<L>  |  1.61<H>    Ca    8.4      07 Sep 2017 09:12  Phos  3.5     09-06  Mg     2.2     09-06    TPro  5.8<L>  /  Alb  3.3  /  TBili  0.4  /  DBili  x   /  AST  20  /  ALT  16  /  AlkPhos  187<H>  09-05    PT/INR - ( 05 Sep 2017 17:36 )   PT: 13.6 sec;   INR: 1.25 ratio         PTT - ( 05 Sep 2017 17:36 )  PTT:32.9 sec    LIVER FUNCTIONS - ( 05 Sep 2017 17:36 )  Alb: 3.3 g/dL / Pro: 5.8 g/dL / ALK PHOS: 187 U/L / ALT: 16 U/L RC / AST: 20 U/L / GGT: x             RADIOLOGY & ADDITIONAL TESTS:

## 2017-09-07 NOTE — PROGRESS NOTE ADULT - ASSESSMENT
82 y/o male with PMH of HTN, HLD, DM type2, controlled with oral hypoglycemic meds, A1c 6.4, MI (1970), CAD with 2 stent placement in June 2015, pAfib (no AC 2/2 GIB), Lymphedema, Iron deficiency anemia, sent in for outpt labs showing hgb of ~ 5.6 (baseline ~ 8, though transfusion dependent) in setting of capsule endoscopy showing upper intestinal bleed missed by recent c-scope and EGD.

## 2017-09-07 NOTE — PROGRESS NOTE ADULT - ASSESSMENT
82 year old male with chronic GIB s/p extensive endoscopic workup. Pt receives periodic PRBCs as an outpatient. 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 Pt was started on octreotide for further management. He was found to be profoundly anemic with hgb 5.6. He was admitted for transfusion given development of antibodies. H&H improved post transfusion.   Regular diet as tolerated  C/w octreotide sc q8  Monitor H&H, transfuse prn  O/p f/u with Dr. Tadeo Santos

## 2017-09-07 NOTE — DISCHARGE NOTE ADULT - PATIENT PORTAL LINK FT
“You can access the [employer] Patient Portal, offered by [facility], by registering with the following website: [email or web address]”

## 2017-09-07 NOTE — DISCHARGE NOTE ADULT - PLAN OF CARE
Please follow-up with your hematologist and Gastroenterologist You presented to the hospital with low hemoglobin.  You were given 3 unit of red blood cells and your hemoglobin improved.  You had no further bleeding while in the hospital.  You should follow-up with your gastroenterologist and hematologist as an outpatient next week for repeat labs and possible need for transfusions.  Further workup of you bleeding will be done as an outpatient. You presented to the hospital with low hemoglobin.  You were given 3 units of red blood cells and your hemoglobin improved.  You had no further bleeding while in the hospital.  You should follow-up with your gastroenterologist and hematologist as an outpatient next week for repeat labs and possible need for transfusions.  Further workup of you bleeding will be done as an outpatient.  Please continue to take your octreotide injections as prescribed by Dr. Santos.  Please hold taking aspirin until you see your hematologist next week.

## 2017-09-07 NOTE — PROGRESS NOTE ADULT - PROBLEM SELECTOR PLAN 1
Currently without any further bleeding, hgb 8.4 yesterday s/p 3u pRBCs.   - followed by outpatient GI - Dr. Choe.  No plans for intervention at this time  - c/w octreotide 50mcg SQ q8h and protonix 40 PO   - holding aspirin and plavix

## 2017-09-07 NOTE — DISCHARGE NOTE ADULT - MEDICATION SUMMARY - MEDICATIONS TO TAKE
I will START or STAY ON the medications listed below when I get home from the hospital:    Cardizem  mg/24 hours oral capsule, extended release  -- 1 cap(s) by mouth once a day (at bedtime)  -- Indication: For Coronary artery disease due to calcified coronary lesion    glipiZIDE 5 mg oral tablet  -- 1 tab(s) by mouth 2 times a day  -- Indication: For Diabetes    atorvastatin 20 mg oral tablet  -- 1 tab(s) by mouth once a day (at bedtime)  -- Indication: For Coronary artery disease due to calcified coronary lesion    CARVEDILOL 6.25 MG TABS  -- Indication: For Coronary artery disease due to calcified coronary lesion    FUROSEMIDE 40 MG TABS  -- Indication: For Coronary artery disease due to calcified coronary lesion    pantoprazole 40 mg oral delayed release tablet  -- 1 tab(s) by mouth 2 times a day (before meals)  -- Indication: For Gastrointestinal hemorrhage    OCTREOTIDE ACETATE 50 MCG/ML SOLN  -- Indication: For Gastrointestinal hemorrhage    multivitamin  -- 1 tab(s) by mouth once a day  -- Indication: For Vitamin    PreserVision oral tablet  -- 1 tab(s) by mouth 2 times a day  -- Indication: For Vitamin    ascorbic acid 500 mg oral tablet  -- 2 tab(s) by mouth once a day  -- Indication: For Vitamin I will START or STAY ON the medications listed below when I get home from the hospital:    Cardizem  mg/24 hours oral capsule, extended release  -- 1 cap(s) by mouth once a day (at bedtime)  -- Indication: For Coronary artery disease due to calcified coronary lesion    glipiZIDE 5 mg oral tablet  -- 1 tab(s) by mouth 2 times a day  -- Indication: For Diabetes    atorvastatin 20 mg oral tablet  -- 1 tab(s) by mouth once a day (at bedtime)  -- Indication: For Coronary artery disease due to calcified coronary lesion    CARVEDILOL 6.25 MG TABS  -- 1 tab(s) by mouth 2 times a day  -- Indication: For Coronary artery disease due to calcified coronary lesion    FUROSEMIDE 40 MG TABS  -- 1 tab(s) by mouth once a day  -- Indication: For Coronary artery disease due to calcified coronary lesion    pantoprazole 40 mg oral delayed release tablet  -- 1 tab(s) by mouth 2 times a day (before meals)  -- Indication: For Gastrointestinal hemorrhage    OCTREOTIDE ACETATE 50 MCG/ML SOLN  -- Indication: For Gastrointestinal hemorrhage    PreserVision oral tablet  -- 1 tab(s) by mouth 2 times a day  -- Indication: For Vitamin    multivitamin  -- 1 tab(s) by mouth once a day  -- Indication: For Vitamin    ascorbic acid 500 mg oral tablet  -- 2 tab(s) by mouth once a day  -- Indication: For Vitamin

## 2017-09-07 NOTE — PROGRESS NOTE ADULT - PROBLEM SELECTOR PLAN 7
- hold AC in setting of GIB, will start ICDs    Shannan Houser MD  PGY-3, Medicine Team 3  pager: [phone]

## 2017-09-07 NOTE — PROGRESS NOTE ADULT - ASSESSMENT
82 year old man with known coronary disease, coronary stents in LAD, atrial fibrillation necessarily off anticoagulation for past year. Currently off all antiplatelet therapy as well, but will need to resume aspirin very soon as risk of stent thrombosis with a permanent concern. Despite profound anemia is remarkably without manifestations of myocardial ischemia.

## 2017-09-07 NOTE — DISCHARGE NOTE ADULT - CARE PROVIDER_API CALL
[address]), Internal Medicine  [address], [place] [id number]  Phone: [phone]  Fax: [phone]    Tadeo Santos  Phone: (   )    -  Fax: (   )    -

## 2017-09-07 NOTE — PROGRESS NOTE ADULT - PROBLEM SELECTOR PLAN 3
- plan as above for GIB, pt states has been getting IV iron as oupatient
- plan as above for GIB, pt states has been getting IV iron as oupt, will cont here
Chronic stable.  No management.

## 2017-09-07 NOTE — DISCHARGE NOTE ADULT - MEDICATION SUMMARY - MEDICATIONS TO STOP TAKING
I will STOP taking the medications listed below when I get home from the hospital:    aspirin 81 mg oral delayed release capsule  --  by mouth    CARTIA  MG CP24

## 2017-09-07 NOTE — PROGRESS NOTE ADULT - PROBLEM SELECTOR PROBLEM 3
Right bundle branch block
Iron deficiency anemia due to chronic blood loss
Iron deficiency anemia due to chronic blood loss

## 2017-09-07 NOTE — PROGRESS NOTE ADULT - PROBLEM SELECTOR PLAN 2
- likely pre-renal 2/2 GIB   - hold ARB and diuretics, monitor creatinine
- likely pre-renal 2/2 GIB, improving  - hold ARB and diuretics, monitor creatinine
stable and rate controlled.  some risk off anticoagulation, but risk of anticoagulation for now remains greater.  Has been evaluated for Watchman device by Dr. Magdiel Trevino at [facility].

## 2017-09-07 NOTE — PROGRESS NOTE ADULT - PROBLEM SELECTOR PROBLEM 4
Iron deficiency anemia due to chronic blood loss
Coronary artery disease due to calcified coronary lesion
Coronary artery disease due to calcified coronary lesion

## 2017-09-07 NOTE — DISCHARGE NOTE ADULT - HOSPITAL COURSE
82 y/o male with PMH of HTN, HLD, DM type2, controlled with oral hypoglycemic meds, A1c 6.4, MI (1970), CAD with 2 stent placement in June 2015, pAfib (no AC 2/2 GIB), Lymphedema, Iron deficiency anemia, sent in for outpt labs showing hgb of ~ 5.6 (baseline ~ 8, though transfusion dependent).  Patient received 3u pRBCs while admitted.  No further bleeding was noted and he was hemodynamically stable.  He was continued on protonix 40mg PO daily and octreotide 50mch SQ q8h.  He was evaluated by his outpatient GI provider and no intervention was recommended at this time.  Patient was discharged home and will follow-up with her primary care doctor and gastroenterologist as an outpatient.

## 2017-09-07 NOTE — PROGRESS NOTE ADULT - PROBLEM SELECTOR PLAN 5
- c/w coreg  - holding home cardizem  for now - if hgb stabilizes, will resume  - holding all anticoagulation.  - Has been evaluated for Watchman device by Dr. Magdiel Trevino at [facility].

## 2017-09-19 ENCOUNTER — APPOINTMENT (OUTPATIENT)
Dept: INTERNAL MEDICINE | Facility: CLINIC | Age: 82
End: 2017-09-19
Payer: MEDICARE

## 2017-09-19 VITALS
HEART RATE: 80 BPM | RESPIRATION RATE: 16 BRPM | SYSTOLIC BLOOD PRESSURE: 120 MMHG | BODY MASS INDEX: 35.87 KG/M2 | DIASTOLIC BLOOD PRESSURE: 70 MMHG | WEIGHT: 250 LBS

## 2017-09-19 DIAGNOSIS — Z23 ENCOUNTER FOR IMMUNIZATION: ICD-10-CM

## 2017-09-19 PROCEDURE — 90662 IIV NO PRSV INCREASED AG IM: CPT

## 2017-09-19 PROCEDURE — 99214 OFFICE O/P EST MOD 30 MIN: CPT | Mod: 25

## 2017-09-19 PROCEDURE — G0008: CPT

## 2017-09-19 RX ORDER — SODIUM FLUORIDE 6.1 MG/ML
1.1 GEL, DENTIFRICE DENTAL
Qty: 100 | Refills: 0 | Status: DISCONTINUED | COMMUNITY
Start: 2017-05-01 | End: 2017-09-19

## 2017-09-19 RX ORDER — PREDNISONE 5 MG/1
5 TABLET ORAL
Qty: 90 | Refills: 0 | Status: DISCONTINUED | COMMUNITY
Start: 2017-05-04 | End: 2017-09-19

## 2017-09-19 RX ORDER — ASPIRIN 81 MG/1
81 TABLET, CHEWABLE ORAL
Qty: 100 | Refills: 0 | Status: DISCONTINUED | COMMUNITY
Start: 2017-04-28 | End: 2017-09-19

## 2017-09-28 ENCOUNTER — APPOINTMENT (OUTPATIENT)
Dept: INTERNAL MEDICINE | Facility: CLINIC | Age: 82
End: 2017-09-28
Payer: MEDICARE

## 2017-09-28 VITALS
BODY MASS INDEX: 34.29 KG/M2 | DIASTOLIC BLOOD PRESSURE: 70 MMHG | RESPIRATION RATE: 18 BRPM | HEART RATE: 80 BPM | SYSTOLIC BLOOD PRESSURE: 120 MMHG | WEIGHT: 239 LBS

## 2017-09-28 PROCEDURE — 99214 OFFICE O/P EST MOD 30 MIN: CPT

## 2017-10-06 ENCOUNTER — OUTPATIENT (OUTPATIENT)
Dept: OUTPATIENT SERVICES | Facility: HOSPITAL | Age: 82
LOS: 1 days | Discharge: HOME | End: 2017-10-06

## 2017-10-06 DIAGNOSIS — Z98.89 OTHER SPECIFIED POSTPROCEDURAL STATES: Chronic | ICD-10-CM

## 2017-10-06 DIAGNOSIS — D64.9 ANEMIA, UNSPECIFIED: ICD-10-CM

## 2017-10-09 ENCOUNTER — OUTPATIENT (OUTPATIENT)
Dept: OUTPATIENT SERVICES | Facility: HOSPITAL | Age: 82
LOS: 1 days | End: 2017-10-09

## 2017-10-09 DIAGNOSIS — D64.9 ANEMIA, UNSPECIFIED: ICD-10-CM

## 2017-10-09 DIAGNOSIS — Z98.89 OTHER SPECIFIED POSTPROCEDURAL STATES: Chronic | ICD-10-CM

## 2017-10-11 ENCOUNTER — APPOINTMENT (OUTPATIENT)
Dept: INFUSION THERAPY | Facility: HOSPITAL | Age: 82
End: 2017-10-11

## 2017-10-11 ENCOUNTER — APPOINTMENT (OUTPATIENT)
Age: 82
End: 2017-10-11

## 2017-10-16 ENCOUNTER — APPOINTMENT (OUTPATIENT)
Dept: INTERNAL MEDICINE | Facility: CLINIC | Age: 82
End: 2017-10-16
Payer: MEDICARE

## 2017-10-16 ENCOUNTER — RX RENEWAL (OUTPATIENT)
Age: 82
End: 2017-10-16

## 2017-10-16 VITALS
SYSTOLIC BLOOD PRESSURE: 120 MMHG | BODY MASS INDEX: 31.5 KG/M2 | RESPIRATION RATE: 16 BRPM | DIASTOLIC BLOOD PRESSURE: 70 MMHG | WEIGHT: 220 LBS | HEIGHT: 70 IN | HEART RATE: 76 BPM

## 2017-10-16 PROCEDURE — 99214 OFFICE O/P EST MOD 30 MIN: CPT

## 2017-10-18 ENCOUNTER — APPOINTMENT (OUTPATIENT)
Dept: CARDIOLOGY | Facility: CLINIC | Age: 82
End: 2017-10-18
Payer: MEDICARE

## 2017-10-18 PROCEDURE — 93306 TTE W/DOPPLER COMPLETE: CPT

## 2017-10-19 PROCEDURE — 83540 ASSAY OF IRON: CPT

## 2017-10-19 PROCEDURE — 84100 ASSAY OF PHOSPHORUS: CPT

## 2017-10-19 PROCEDURE — 84295 ASSAY OF SERUM SODIUM: CPT

## 2017-10-19 PROCEDURE — 86922 COMPATIBILITY TEST ANTIGLOB: CPT

## 2017-10-19 PROCEDURE — 86901 BLOOD TYPING SEROLOGIC RH(D): CPT

## 2017-10-19 PROCEDURE — 80053 COMPREHEN METABOLIC PANEL: CPT

## 2017-10-19 PROCEDURE — 80048 BASIC METABOLIC PNL TOTAL CA: CPT

## 2017-10-19 PROCEDURE — 36430 TRANSFUSION BLD/BLD COMPNT: CPT

## 2017-10-19 PROCEDURE — 84132 ASSAY OF SERUM POTASSIUM: CPT

## 2017-10-19 PROCEDURE — P9040: CPT

## 2017-10-19 PROCEDURE — 85027 COMPLETE CBC AUTOMATED: CPT

## 2017-10-19 PROCEDURE — 86850 RBC ANTIBODY SCREEN: CPT

## 2017-10-19 PROCEDURE — 86902 BLOOD TYPE ANTIGEN DONOR EA: CPT

## 2017-10-19 PROCEDURE — 85610 PROTHROMBIN TIME: CPT

## 2017-10-19 PROCEDURE — 96374 THER/PROPH/DIAG INJ IV PUSH: CPT

## 2017-10-19 PROCEDURE — 86870 RBC ANTIBODY IDENTIFICATION: CPT

## 2017-10-19 PROCEDURE — 99285 EMERGENCY DEPT VISIT HI MDM: CPT | Mod: 25

## 2017-10-19 PROCEDURE — 82435 ASSAY OF BLOOD CHLORIDE: CPT

## 2017-10-19 PROCEDURE — 82803 BLOOD GASES ANY COMBINATION: CPT

## 2017-10-19 PROCEDURE — 86900 BLOOD TYPING SEROLOGIC ABO: CPT

## 2017-10-19 PROCEDURE — 85730 THROMBOPLASTIN TIME PARTIAL: CPT

## 2017-10-19 PROCEDURE — 86880 COOMBS TEST DIRECT: CPT

## 2017-10-19 PROCEDURE — 82947 ASSAY GLUCOSE BLOOD QUANT: CPT

## 2017-10-19 PROCEDURE — 82330 ASSAY OF CALCIUM: CPT

## 2017-10-19 PROCEDURE — 85014 HEMATOCRIT: CPT

## 2017-10-19 PROCEDURE — 83605 ASSAY OF LACTIC ACID: CPT

## 2017-10-19 PROCEDURE — 82272 OCCULT BLD FECES 1-3 TESTS: CPT

## 2017-10-19 PROCEDURE — 83735 ASSAY OF MAGNESIUM: CPT

## 2017-10-26 ENCOUNTER — LABORATORY RESULT (OUTPATIENT)
Age: 82
End: 2017-10-26

## 2017-10-27 LAB
APPEARANCE: CLEAR
BILIRUBIN URINE: NEGATIVE
BLOOD URINE: NEGATIVE
COLOR: YELLOW
GLUCOSE QUALITATIVE U: NEGATIVE MG/DL
KETONES URINE: NEGATIVE
LEUKOCYTE ESTERASE URINE: NEGATIVE
NITRITE URINE: NEGATIVE
PH URINE: 6.5
PROTEIN URINE: 100 MG/DL
SPECIFIC GRAVITY URINE: 1.01
UROBILINOGEN URINE: 1 MG/DL

## 2017-10-30 ENCOUNTER — RX RENEWAL (OUTPATIENT)
Age: 82
End: 2017-10-30

## 2017-11-06 ENCOUNTER — APPOINTMENT (OUTPATIENT)
Dept: INTERNAL MEDICINE | Facility: CLINIC | Age: 82
End: 2017-11-06
Payer: MEDICARE

## 2017-11-06 VITALS
WEIGHT: 202 LBS | RESPIRATION RATE: 16 BRPM | DIASTOLIC BLOOD PRESSURE: 75 MMHG | HEART RATE: 78 BPM | SYSTOLIC BLOOD PRESSURE: 120 MMHG | BODY MASS INDEX: 28.98 KG/M2

## 2017-11-06 PROCEDURE — 99214 OFFICE O/P EST MOD 30 MIN: CPT

## 2017-11-06 RX ORDER — OCTREOTIDE ACETATE 50 UG/ML
50 INJECTION, SOLUTION INTRAVENOUS; SUBCUTANEOUS
Refills: 0 | Status: COMPLETED | COMMUNITY
Start: 2017-09-19 | End: 2017-11-30

## 2017-11-14 LAB
CREAT 24H UR-MCNC: 1 G/24 H
CREAT ?TM UR-MCNC: 43 MG/DL
PROT 24H UR-MRATE: 72 MG/DL
PROT ?TM UR-MCNC: 24 HR
PROT UR-MCNC: 1674 MG/24 H
SPECIMEN VOL 24H UR: 2325 ML

## 2017-11-16 ENCOUNTER — APPOINTMENT (OUTPATIENT)
Dept: CARDIOLOGY | Facility: CLINIC | Age: 82
End: 2017-11-16
Payer: MEDICARE

## 2017-11-16 ENCOUNTER — NON-APPOINTMENT (OUTPATIENT)
Age: 82
End: 2017-11-16

## 2017-11-16 VITALS
SYSTOLIC BLOOD PRESSURE: 112 MMHG | HEART RATE: 84 BPM | WEIGHT: 197 LBS | HEIGHT: 70 IN | DIASTOLIC BLOOD PRESSURE: 58 MMHG | BODY MASS INDEX: 28.2 KG/M2

## 2017-11-16 PROCEDURE — 93000 ELECTROCARDIOGRAM COMPLETE: CPT

## 2017-11-16 PROCEDURE — 99215 OFFICE O/P EST HI 40 MIN: CPT

## 2017-11-20 ENCOUNTER — APPOINTMENT (OUTPATIENT)
Dept: INTERNAL MEDICINE | Facility: CLINIC | Age: 82
End: 2017-11-20
Payer: MEDICARE

## 2017-11-20 ENCOUNTER — LABORATORY RESULT (OUTPATIENT)
Age: 82
End: 2017-11-20

## 2017-11-20 VITALS
SYSTOLIC BLOOD PRESSURE: 120 MMHG | DIASTOLIC BLOOD PRESSURE: 70 MMHG | RESPIRATION RATE: 16 BRPM | HEART RATE: 76 BPM | WEIGHT: 200 LBS | BODY MASS INDEX: 28.7 KG/M2

## 2017-11-20 DIAGNOSIS — K83.0 CHOLANGITIS: ICD-10-CM

## 2017-11-20 DIAGNOSIS — R80.9 PROTEINURIA, UNSPECIFIED: ICD-10-CM

## 2017-11-20 LAB
ALBUMIN SERPL ELPH-MCNC: 3.5 G/DL
ALP BLD-CCNC: 557 U/L
ALT SERPL-CCNC: 46 U/L
AST SERPL-CCNC: 52 U/L
BILIRUB DIRECT SERPL-MCNC: 0.3 MG/DL
BILIRUB INDIRECT SERPL-MCNC: 0.4 MG/DL
BILIRUB SERPL-MCNC: 0.7 MG/DL
GGT SERPL-CCNC: 300 U/L
HBA1C MFR BLD HPLC: 7.5 %
PROT SERPL-MCNC: 7.7 G/DL

## 2017-11-20 PROCEDURE — 36415 COLL VENOUS BLD VENIPUNCTURE: CPT

## 2017-11-20 PROCEDURE — 99214 OFFICE O/P EST MOD 30 MIN: CPT | Mod: 25

## 2017-11-21 PROBLEM — K83.0: Status: ACTIVE | Noted: 2017-11-21

## 2017-11-21 LAB
IGA 24H UR QL IFE: NORMAL
M PROTEIN SPEC IFE-MCNC: NORMAL

## 2017-11-26 LAB
ALP BLD-CCNC: 565 U/L
ALP BONE CFR SERPL: 13 %
ALP INTEST CFR SERPL: 0 %
ALP LIVER CFR SERPL: 69 %
ALP MACRO CFR SERPL: 18 %
ALP PLAC CFR SERPL: 0 %

## 2017-11-27 ENCOUNTER — RX RENEWAL (OUTPATIENT)
Age: 82
End: 2017-11-27

## 2017-11-30 ENCOUNTER — MEDICATION RENEWAL (OUTPATIENT)
Age: 82
End: 2017-11-30

## 2018-01-22 ENCOUNTER — RX RENEWAL (OUTPATIENT)
Age: 83
End: 2018-01-22

## 2018-01-29 ENCOUNTER — RX RENEWAL (OUTPATIENT)
Age: 83
End: 2018-01-29

## 2018-05-01 ENCOUNTER — APPOINTMENT (OUTPATIENT)
Dept: INTERNAL MEDICINE | Facility: CLINIC | Age: 83
End: 2018-05-01
Payer: MEDICARE

## 2018-05-01 VITALS
SYSTOLIC BLOOD PRESSURE: 130 MMHG | DIASTOLIC BLOOD PRESSURE: 70 MMHG | BODY MASS INDEX: 28.49 KG/M2 | HEIGHT: 70 IN | WEIGHT: 199 LBS | RESPIRATION RATE: 16 BRPM | HEART RATE: 72 BPM

## 2018-05-01 DIAGNOSIS — R79.89 OTHER SPECIFIED ABNORMAL FINDINGS OF BLOOD CHEMISTRY: ICD-10-CM

## 2018-05-01 PROCEDURE — 99214 OFFICE O/P EST MOD 30 MIN: CPT

## 2018-05-01 RX ORDER — LINAGLIPTIN 5 MG/1
5 TABLET, FILM COATED ORAL
Qty: 90 | Refills: 3 | Status: DISCONTINUED | COMMUNITY
Start: 2017-11-27 | End: 2018-05-01

## 2018-05-04 ENCOUNTER — NON-APPOINTMENT (OUTPATIENT)
Age: 83
End: 2018-05-04

## 2018-05-04 ENCOUNTER — APPOINTMENT (OUTPATIENT)
Dept: CARDIOLOGY | Facility: CLINIC | Age: 83
End: 2018-05-04
Payer: MEDICARE

## 2018-05-04 VITALS
OXYGEN SATURATION: 97 % | HEART RATE: 81 BPM | SYSTOLIC BLOOD PRESSURE: 125 MMHG | BODY MASS INDEX: 28.49 KG/M2 | WEIGHT: 199 LBS | DIASTOLIC BLOOD PRESSURE: 70 MMHG | HEIGHT: 70 IN

## 2018-05-04 PROCEDURE — 99215 OFFICE O/P EST HI 40 MIN: CPT

## 2018-05-04 PROCEDURE — 93000 ELECTROCARDIOGRAM COMPLETE: CPT

## 2018-05-21 ENCOUNTER — RX RENEWAL (OUTPATIENT)
Age: 83
End: 2018-05-21

## 2018-05-28 ENCOUNTER — RX RENEWAL (OUTPATIENT)
Age: 83
End: 2018-05-28

## 2018-06-04 ENCOUNTER — OUTPATIENT (OUTPATIENT)
Dept: OUTPATIENT SERVICES | Facility: HOSPITAL | Age: 83
LOS: 1 days | End: 2018-06-04
Payer: MEDICARE

## 2018-06-04 ENCOUNTER — APPOINTMENT (OUTPATIENT)
Dept: MRI IMAGING | Facility: CLINIC | Age: 83
End: 2018-06-04
Payer: MEDICARE

## 2018-06-04 DIAGNOSIS — Z98.89 OTHER SPECIFIED POSTPROCEDURAL STATES: Chronic | ICD-10-CM

## 2018-06-04 DIAGNOSIS — R26.89 OTHER ABNORMALITIES OF GAIT AND MOBILITY: ICD-10-CM

## 2018-06-04 PROCEDURE — 70551 MRI BRAIN STEM W/O DYE: CPT | Mod: 26

## 2018-06-04 PROCEDURE — 70551 MRI BRAIN STEM W/O DYE: CPT

## 2018-06-08 ENCOUNTER — APPOINTMENT (OUTPATIENT)
Dept: INTERNAL MEDICINE | Facility: CLINIC | Age: 83
End: 2018-06-08
Payer: MEDICARE

## 2018-06-08 VITALS
BODY MASS INDEX: 29.13 KG/M2 | HEART RATE: 76 BPM | SYSTOLIC BLOOD PRESSURE: 115 MMHG | DIASTOLIC BLOOD PRESSURE: 70 MMHG | RESPIRATION RATE: 16 BRPM | WEIGHT: 203 LBS

## 2018-06-08 VITALS — HEART RATE: 76 BPM | DIASTOLIC BLOOD PRESSURE: 65 MMHG | SYSTOLIC BLOOD PRESSURE: 115 MMHG

## 2018-06-08 DIAGNOSIS — E11.9 TYPE 2 DIABETES MELLITUS W/OUT COMPLICATIONS: ICD-10-CM

## 2018-06-08 PROCEDURE — 99214 OFFICE O/P EST MOD 30 MIN: CPT

## 2018-06-08 NOTE — HISTORY OF PRESENT ILLNESS
[FreeTextEntry1] : FU for diabetes, hypertension, hyperlipidemia, edema\par Seeing neurology for dizziness\par Was doing PT\par Watching diet - staying away from sugar.\par Little alcohol

## 2018-06-08 NOTE — PHYSICAL EXAM
[No Acute Distress] : no acute distress [Well Nourished] : well nourished [Well Developed] : well developed [Normal Sclera/Conjunctiva] : normal sclera/conjunctiva [Normal Oropharynx] : the oropharynx was normal [No JVD] : no jugular venous distention [Supple] : supple [No Lymphadenopathy] : no lymphadenopathy [No Respiratory Distress] : no respiratory distress  [Clear to Auscultation] : lungs were clear to auscultation bilaterally [No Accessory Muscle Use] : no accessory muscle use [Normal S1, S2] : normal S1 and S2 [Irregularly Irregular] : irregularly irregular [Soft] : abdomen soft [Non Tender] : non-tender [No HSM] : no HSM [Normal Supraclavicular Nodes] : no supraclavicular lymphadenopathy [Normal Posterior Cervical Nodes] : no posterior cervical lymphadenopathy [Normal Anterior Cervical Nodes] : no anterior cervical lymphadenopathy [No CVA Tenderness] : no CVA  tenderness [No Spinal Tenderness] : no spinal tenderness [No Focal Deficits] : no focal deficits [de-identified] : trace edema only

## 2018-06-08 NOTE — ASSESSMENT
[FreeTextEntry1] : Pt with multiple medical issues\par To reduce Metalozone to 2 x per week\par A1C and lipids with next blood draw\par FU 1 month

## 2018-06-15 ENCOUNTER — RX RENEWAL (OUTPATIENT)
Age: 83
End: 2018-06-15

## 2018-06-25 ENCOUNTER — RX RENEWAL (OUTPATIENT)
Age: 83
End: 2018-06-25

## 2018-07-10 ENCOUNTER — APPOINTMENT (OUTPATIENT)
Dept: INTERNAL MEDICINE | Facility: CLINIC | Age: 83
End: 2018-07-10
Payer: MEDICARE

## 2018-07-10 VITALS
SYSTOLIC BLOOD PRESSURE: 120 MMHG | BODY MASS INDEX: 28.63 KG/M2 | HEART RATE: 72 BPM | HEIGHT: 70 IN | RESPIRATION RATE: 16 BRPM | WEIGHT: 200 LBS | DIASTOLIC BLOOD PRESSURE: 60 MMHG

## 2018-07-10 PROCEDURE — 99214 OFFICE O/P EST MOD 30 MIN: CPT

## 2018-07-10 NOTE — PHYSICAL EXAM
[No Acute Distress] : no acute distress [Well Nourished] : well nourished [Well Developed] : well developed [Well-Appearing] : well-appearing [Normal Sclera/Conjunctiva] : normal sclera/conjunctiva [Normal Oropharynx] : the oropharynx was normal [No JVD] : no jugular venous distention [Supple] : supple [No Lymphadenopathy] : no lymphadenopathy [No Respiratory Distress] : no respiratory distress  [Clear to Auscultation] : lungs were clear to auscultation bilaterally [No Accessory Muscle Use] : no accessory muscle use [Normal S1, S2] : normal S1 and S2 [Irregularly Irregular] : irregularly irregular [Soft] : abdomen soft [Non Tender] : non-tender [No HSM] : no HSM [Normal Supraclavicular Nodes] : no supraclavicular lymphadenopathy [Normal Posterior Cervical Nodes] : no posterior cervical lymphadenopathy [Normal Anterior Cervical Nodes] : no anterior cervical lymphadenopathy [No Focal Deficits] : no focal deficits [Alert and Oriented x3] : oriented to person, place, and time [de-identified] : tr edema bilaterally

## 2018-07-10 NOTE — HISTORY OF PRESENT ILLNESS
[FreeTextEntry1] : FU for edema - no change in weight - on lower Metalazone\par No change in edema or breathing.

## 2018-07-10 NOTE — ASSESSMENT
[FreeTextEntry1] : Pt stable\par Going to [facility] Monday  -for consideration of Watchman procedure.\par FU with hematology.\par Continue current medical regimen for now\par FU 6-8 weeks.

## 2018-07-18 ENCOUNTER — APPOINTMENT (OUTPATIENT)
Dept: UROLOGY | Facility: CLINIC | Age: 83
End: 2018-07-18
Payer: MEDICARE

## 2018-07-18 VITALS
OXYGEN SATURATION: 98 % | HEIGHT: 70 IN | WEIGHT: 200 LBS | DIASTOLIC BLOOD PRESSURE: 81 MMHG | HEART RATE: 97 BPM | BODY MASS INDEX: 28.63 KG/M2 | TEMPERATURE: 97.9 F | SYSTOLIC BLOOD PRESSURE: 149 MMHG

## 2018-07-18 DIAGNOSIS — R33.8 OTHER POSTPROCEDURAL COMPLICATIONS AND DISORDERS OF GENITOURINARY SYSTEM: ICD-10-CM

## 2018-07-18 DIAGNOSIS — N99.89 OTHER POSTPROCEDURAL COMPLICATIONS AND DISORDERS OF GENITOURINARY SYSTEM: ICD-10-CM

## 2018-07-18 PROCEDURE — 99204 OFFICE O/P NEW MOD 45 MIN: CPT

## 2018-07-19 ENCOUNTER — RX RENEWAL (OUTPATIENT)
Age: 83
End: 2018-07-19

## 2018-07-23 ENCOUNTER — LABORATORY RESULT (OUTPATIENT)
Age: 83
End: 2018-07-23

## 2018-07-23 ENCOUNTER — APPOINTMENT (OUTPATIENT)
Dept: INTERNAL MEDICINE | Facility: CLINIC | Age: 83
End: 2018-07-23
Payer: MEDICARE

## 2018-07-23 VITALS
RESPIRATION RATE: 18 BRPM | WEIGHT: 192 LBS | DIASTOLIC BLOOD PRESSURE: 60 MMHG | BODY MASS INDEX: 27.49 KG/M2 | HEART RATE: 76 BPM | SYSTOLIC BLOOD PRESSURE: 120 MMHG | HEIGHT: 70 IN

## 2018-07-23 DIAGNOSIS — N40.0 BENIGN PROSTATIC HYPERPLASIA WITHOUT LOWER URINARY TRACT SYMPMS: ICD-10-CM

## 2018-07-23 LAB
DATE COLLECTED: ABNORMAL
HEMOCCULT SP1 STL QL: POSITIVE

## 2018-07-23 PROCEDURE — 99214 OFFICE O/P EST MOD 30 MIN: CPT | Mod: 25,PD

## 2018-07-23 PROCEDURE — 82272 OCCULT BLD FECES 1-3 TESTS: CPT | Mod: PD

## 2018-07-23 PROCEDURE — 36415 COLL VENOUS BLD VENIPUNCTURE: CPT | Mod: PD

## 2018-07-23 RX ORDER — ASCORBIC ACID 500 MG
500 TABLET ORAL DAILY
Refills: 0 | Status: ACTIVE | COMMUNITY
Start: 2018-07-23

## 2018-07-23 NOTE — ASSESSMENT
[FreeTextEntry1] : Pt is sp Watchman procedure\par Had  bleeding and now GI bleeding on ASA and Plavix\par To stop Metolazone\par To restart Protonix and increase Octreotide to daily  -pt to speak with GI\par Likely needs to fu with heme.

## 2018-07-23 NOTE — HISTORY OF PRESENT ILLNESS
[FreeTextEntry1] : Had watchman procedure - 1 week ago  - admitted to [facility] - complicated by urinating issues  -baig placed - had urinary retention.\par complicated by hematuria - Eating\par Had some dark BM's\par No fever\par Lightheaded more\par Blood in urine resolved -since Saturday\par Nocturia - every 20 minutes.\par

## 2018-07-23 NOTE — PHYSICAL EXAM
[No Acute Distress] : no acute distress [Well Nourished] : well nourished [Well Developed] : well developed [Well-Appearing] : well-appearing [Normal Sclera/Conjunctiva] : normal sclera/conjunctiva [Normal Outer Ear/Nose] : the outer ears and nose were normal in appearance [No JVD] : no jugular venous distention [Supple] : supple [No Lymphadenopathy] : no lymphadenopathy [No Respiratory Distress] : no respiratory distress  [Clear to Auscultation] : lungs were clear to auscultation bilaterally [No Accessory Muscle Use] : no accessory muscle use [Normal S1, S2] : normal S1 and S2 [Irregularly Irregular] : irregularly irregular [No Edema] : there was no peripheral edema [Soft] : abdomen soft [Non Tender] : non-tender [No HSM] : no HSM [Stool Occult Blood] : stool occult blood  [Normal Supraclavicular Nodes] : no supraclavicular lymphadenopathy [Normal Posterior Cervical Nodes] : no posterior cervical lymphadenopathy [Normal Anterior Cervical Nodes] : no anterior cervical lymphadenopathy

## 2018-07-24 ENCOUNTER — INPATIENT (INPATIENT)
Facility: HOSPITAL | Age: 83
LOS: 1 days | Discharge: HOME | DRG: 378 | End: 2018-07-26
Attending: INTERNAL MEDICINE | Admitting: HOSPITALIST
Payer: MEDICARE

## 2018-07-24 VITALS
WEIGHT: 192.02 LBS | OXYGEN SATURATION: 98 % | DIASTOLIC BLOOD PRESSURE: 70 MMHG | HEIGHT: 70.5 IN | HEART RATE: 120 BPM | RESPIRATION RATE: 16 BRPM | TEMPERATURE: 98 F | SYSTOLIC BLOOD PRESSURE: 131 MMHG

## 2018-07-24 DIAGNOSIS — N17.9 ACUTE KIDNEY FAILURE, UNSPECIFIED: ICD-10-CM

## 2018-07-24 DIAGNOSIS — Z98.89 OTHER SPECIFIED POSTPROCEDURAL STATES: Chronic | ICD-10-CM

## 2018-07-24 DIAGNOSIS — E78.5 HYPERLIPIDEMIA, UNSPECIFIED: ICD-10-CM

## 2018-07-24 DIAGNOSIS — N39.0 URINARY TRACT INFECTION, SITE NOT SPECIFIED: ICD-10-CM

## 2018-07-24 DIAGNOSIS — N40.0 BENIGN PROSTATIC HYPERPLASIA WITHOUT LOWER URINARY TRACT SYMPTOMS: ICD-10-CM

## 2018-07-24 DIAGNOSIS — K92.1 MELENA: ICD-10-CM

## 2018-07-24 DIAGNOSIS — Z29.9 ENCOUNTER FOR PROPHYLACTIC MEASURES, UNSPECIFIED: ICD-10-CM

## 2018-07-24 DIAGNOSIS — I48.0 PAROXYSMAL ATRIAL FIBRILLATION: ICD-10-CM

## 2018-07-24 DIAGNOSIS — D64.9 ANEMIA, UNSPECIFIED: ICD-10-CM

## 2018-07-24 DIAGNOSIS — I10 ESSENTIAL (PRIMARY) HYPERTENSION: ICD-10-CM

## 2018-07-24 DIAGNOSIS — E11.9 TYPE 2 DIABETES MELLITUS WITHOUT COMPLICATIONS: ICD-10-CM

## 2018-07-24 LAB
ALBUMIN SERPL ELPH-MCNC: 4 G/DL
ALBUMIN SERPL ELPH-MCNC: 4 G/DL — SIGNIFICANT CHANGE UP (ref 3.3–5)
ALP BLD-CCNC: 500 U/L
ALP SERPL-CCNC: 587 U/L — HIGH (ref 40–120)
ALT FLD-CCNC: 63 U/L — HIGH (ref 10–45)
ALT SERPL-CCNC: 40 U/L
ANION GAP SERPL CALC-SCNC: 15 MMOL/L — SIGNIFICANT CHANGE UP (ref 5–17)
ANION GAP SERPL CALC-SCNC: 18 MMOL/L
APPEARANCE UR: ABNORMAL
APPEARANCE: ABNORMAL
APTT BLD: 32.6 SEC — SIGNIFICANT CHANGE UP (ref 27.5–37.4)
AST SERPL-CCNC: 35 U/L
AST SERPL-CCNC: 64 U/L — HIGH (ref 10–40)
BASOPHILS # BLD AUTO: 0 K/UL — SIGNIFICANT CHANGE UP (ref 0–0.2)
BASOPHILS # BLD AUTO: 0 K/UL — SIGNIFICANT CHANGE UP (ref 0–0.2)
BASOPHILS # BLD AUTO: 0.02 K/UL
BASOPHILS NFR BLD AUTO: 0.1 % — SIGNIFICANT CHANGE UP (ref 0–2)
BASOPHILS NFR BLD AUTO: 0.1 % — SIGNIFICANT CHANGE UP (ref 0–2)
BASOPHILS NFR BLD AUTO: 0.2 %
BILIRUB SERPL-MCNC: 0.4 MG/DL
BILIRUB SERPL-MCNC: 0.5 MG/DL — SIGNIFICANT CHANGE UP (ref 0.2–1.2)
BILIRUB UR-MCNC: NEGATIVE — SIGNIFICANT CHANGE UP
BILIRUBIN URINE: NEGATIVE
BLD GP AB SCN SERPL QL: NEGATIVE — SIGNIFICANT CHANGE UP
BLOOD URINE: ABNORMAL
BUN SERPL-MCNC: 90 MG/DL
BUN SERPL-MCNC: 92 MG/DL — HIGH (ref 7–23)
CALCIUM SERPL-MCNC: 9.3 MG/DL
CALCIUM SERPL-MCNC: 9.5 MG/DL — SIGNIFICANT CHANGE UP (ref 8.4–10.5)
CHLORIDE SERPL-SCNC: 98 MMOL/L
CHLORIDE SERPL-SCNC: 98 MMOL/L — SIGNIFICANT CHANGE UP (ref 96–108)
CO2 SERPL-SCNC: 23 MMOL/L
CO2 SERPL-SCNC: 24 MMOL/L — SIGNIFICANT CHANGE UP (ref 22–31)
COLOR SPEC: YELLOW — SIGNIFICANT CHANGE UP
COLOR: YELLOW
CREAT SERPL-MCNC: 1.96 MG/DL
CREAT SERPL-MCNC: 2.05 MG/DL — HIGH (ref 0.5–1.3)
DIFF PNL FLD: ABNORMAL
EOSINOPHIL # BLD AUTO: 0.1 K/UL — SIGNIFICANT CHANGE UP (ref 0–0.5)
EOSINOPHIL # BLD AUTO: 0.16 K/UL
EOSINOPHIL # BLD AUTO: 0.2 K/UL — SIGNIFICANT CHANGE UP (ref 0–0.5)
EOSINOPHIL NFR BLD AUTO: 1.3 % — SIGNIFICANT CHANGE UP (ref 0–6)
EOSINOPHIL NFR BLD AUTO: 1.4 % — SIGNIFICANT CHANGE UP (ref 0–6)
EOSINOPHIL NFR BLD AUTO: 1.6 %
EPI CELLS # UR: SIGNIFICANT CHANGE UP /HPF
GLUCOSE QUALITATIVE U: NEGATIVE MG/DL
GLUCOSE SERPL-MCNC: 282 MG/DL — HIGH (ref 70–99)
GLUCOSE SERPL-MCNC: 395 MG/DL
GLUCOSE UR QL: NEGATIVE — SIGNIFICANT CHANGE UP
HCT VFR BLD CALC: 23.8 % — LOW (ref 39–50)
HCT VFR BLD CALC: 27.4 % — LOW (ref 39–50)
HCT VFR BLD CALC: 28.6 % — LOW (ref 39–50)
HCT VFR BLD CALC: 30.2 %
HGB BLD-MCNC: 7.8 G/DL — LOW (ref 13–17)
HGB BLD-MCNC: 9 G/DL — LOW (ref 13–17)
HGB BLD-MCNC: 9.3 G/DL
HGB BLD-MCNC: 9.4 G/DL — LOW (ref 13–17)
IMM GRANULOCYTES NFR BLD AUTO: 0.6 %
INR BLD: 1.16 RATIO — SIGNIFICANT CHANGE UP (ref 0.88–1.16)
KETONES UR-MCNC: NEGATIVE — SIGNIFICANT CHANGE UP
KETONES URINE: NEGATIVE
LEUKOCYTE ESTERASE UR-ACNC: ABNORMAL
LEUKOCYTE ESTERASE URINE: ABNORMAL
LYMPHOCYTES # BLD AUTO: 0.4 K/UL — LOW (ref 1–3.3)
LYMPHOCYTES # BLD AUTO: 0.4 K/UL — LOW (ref 1–3.3)
LYMPHOCYTES # BLD AUTO: 0.49 K/UL
LYMPHOCYTES # BLD AUTO: 3.4 % — LOW (ref 13–44)
LYMPHOCYTES # BLD AUTO: 4 % — LOW (ref 13–44)
LYMPHOCYTES NFR BLD AUTO: 5 %
MAN DIFF?: NORMAL
MCHC RBC-ENTMCNC: 26.1 PG
MCHC RBC-ENTMCNC: 26.8 PG — LOW (ref 27–34)
MCHC RBC-ENTMCNC: 26.8 PG — LOW (ref 27–34)
MCHC RBC-ENTMCNC: 27 PG — SIGNIFICANT CHANGE UP (ref 27–34)
MCHC RBC-ENTMCNC: 30.8 GM/DL
MCHC RBC-ENTMCNC: 32.7 GM/DL — SIGNIFICANT CHANGE UP (ref 32–36)
MCHC RBC-ENTMCNC: 33 GM/DL — SIGNIFICANT CHANGE UP (ref 32–36)
MCHC RBC-ENTMCNC: 33 GM/DL — SIGNIFICANT CHANGE UP (ref 32–36)
MCV RBC AUTO: 81.3 FL — SIGNIFICANT CHANGE UP (ref 80–100)
MCV RBC AUTO: 81.8 FL — SIGNIFICANT CHANGE UP (ref 80–100)
MCV RBC AUTO: 81.9 FL — SIGNIFICANT CHANGE UP (ref 80–100)
MCV RBC AUTO: 84.8 FL
MONOCYTES # BLD AUTO: 0.7 K/UL — SIGNIFICANT CHANGE UP (ref 0–0.9)
MONOCYTES # BLD AUTO: 0.83 K/UL
MONOCYTES # BLD AUTO: 0.9 K/UL — SIGNIFICANT CHANGE UP (ref 0–0.9)
MONOCYTES NFR BLD AUTO: 6.4 % — SIGNIFICANT CHANGE UP (ref 2–14)
MONOCYTES NFR BLD AUTO: 7.4 % — SIGNIFICANT CHANGE UP (ref 2–14)
MONOCYTES NFR BLD AUTO: 8.5 %
NEUTROPHILS # BLD AUTO: 11.1 K/UL — HIGH (ref 1.8–7.4)
NEUTROPHILS # BLD AUTO: 8.17 K/UL
NEUTROPHILS # BLD AUTO: 9 K/UL — HIGH (ref 1.8–7.4)
NEUTROPHILS NFR BLD AUTO: 84.1 %
NEUTROPHILS NFR BLD AUTO: 87.8 % — HIGH (ref 43–77)
NEUTROPHILS NFR BLD AUTO: 88.1 % — HIGH (ref 43–77)
NITRITE UR-MCNC: NEGATIVE — SIGNIFICANT CHANGE UP
NITRITE URINE: NEGATIVE
PH UR: 8 — SIGNIFICANT CHANGE UP (ref 5–8)
PH URINE: >8.5
PLATELET # BLD AUTO: 112 K/UL — LOW (ref 150–400)
PLATELET # BLD AUTO: 136 K/UL — LOW (ref 150–400)
PLATELET # BLD AUTO: 140 K/UL — LOW (ref 150–400)
PLATELET # BLD AUTO: 163 K/UL
POTASSIUM SERPL-MCNC: 4.4 MMOL/L — SIGNIFICANT CHANGE UP (ref 3.5–5.3)
POTASSIUM SERPL-SCNC: 4.4 MMOL/L — SIGNIFICANT CHANGE UP (ref 3.5–5.3)
POTASSIUM SERPL-SCNC: 4.5 MMOL/L
PROT SERPL-MCNC: 7.7 G/DL
PROT SERPL-MCNC: 7.7 G/DL — SIGNIFICANT CHANGE UP (ref 6–8.3)
PROT UR-MCNC: 30 MG/DL
PROTEIN URINE: 100 MG/DL
PROTHROM AB SERPL-ACNC: 12.7 SEC — SIGNIFICANT CHANGE UP (ref 9.8–12.7)
RBC # BLD: 2.91 M/UL — LOW (ref 4.2–5.8)
RBC # BLD: 3.35 M/UL — LOW (ref 4.2–5.8)
RBC # BLD: 3.52 M/UL — LOW (ref 4.2–5.8)
RBC # BLD: 3.56 M/UL
RBC # FLD: 14.6 % — HIGH (ref 10.3–14.5)
RBC # FLD: 14.7 % — HIGH (ref 10.3–14.5)
RBC # FLD: 14.7 % — HIGH (ref 10.3–14.5)
RBC # FLD: 16.2 %
RBC CASTS # UR COMP ASSIST: SIGNIFICANT CHANGE UP /HPF (ref 0–2)
RH IG SCN BLD-IMP: POSITIVE — SIGNIFICANT CHANGE UP
SODIUM SERPL-SCNC: 137 MMOL/L — SIGNIFICANT CHANGE UP (ref 135–145)
SODIUM SERPL-SCNC: 139 MMOL/L
SP GR SPEC: 1.01 — SIGNIFICANT CHANGE UP (ref 1.01–1.02)
SPECIFIC GRAVITY URINE: 1.01
UROBILINOGEN FLD QL: NEGATIVE — SIGNIFICANT CHANGE UP
UROBILINOGEN URINE: NEGATIVE MG/DL
WBC # BLD: 10.2 K/UL — SIGNIFICANT CHANGE UP (ref 3.8–10.5)
WBC # BLD: 12.7 K/UL — HIGH (ref 3.8–10.5)
WBC # BLD: 7.4 K/UL — SIGNIFICANT CHANGE UP (ref 3.8–10.5)
WBC # FLD AUTO: 10.2 K/UL — SIGNIFICANT CHANGE UP (ref 3.8–10.5)
WBC # FLD AUTO: 12.7 K/UL — HIGH (ref 3.8–10.5)
WBC # FLD AUTO: 7.4 K/UL — SIGNIFICANT CHANGE UP (ref 3.8–10.5)
WBC # FLD AUTO: 9.73 K/UL
WBC UR QL: >50 /HPF (ref 0–5)

## 2018-07-24 PROCEDURE — 71046 X-RAY EXAM CHEST 2 VIEWS: CPT | Mod: 26

## 2018-07-24 PROCEDURE — 93010 ELECTROCARDIOGRAM REPORT: CPT

## 2018-07-24 PROCEDURE — 99285 EMERGENCY DEPT VISIT HI MDM: CPT | Mod: 25

## 2018-07-24 PROCEDURE — 99223 1ST HOSP IP/OBS HIGH 75: CPT | Mod: AI,GC

## 2018-07-24 RX ORDER — INSULIN LISPRO 100/ML
VIAL (ML) SUBCUTANEOUS
Qty: 0 | Refills: 0 | Status: DISCONTINUED | OUTPATIENT
Start: 2018-07-24 | End: 2018-07-26

## 2018-07-24 RX ORDER — DEXTROSE 50 % IN WATER 50 %
25 SYRINGE (ML) INTRAVENOUS ONCE
Qty: 0 | Refills: 0 | Status: DISCONTINUED | OUTPATIENT
Start: 2018-07-24 | End: 2018-07-26

## 2018-07-24 RX ORDER — SODIUM CHLORIDE 9 MG/ML
1000 INJECTION, SOLUTION INTRAVENOUS
Qty: 0 | Refills: 0 | Status: DISCONTINUED | OUTPATIENT
Start: 2018-07-24 | End: 2018-07-26

## 2018-07-24 RX ORDER — CEFTRIAXONE 500 MG/1
1 INJECTION, POWDER, FOR SOLUTION INTRAMUSCULAR; INTRAVENOUS EVERY 24 HOURS
Qty: 0 | Refills: 0 | Status: DISCONTINUED | OUTPATIENT
Start: 2018-07-24 | End: 2018-07-26

## 2018-07-24 RX ORDER — DEXTROSE 50 % IN WATER 50 %
12.5 SYRINGE (ML) INTRAVENOUS ONCE
Qty: 0 | Refills: 0 | Status: DISCONTINUED | OUTPATIENT
Start: 2018-07-24 | End: 2018-07-26

## 2018-07-24 RX ORDER — OCTREOTIDE ACETATE 200 UG/ML
50 INJECTION, SOLUTION INTRAVENOUS; SUBCUTANEOUS
Qty: 0 | Refills: 0 | Status: DISCONTINUED | OUTPATIENT
Start: 2018-07-24 | End: 2018-07-24

## 2018-07-24 RX ORDER — PANTOPRAZOLE SODIUM 20 MG/1
40 TABLET, DELAYED RELEASE ORAL
Qty: 0 | Refills: 0 | Status: DISCONTINUED | OUTPATIENT
Start: 2018-07-24 | End: 2018-07-24

## 2018-07-24 RX ORDER — DEXTROSE 50 % IN WATER 50 %
15 SYRINGE (ML) INTRAVENOUS ONCE
Qty: 0 | Refills: 0 | Status: DISCONTINUED | OUTPATIENT
Start: 2018-07-24 | End: 2018-07-26

## 2018-07-24 RX ORDER — CARVEDILOL PHOSPHATE 80 MG/1
1 CAPSULE, EXTENDED RELEASE ORAL
Qty: 0 | Refills: 0 | COMMUNITY

## 2018-07-24 RX ORDER — SODIUM CHLORIDE 9 MG/ML
1000 INJECTION INTRAMUSCULAR; INTRAVENOUS; SUBCUTANEOUS
Qty: 0 | Refills: 0 | Status: COMPLETED | OUTPATIENT
Start: 2018-07-24 | End: 2018-07-25

## 2018-07-24 RX ORDER — SODIUM CHLORIDE 9 MG/ML
500 INJECTION INTRAMUSCULAR; INTRAVENOUS; SUBCUTANEOUS ONCE
Qty: 0 | Refills: 0 | Status: COMPLETED | OUTPATIENT
Start: 2018-07-24 | End: 2018-07-24

## 2018-07-24 RX ORDER — CARVEDILOL PHOSPHATE 80 MG/1
3.12 CAPSULE, EXTENDED RELEASE ORAL EVERY 12 HOURS
Qty: 0 | Refills: 0 | Status: DISCONTINUED | OUTPATIENT
Start: 2018-07-24 | End: 2018-07-26

## 2018-07-24 RX ORDER — ASPIRIN/CALCIUM CARB/MAGNESIUM 324 MG
81 TABLET ORAL DAILY
Qty: 0 | Refills: 0 | Status: DISCONTINUED | OUTPATIENT
Start: 2018-07-24 | End: 2018-07-26

## 2018-07-24 RX ORDER — PANTOPRAZOLE SODIUM 20 MG/1
40 TABLET, DELAYED RELEASE ORAL EVERY 12 HOURS
Qty: 0 | Refills: 0 | Status: DISCONTINUED | OUTPATIENT
Start: 2018-07-24 | End: 2018-07-26

## 2018-07-24 RX ORDER — ATORVASTATIN CALCIUM 80 MG/1
20 TABLET, FILM COATED ORAL AT BEDTIME
Qty: 0 | Refills: 0 | Status: DISCONTINUED | OUTPATIENT
Start: 2018-07-24 | End: 2018-07-26

## 2018-07-24 RX ORDER — TAMSULOSIN HYDROCHLORIDE 0.4 MG/1
0.4 CAPSULE ORAL AT BEDTIME
Qty: 0 | Refills: 0 | Status: DISCONTINUED | OUTPATIENT
Start: 2018-07-24 | End: 2018-07-26

## 2018-07-24 RX ORDER — CEFTRIAXONE 500 MG/1
1 INJECTION, POWDER, FOR SOLUTION INTRAMUSCULAR; INTRAVENOUS ONCE
Qty: 0 | Refills: 0 | Status: COMPLETED | OUTPATIENT
Start: 2018-07-24 | End: 2018-07-24

## 2018-07-24 RX ORDER — CLOPIDOGREL BISULFATE 75 MG/1
75 TABLET, FILM COATED ORAL DAILY
Qty: 0 | Refills: 0 | Status: DISCONTINUED | OUTPATIENT
Start: 2018-07-24 | End: 2018-07-26

## 2018-07-24 RX ORDER — PANTOPRAZOLE SODIUM 20 MG/1
40 TABLET, DELAYED RELEASE ORAL ONCE
Qty: 0 | Refills: 0 | Status: COMPLETED | OUTPATIENT
Start: 2018-07-24 | End: 2018-07-24

## 2018-07-24 RX ORDER — GLUCAGON INJECTION, SOLUTION 0.5 MG/.1ML
1 INJECTION, SOLUTION SUBCUTANEOUS ONCE
Qty: 0 | Refills: 0 | Status: DISCONTINUED | OUTPATIENT
Start: 2018-07-24 | End: 2018-07-26

## 2018-07-24 RX ORDER — OCTREOTIDE ACETATE 200 UG/ML
50 INJECTION, SOLUTION INTRAVENOUS; SUBCUTANEOUS
Qty: 0 | Refills: 0 | Status: DISCONTINUED | OUTPATIENT
Start: 2018-07-24 | End: 2018-07-26

## 2018-07-24 RX ORDER — DILTIAZEM HCL 120 MG
1 CAPSULE, EXT RELEASE 24 HR ORAL
Qty: 0 | Refills: 0 | COMMUNITY

## 2018-07-24 RX ORDER — FINASTERIDE 5 MG/1
5 TABLET, FILM COATED ORAL DAILY
Qty: 0 | Refills: 0 | Status: DISCONTINUED | OUTPATIENT
Start: 2018-07-24 | End: 2018-07-26

## 2018-07-24 RX ADMIN — FINASTERIDE 5 MILLIGRAM(S): 5 TABLET, FILM COATED ORAL at 22:14

## 2018-07-24 RX ADMIN — PANTOPRAZOLE SODIUM 40 MILLIGRAM(S): 20 TABLET, DELAYED RELEASE ORAL at 22:15

## 2018-07-24 RX ADMIN — SODIUM CHLORIDE 500 MILLILITER(S): 9 INJECTION INTRAMUSCULAR; INTRAVENOUS; SUBCUTANEOUS at 12:10

## 2018-07-24 RX ADMIN — CLOPIDOGREL BISULFATE 75 MILLIGRAM(S): 75 TABLET, FILM COATED ORAL at 17:03

## 2018-07-24 RX ADMIN — CARVEDILOL PHOSPHATE 3.12 MILLIGRAM(S): 80 CAPSULE, EXTENDED RELEASE ORAL at 22:14

## 2018-07-24 RX ADMIN — SODIUM CHLORIDE 500 MILLILITER(S): 9 INJECTION INTRAMUSCULAR; INTRAVENOUS; SUBCUTANEOUS at 11:41

## 2018-07-24 RX ADMIN — SODIUM CHLORIDE 75 MILLILITER(S): 9 INJECTION INTRAMUSCULAR; INTRAVENOUS; SUBCUTANEOUS at 17:04

## 2018-07-24 RX ADMIN — PANTOPRAZOLE SODIUM 40 MILLIGRAM(S): 20 TABLET, DELAYED RELEASE ORAL at 10:33

## 2018-07-24 RX ADMIN — CEFTRIAXONE 100 GRAM(S): 500 INJECTION, POWDER, FOR SOLUTION INTRAMUSCULAR; INTRAVENOUS at 11:40

## 2018-07-24 RX ADMIN — Medication 81 MILLIGRAM(S): at 17:03

## 2018-07-24 RX ADMIN — ATORVASTATIN CALCIUM 20 MILLIGRAM(S): 80 TABLET, FILM COATED ORAL at 22:14

## 2018-07-24 RX ADMIN — TAMSULOSIN HYDROCHLORIDE 0.4 MILLIGRAM(S): 0.4 CAPSULE ORAL at 22:18

## 2018-07-24 RX ADMIN — SODIUM CHLORIDE 75 MILLILITER(S): 9 INJECTION INTRAMUSCULAR; INTRAVENOUS; SUBCUTANEOUS at 22:18

## 2018-07-24 RX ADMIN — CEFTRIAXONE 1 GRAM(S): 500 INJECTION, POWDER, FOR SOLUTION INTRAMUSCULAR; INTRAVENOUS at 12:40

## 2018-07-24 RX ADMIN — OCTREOTIDE ACETATE 50 MICROGRAM(S): 200 INJECTION, SOLUTION INTRAVENOUS; SUBCUTANEOUS at 11:40

## 2018-07-24 RX ADMIN — Medication 4: at 18:02

## 2018-07-24 NOTE — CONSULT NOTE ADULT - ATTENDING COMMENTS
Patient interviewed and examined.  Chart reviewed and note edited where appropriate.  Case discussed with fellow.  Agree w/ Assessment and Plan as outlined.    Sander Gonsalez MD [facility]  Spectra:  [phone]  Office: [phone]

## 2018-07-24 NOTE — H&P ADULT - HISTORY OF PRESENT ILLNESS
Mr Bryant is a 84 yo man with hx afib (s/p Watchman last week), multiple GI bleeds requiring multiple transfusions (11 last year),  CAD s/p two stents 2015, DM2 (on oral hyperglycemic medications) presents with melena.  He has had a long history of GI bleeds requiring many transfusions. He has had long work-up for the bleeds including a recent VCE that showed bleeding in the jejunum that his gastroenterologist did not think should be surgically managed (instead was placed on a trial of octreotide). Long discussions were had between his cardiologist and his GI doctor about the need for anticoagulation for his A-fib and the need to hold anticoagulation for his GI bleeds.  It was ultimately decided that he get a Watchman procedure and take him off anticoagulation.  He did get put on aspirin/plavix for post-procedure prophylaxis however.  This procedure happened 8 days ago and 3 days ago he started developing melena.  He reached out to his primary Dr. Lane who measured his hemoglobin yesterday and did an FOBT.  The hemoglobin was found to have dropped from around 10 to 7.2 and the FOBT was positive, so Dr. Lane sent Mr Bryant to the ER today.  In the ER Mr Bryant was comfortable.  He did report burning with urination and increased urinary frequency over at least the past week.  He did not have fevers, chills.  He did not have associated nausea or vomiting.  The only change he's noticed in his stool lately aside from the melena is some increased stool frequency. Mr Bryant is a 82 yo man with hx afib (s/p Watchman last week), multiple GI bleeds requiring multiple transfusions (11 last year),  CAD s/p two stents 2015, DM2 (on oral hyperglycemic medications) presents with melena.  He has had a long history of GI bleeds requiring many transfusions. He has had long work-up for the bleeds including a recent VCE that showed bleeding in the jejunum that his gastroenterologist did not think should be surgically managed (instead was placed on a trial of octreotide). Long discussions were had between his cardiologist and his GI doctor about the need for anticoagulation for his A-fib and the need to hold anticoagulation for his GI bleeds.  It was ultimately decided that he get a Watchman procedure and take him off anticoagulation.  He did get put on aspirin/plavix for post-procedure prophylaxis. This procedure happened 8 days ago and 3 days ago he started developing melena.  He reached out to his primary Dr. Lane who measured his hemoglobin yesterday and did an FOBT.  The hemoglobin was found to have dropped from around 10 to 7.2 and the FOBT was positive, so Dr. Lane sent Mr Bryant to the ER today.  In the ER Mr Bryant was comfortable.  He did report burning with urination and increased urinary frequency over at least the past week.  He did not have fevers, chills.  He did not have associated nausea or vomiting.  The only change he's noticed in his stool lately aside from the melena is some increased stool frequency.

## 2018-07-24 NOTE — H&P ADULT - PROBLEM SELECTOR PLAN 7
On antiplatelet therapy, will hold prophy AC in setting of melena  DASH/Consistent carb diet Home atorvastatin 10mg Home flomax

## 2018-07-24 NOTE — H&P ADULT - NSHPLABSRESULTS_GEN_ALL_CORE
137  |  98  |  92<H>  ----------------------------<  282<H>  4.4   |  24  |  2.05<H>    Ca    9.5      2018 10:31    TPro  7.7  /  Alb  4.0  /  TBili  0.5  /  DBili  x   /  AST  64<H>  /  ALT  63<H>  /  AlkPhos  587<H>            PT/INR - ( 2018 10:31 )   PT: 12.7 sec;   INR: 1.16 ratio         PTT - ( 2018 10:31 )  PTT:32.6 sec              Urinalysis Basic - ( 2018 10:31 )    Color: Yellow / Appearance: SL Turbid / S.015 / pH: x  Gluc: x / Ketone: Negative  / Bili: Negative / Urobili: Negative   Blood: x / Protein: 30 mg/dL / Nitrite: Negative   Leuk Esterase: Large / RBC: 3-5 /HPF / WBC >50 /HPF   Sq Epi: x / Non Sq Epi: OCC /HPF / Bacteria: x                              9.4    12.7  )-----------( 136      ( 2018 10:31 )             28.6     CAPILLARY BLOOD GLUCOSE

## 2018-07-24 NOTE — H&P ADULT - PROBLEM SELECTOR PLAN 5
On home glipizide, januvia  -Will check A1c  -ISS Cr 2.05 today, had baseline of 1.6-1.7 last year  -No clear signs of volume depletion, so will hold off on giving fluids  -Will continue to monitor -Cr 2.05 today, had baseline of 1.6-1.7 last year  -No clear signs of volume depletion, so will hold off on giving fluids  -Will continue to monitor -Cr 2.05 today, had baseline of 1.6-1.7 last year  -Will give 1L (75ml/hr) today  -Will continue to monitor

## 2018-07-24 NOTE — ED ADULT NURSE NOTE - CHPI ED SYMPTOMS NEG
no fever/no nausea/no pain/no chills/no tingling/no numbness/no dizziness/no vomiting/no weakness/no decreased eating/drinking

## 2018-07-24 NOTE — H&P ADULT - PROBLEM SELECTOR PROBLEM 6
Need for prophylactic measure HLD (hyperlipidemia) BPH (benign prostatic hyperplasia) DM (diabetes mellitus)

## 2018-07-24 NOTE — H&P ADULT - NSHPPHYSICALEXAM_GEN_ALL_CORE
PHYSICAL EXAM:  GENERAL: NAD, well-developed  HEAD:  Atraumatic, Normocephalic  EYES: EOMI, PERRLA, conjunctiva and sclera clear  NECK: Supple, No JVD  CHEST/LUNG: Clear to auscultation bilaterally; No wheeze  HEART: regular rate and rhythm; No murmurs, rubs, or gallops  ABDOMEN: Soft, Nontender, Nondistended; Bowel sounds present  EXTREMITIES:  2+ Peripheral Pulses, No clubbing, cyanosis, or edema  PSYCH: AAOx3  NEUROLOGY: non-focal  SKIN: venous stasis changes in legs, 4x4 cm bruises on arms near IV sites

## 2018-07-24 NOTE — H&P ADULT - PROBLEM SELECTOR PLAN 2
Urinalysis positive, will check culture  -Start ceftriaxone 1g Qday Urinalysis positive, will check culture + dysuria  -Start ceftriaxone 1g Qday

## 2018-07-24 NOTE — ED PROVIDER NOTE - OBJECTIVE STATEMENT
80 y/o male with PMH of HTN, HLD, DM type2, controlled with oral hypoglycemic meds, A1c 6.4, MI (1970), CAD with 2 stent placement in June 2015, pAfib (no AC 2/2 GIB), Lymphedema, Iron deficiency anemia, 84 y/o male with PMH of HTN, HLD, DM type2, controlled with oral hypoglycemic meds, A1c 6.4, MI (1970), CAD with 2 stent placement in June 2015, pAfib (no AC 2/2 GIB), Lymphedema, Iron deficiency anemia, 84 y/o male with PMH of HTN, HLD, DM type2, controlled with oral hypoglycemic meds, A1c 6.4, MI (1970), CAD with 2 stent placement in June 2015, pAfib (no AC 2/2 GIB), Lymphedema, Iron deficiency anemia, recently underwent Watchman Procedure at [place] 8 days ago placed ASA/Plavix c/b urinary retention and hematuria requiring baig presented to the ED c/o melena and low hemoglobin. Patient stated melena started 3 days ago and was evaluated by PMD Dr. Lane yesterday who stated patient has +Guaiac exam and recommended pt to present to ED as pt hemoglobin 7.4 (baseline>10). Patient has h/o GIB requiring blood transfusions and is on octreotide every other day. Patient stated he occasionally experiences PARRISH. Patient denied CP, SOB, headache, palpitations, dizziness, weakness, abdominal pain, N/V/D, fever chills, dysuria

## 2018-07-24 NOTE — ED ADULT NURSE NOTE - OBJECTIVE STATEMENT
Pt is an 84 y/o M who came to the ED amb c/o  melena x 3 days and low hemoglobin of 7.4. States he occasionally has SOB, denies cp/palpitations, dizziness, weakness, abdominal pain, N/V/D, fever chills, dysuria. states he saw his pmd yesterdays and was guaiac +.A/O x3. States hx of GIB requiring transfusions. Pt is an 82 y/o M who came to the ED amb c/o  melena x 3 days and low hemoglobin of 7.4. States he occasionally has SOB, denies cp/palpitations, dizziness, weakness, abdominal pain, N/V/D, fever chills, dysuria. states he saw his pmd yesterdays and was guaiac +. States he has lightheadedness for over a year, worse over the last few days. A/O x3. States hx of GIB requiring transfusions.

## 2018-07-24 NOTE — H&P ADULT - PROBLEM SELECTOR PLAN 1
Initial CBC was 9.4, will repeat.  Very extensive history GI bleeding with evidence of jejunal bleeding on VCE.  Currently on aspirin and plavix for his Watchman which should not be stopped to prevent post-op thrombosis.  -Will continue to monitor HgB  -Type and screen done in ED  -Will reach out to cardiology about aspirin/plavix if bleeding becomes severe  -Hemodynamically stable with extensive scopes in past so will not recommend one now  -Will continue his outpatient octreotide for bleeding prevention. Initial CBC was 9.4, will repeat.  Very extensive history GI bleeding with evidence of jejunal bleeding on VCE.  Currently on aspirin and plavix for his Watchman which should not be stopped to prevent post-op thrombosis.  -Will continue to monitor Hgb q8hr  -Type and screen done in ED  -Will reach out to cardiology about aspirin/plavix if bleeding becomes severe  -Hemodynamically stable, monitor vital signs q4hr  -GI consult  -Will continue his outpatient octreotide for bleeding prevention. Initial CBC was 9.4, will repeat.  Very extensive history GI bleeding with evidence of jejunal bleeding on VCE.  Currently on aspirin and plavix for his Watchman which should not be stopped to prevent post-op thrombosis.  -Will continue to monitor Hgb q8hr  -Type and screen done in ED  -Will consult cardiology regarding aspirin/plavix for Watchman  -Hemodynamically stable, monitor vital signs q4hr  -GI consult  -Will continue his outpatient octreotide for bleeding prevention. Initial CBC was 9.4, will repeat.  Very extensive history GI bleeding with evidence of jejunal bleeding on VCE 2/2 to angioectasias  Currently on aspirin and plavix for his Watchman which should not be stopped to prevent post-op thrombosis.  -Will continue to monitor Hgb q8hr  -Type and screen done in ED  -Will consult cardiology regarding aspirin/plavix for Watchman  -Hemodynamically stable, monitor vital signs q4hr  -GI consult  -Will continue his outpatient octreotide for bleeding prevention.

## 2018-07-24 NOTE — CONSULT NOTE ADULT - SUBJECTIVE AND OBJECTIVE BOX
Patient seen and evaluated at bedside    HPI:  Mr Bryant is a 82 yo man with hx afib (s/p Watchman last week), multiple GI bleeds requiring multiple transfusions (11 last year),  CAD s/p two stents , DM2 (on oral hyperglycemic medications) presents with melena.  He has had a long history of GI bleeds requiring many transfusions. He has had long work-up for the bleeds including a recent VCE that showed bleeding in the jejunum that his gastroenterologist did not think should be surgically managed (instead was placed on a trial of octreotide). Long discussions were had between his cardiologist and his GI doctor about the need for anticoagulation for his A-fib and the need to hold anticoagulation for his GI bleeds.  It was ultimately decided that he get a Watchman procedure and take him off anticoagulation.  He did get put on aspirin/plavix for post-procedure prophylaxis. This procedure happened 8 days ago and 3 days ago he started developing melena.  He reached out to his primary Dr. Lane who measured his hemoglobin yesterday and did an FOBT.  The hemoglobin was found to have dropped from around 10 to 7.2 and the FOBT was positive, so Dr. Lane sent Mr Bryant to the ER today.  In the ER Mr Bryant was comfortable.  He did report burning with urination and increased urinary frequency over at least the past week.  He did not have fevers, chills.  He did not have associated nausea or vomiting.  The only change he's noticed in his stool lately aside from the melena is some increased stool frequency. (2018 13:03)      PMH:   Coronary artery disease due to calcified coronary lesion  Stented coronary artery  Anemia  Carotid stenosis  DM (diabetes mellitus)  PAF (paroxysmal atrial fibrillation)  HLD (hyperlipidemia)  HTN (hypertension)  MI (myocardial infarction)  Former smoker      PSH:   History of tonsillectomy  H/O carotid endarterectomy      Medications:   aspirin enteric coated 81 milliGRAM(s) Oral daily  atorvastatin 20 milliGRAM(s) Oral at bedtime  carvedilol 3.125 milliGRAM(s) Oral every 12 hours  cefTRIAXone   IVPB 1 Gram(s) IV Intermittent every 24 hours  clopidogrel Tablet 75 milliGRAM(s) Oral daily  dextrose 40% Gel 15 Gram(s) Oral once PRN  dextrose 5%. 1000 milliLiter(s) IV Continuous <Continuous>  dextrose 50% Injectable 12.5 Gram(s) IV Push once  dextrose 50% Injectable 25 Gram(s) IV Push once  dextrose 50% Injectable 25 Gram(s) IV Push once  diltiazem    Tablet 45 milliGRAM(s) Oral every 6 hours  finasteride 5 milliGRAM(s) Oral daily  glucagon  Injectable 1 milliGRAM(s) IntraMuscular once PRN  insulin lispro (HumaLOG) corrective regimen sliding scale   SubCutaneous three times a day before meals  octreotide  Injectable 50 MICROGram(s) SubCutaneous two times a day  pantoprazole  Injectable 40 milliGRAM(s) IV Push every 12 hours  sodium chloride 0.9%. 1000 milliLiter(s) IV Continuous <Continuous>  tamsulosin 0.4 milliGRAM(s) Oral at bedtime      Allergies:  Levaquin (Other)  penicillin (Other)      FAMILY HISTORY:  Family history of heart disease (Father): father  of MI age 59      Social History:  Smoking History:  Alcohol Use:  Drug Use:    Review of Systems:  REVIEW OF SYSTEMS:  CONSTITUTIONAL: No weakness, fevers or chills  EYES/ENT: No visual changes;  No dysphagia  NECK: No pain or stiffness  RESPIRATORY: No cough, wheezing, hemoptysis; No shortness of breath  CARDIOVASCULAR: No chest pain or palpitations; No lower extremity edema  GASTROINTESTINAL: No abdominal or epigastric pain. No nausea, vomiting, or hematemesis; No diarrhea or constipation. No melena or hematochezia.  BACK: No back pain  GENITOURINARY: No dysuria, frequency or hematuria  NEUROLOGICAL: No numbness or weakness  SKIN: No itching, burning, rashes, or lesions   All other review of systems is negative unless indicated above.    Physical Exam:  T(F): 97.7 (-), Max: 98.1 ()  HR: 85 () (70 - 120)  BP: 110/79 () (110/79 - 146/72)  RR: 16 (-)  SpO2: 97% ()  GENERAL: No acute distress, well-developed  HEAD:  Atraumatic, Normocephalic  ENT: EOMI, PERRLA, conjunctiva and sclera clear, Neck supple, No JVD, moist mucosa  CHEST/LUNG: Clear to auscultation bilaterally; No wheeze, equal breath sounds bilaterally   BACK: No spinal tenderness  HEART: Regular rate and rhythm; No murmurs, rubs, or gallops  ABDOMEN: Soft, Nontender, Nondistended; Bowel sounds present  EXTREMITIES:  No clubbing, cyanosis, or edema  PSYCH: Nl behavior, nl affect  NEUROLOGY: AAOx3, non-focal, cranial nerves intact  SKIN: Normal color, No rashes or lesions  LINES:    Cardiovascular Diagnostic Testing:    ECG: Personally reviewed    Echo:    Stress Testing:    Cath:    Interpretation of Telemetry:    Imaging:    Labs: Personally reviewed                        9.0    10.2  )-----------( 140      ( 2018 15:19 )             27.4         137  |  98  |  92<H>  ----------------------------<  282<H>  4.4   |  24  |  2.05<H>    Ca    9.5      2018 10:31    TPro  7.7  /  Alb  4.0  /  TBili  0.5  /  DBili  x   /  AST  64<H>  /  ALT  63<H>  /  AlkPhos  587<H>      PT/INR - ( 2018 10:31 )   PT: 12.7 sec;   INR: 1.16 ratio         PTT - ( 2018 10:31 )  PTT:32.6 sec Patient seen and evaluated at bedside    HPI:  Mr Bryant is a 82 yo man with hx afib (s/p Watchman last week), multiple GI bleeds requiring multiple transfusions (11 last year),  CAD s/p two stents , DM2 (on oral hyperglycemic medications) presents with melena.  He has had a long history of GI bleeds requiring many transfusions. He has had long work-up for the bleeds including a recent VCE that showed bleeding in the jejunum that his gastroenterologist did not think should be surgically managed (instead was placed on a trial of octreotide). Long discussions were had between his cardiologist and his GI doctor about the need for anticoagulation for his A-fib and the need to hold anticoagulation for his GI bleeds.  It was ultimately decided that he get a Watchman procedure and take him off anticoagulation.  He did get put on aspirin/plavix for post-procedure prophylaxis. This procedure happened 8 days ago and 3 days ago he started developing melena.  He reached out to his primary Dr. Lane who measured his hemoglobin yesterday and did an FOBT.  The hemoglobin was found to have dropped from around 10 to 7.2 and the FOBT was positive, so Dr. Lane sent Mr Bryant to the ER today.  In the ER Mr Bryant was comfortable.  He did report burning with urination and increased urinary frequency over at least the past week.  He did not have fevers, chills.  He did not have associated nausea or vomiting.  The only change he's noticed in his stool lately aside from the melena is some increased stool frequency. (2018 13:03)      PMH:   Coronary artery disease due to calcified coronary lesion  Stented coronary artery  Anemia  Carotid stenosis  DM (diabetes mellitus)  PAF (paroxysmal atrial fibrillation)  HLD (hyperlipidemia)  HTN (hypertension)  MI (myocardial infarction)  Former smoker      PSH:   History of tonsillectomy  H/O carotid endarterectomy      Medications:   aspirin enteric coated 81 milliGRAM(s) Oral daily  atorvastatin 20 milliGRAM(s) Oral at bedtime  carvedilol 3.125 milliGRAM(s) Oral every 12 hours  cefTRIAXone   IVPB 1 Gram(s) IV Intermittent every 24 hours  clopidogrel Tablet 75 milliGRAM(s) Oral daily  dextrose 40% Gel 15 Gram(s) Oral once PRN  dextrose 5%. 1000 milliLiter(s) IV Continuous <Continuous>  dextrose 50% Injectable 12.5 Gram(s) IV Push once  dextrose 50% Injectable 25 Gram(s) IV Push once  dextrose 50% Injectable 25 Gram(s) IV Push once  diltiazem    Tablet 45 milliGRAM(s) Oral every 6 hours  finasteride 5 milliGRAM(s) Oral daily  glucagon  Injectable 1 milliGRAM(s) IntraMuscular once PRN  insulin lispro (HumaLOG) corrective regimen sliding scale   SubCutaneous three times a day before meals  octreotide  Injectable 50 MICROGram(s) SubCutaneous two times a day  pantoprazole  Injectable 40 milliGRAM(s) IV Push every 12 hours  sodium chloride 0.9%. 1000 milliLiter(s) IV Continuous <Continuous>  tamsulosin 0.4 milliGRAM(s) Oral at bedtime      Allergies:  Levaquin (Other)  penicillin (Other)      FAMILY HISTORY:  Family history of heart disease (Father): father  of MI age 59      Social History:  Smoking History:  Alcohol Use:  Drug Use:    Review of Systems:  REVIEW OF SYSTEMS:  CONSTITUTIONAL: No weakness, fevers or chills  EYES/ENT: No visual changes;  No dysphagia  NECK: No pain or stiffness  RESPIRATORY: No cough, wheezing, hemoptysis; No shortness of breath  CARDIOVASCULAR: No chest pain or palpitations; No lower extremity edema  GASTROINTESTINAL: No abdominal or epigastric pain. No nausea, vomiting, or hematemesis; No diarrhea or constipation. No melena or hematochezia.  BACK: No back pain  GENITOURINARY: No dysuria, frequency or hematuria  NEUROLOGICAL: No numbness or weakness  SKIN: No itching, burning, rashes, or lesions   All other review of systems is negative unless indicated above.    Physical Exam:  T(F): 97.7 (-), Max: 98.1 ()  HR: 85 () (70 - 120)  BP: 110/79 () (110/79 - 146/72)  RR: 16 (-)  SpO2: 97% ()  GENERAL: No acute distress, well-developed  HEAD:  Atraumatic, Normocephalic  ENT: EOMI, PERRLA, conjunctiva and sclera clear, Neck supple, No JVD, moist mucosa  CHEST/LUNG: Clear to auscultation bilaterally; No wheeze, equal breath sounds bilaterally   BACK: No spinal tenderness  HEART: Regular rate and rhythm; No murmurs, rubs, or gallops  ABDOMEN: Soft, Nontender, Nondistended; Bowel sounds present  EXTREMITIES:  No clubbing, cyanosis, or edema  PSYCH: Nl behavior, nl affect  NEUROLOGY: AAOx3, non-focal, cranial nerves intact  SKIN: Normal color, No rashes or lesions  LINES:    Cardiovascular Diagnostic Testing:    ECG: Personally reviewed    Labs: Personally reviewed                        9.0    10.2  )-----------( 140      ( 2018 15:19 )             27.4         137  |  98  |  92<H>  ----------------------------<  282<H>  4.4   |  24  |  2.05<H>    Ca    9.5      2018 10:31    TPro  7.7  /  Alb  4.0  /  TBili  0.5  /  DBili  x   /  AST  64<H>  /  ALT  63<H>  /  AlkPhos  587<H>      PT/INR - ( 2018 10:31 )   PT: 12.7 sec;   INR: 1.16 ratio         PTT - ( 2018 10:31 )  PTT:32.6 sec

## 2018-07-24 NOTE — ED CLERICAL - NS ED CLERK NOTE PRE-ARRIVAL INFORMATION; ADDITIONAL PRE-ARRIVAL INFORMATION
Long standing GI Bleed. Chronic  Afib. crit 21 needs blood.  Please call Dr Lane for more important info

## 2018-07-24 NOTE — ED PROVIDER NOTE - PROGRESS NOTE DETAILS
MAYA Everett: Discussed case with Dr. Carrillo Lane who recommended to admit pt to hospital to trend CBC w/ diff, start octreotide 50mcg BID (increased from once daily dosing), and continue ASA/Plavix since pt had Watchman procedure. Patient did not take medications today MAYA Everett: Discussed case with Dr. Carrillo Lane who recommended to admit pt to hospital to trend CBC w/ diff, start octreotide 50mcg BID (increased from once daily dosing), and continue ASA/Plavix since pt had Watchman procedure. Patient only took Octreotide subq medication this AM

## 2018-07-24 NOTE — H&P ADULT - PROBLEM SELECTOR PLAN 3
S/p Watchman 8 days ago  -Will c/w aspirin/plavix (risk of thrombosis > risk of bleeding now) S/p Doretha 8 days ago  -Will consult cardiology to see if both aspirin and plavix are necessary in the setting of this bleed

## 2018-07-24 NOTE — H&P ADULT - NSHPREVIEWOFSYSTEMS_GEN_ALL_CORE
REVIEW OF SYSTEMS:    CONSTITUTIONAL: No weakness, fevers or chills  EYES/ENT: No visual changes;  No vertigo or throat pain   NECK: No pain or stiffness  RESPIRATORY: No cough, wheezing, hemoptysis; No shortness of breath  CARDIOVASCULAR: No chest pain or palpitations  GASTROINTESTINAL: No abdominal or epigastric pain. No nausea, vomiting, or hematemesis; No diarrhea or constipation. +melena  GENITOURINARY: +dysuria, frequency, no blood  NEUROLOGICAL: No numbness or weakness  SKIN: No itching, rashes  ENDOCRINE: No polydipsia

## 2018-07-24 NOTE — H&P ADULT - PROBLEM SELECTOR PLAN 8
On antiplatelet therapy, will hold prophy AC in setting of melena  DASH/Consistent carb diet On antiplatelet therapy, will hold prophy AC in setting of melena  Consistent carb diet Home atorvastatin 10mg

## 2018-07-24 NOTE — H&P ADULT - ATTENDING COMMENTS
acute blood loss anemia 2/2 angioectasias, gi consult, octeotride 50mcg may need q8hr  monitor cbc q8hr  s/p watchman on asp/plavix, cardio consult on wether to c/w both or stop asa, needs something post wacthman for 3 months

## 2018-07-24 NOTE — H&P ADULT - ASSESSMENT
Mr Bryant is a 82 yo man with hx afib (s/p Watchman last week), multiple GI bleeds requiring multiple transfusions (11 last year),  CAD s/p two stents 2015, DM2 (on oral hyperglycemic medications) presents with melena.  He is pending repeat CBC to see if he has acute blood loss anemia seen at his PMD.

## 2018-07-24 NOTE — PROVIDER CONTACT NOTE (OTHER) - ASSESSMENT
Pt A+Ox4. Pt & spouse that he took Octreotide SQ this AM and only takes in daily. Pt is now ordered for Octreotide SQ q12h. Pt refusing until team speaks to his primary.

## 2018-07-24 NOTE — ED PROVIDER NOTE - ATTENDING CONTRIBUTION TO CARE
83y M hx HTN, HLD, DM, CAD here with GI Bleed. Pt w Afib not on AC 2/2 recurrent GI bleeds thought to be due to AVM.  Was on Coumadin. Was getting weekly transfusion. Started on octreotide by GI and now QOD inc to Qd yesterday. Cardio did Watchman procedure 1 week ago at OSH. Started on ASA, plavix which cannot be stopped now. Procedure C/b urinary retention andmild hematuria. Guaiac pos in PCP office yesterday. OP labs frm yesterday Hgb 7.1.Hct 21. Baseline Hgb 10.2   PCP-Dr Lane - goes to hospitalist  Gen: WNWD NAD  HEENT: NCAT PERRL EOMI normal pharynx  Neck: supple  CV: RRR, no murmur  Lung: CTA BL  Abd: +BS soft NTND  Ext: wwp, palp pulses, FROMx4, no cce  Neuro: CN grossly intact, sensation intact, motor 5/5 throughout  AP: 83y M hx of Afib not on AC 2/2 chronic GI bleeds, HTN, HLD, DM, CAD here with GI Bleed. Labs, T&S, transfuse PRN. TBA. 83y M hx HTN, HLD, DM, CAD here with GI Bleed. Pt w Afib not on AC 2/2 recurrent GI bleeds thought to be due to AVM.  Was on Coumadin. Was getting weekly transfusion. Started on octreotide by GI and now QOD inc to Qd yesterday. Cardio did Watchman procedure 1 week ago at OSH. Started on ASA, plavix which cannot be stopped now. Procedure C/b urinary retention andmild hematuria. Guaiac pos in PCP office yesterday. OP labs frm yesterday Hgb 7.1.Hct 21. Baseline Hgb 10.2. Pt endorses PARRISH. No CP. No SOB at rest. No leg swelling.   PCP-Dr Lane - goes to hospitalist  Gen: WNWD NAD  HEENT: NCAT PERRL EOMI normal pharynx  Neck: supple  CV: RRR, no murmur  Lung: CTA BL  Abd: +BS soft NTND  Ext: wwp, palp pulses, FROMx4, no cce  Skin: R groin procedure site mild ecchymosis no swelling, chronic venous changes  BL LE   Neuro: A&Ox3, CN grossly intact, sensation intact, motor 5/5 throughout  AP: 83y M hx of Afib not on AC 2/2 chronic GI bleeds, HTN, HLD, DM, CAD here with GI Bleed. Labs, T&S, transfuse PRN. TBA Hospitalist.

## 2018-07-24 NOTE — CONSULT NOTE ADULT - ASSESSMENT
A/P:  82 yo man with hx afib (s/p Watchman last week), multiple GI bleeds requiring multiple transfusions (11 last year),  CAD s/p two stents 2015, DM2 (on oral hyperglycemic medications) presents with possible concern for melena. Patient's Hgb is stable at 9. Given that patient had his watchman 1 week ago, patient is still at risk of thromboembolism for atleast 4 week and should be on a/c for 4 weeks.     Plan:   - monitor pt on tele  - start patient on Heparin gtt  - continue Plavix 75 mg daily   - hold ASA 81 mg daily     Denny Kim MD  Cardiology fellow  x [phone] A/P:  82 yo man with hx afib (s/p Watchman last week), multiple GI bleeds requiring multiple transfusions (11 last year),  CAD s/p two stents 2015, DM2 (on oral hyperglycemic medications) presents with possible concern for melena. Patient's Hgb is stable at 9. Given that patient had his watchman 1 week ago, patient is still at risk of thromboembolism for atleast 4 week and should be on a/c for 4 weeks.     Plan:   - monitor pt on tele  - can start patient on Heparin gtt for now , maintain PTT 50-70   - can stop plavix, as pt's last PCI more than 3 years ago   - start ASA 81 mg daily   - monitor Hgb trend closely, if signs of recurrent GIB would hold off heparin gtt     Denny Kim MD  Cardiology fellow  x [phone] A/P:  82 yo man with hx afib (s/p Watchman last week), multiple GI bleeds requiring multiple transfusions (11 last year),  CAD s/p two stents 2015, DM2 (on oral hyperglycemic medications) presents with possible concern for melena. Patient's Hgb is stable at 9. Given that patient had his watchman 1 week ago, would continue A/C for atleast 4 weeks.     Plan:   - monitor pt on tele  - pt with significant bleeding events on warfarin in the past so would do Plavix 75 mg and ASA 81 mg as recommended by his EP Dr. Magdiel Trevino at [facility].   -trend CBC ruben Kim MD  Cardiology fellow  x [phone] A/P:  84 yo man with hx afib (s/p Watchman last week), multiple GI bleeds requiring multiple transfusions (11 last year),  CAD s/p two stents 2015, DM2 (on oral hyperglycemic medications) presents with possible concern for melena. Patient's Hgb is stable at 9. Given that patient had his watchman 1 week ago, would continue A/C for 3 mos per [place] protocol. proceeding w/ increasing Octreotide to bid per Dr. Santos and carefully monitoring CBC.    Plan:   - monitor pt on tele  - pt with significant bleeding events on warfarin in the past so would do Plavix 75 mg and ASA 81 mg as recommended by his EP Dr. Magdiel Trevino at [facility].   -trend CBC ruben Kim MD  Cardiology fellow  x [phone]

## 2018-07-24 NOTE — H&P ADULT - PROBLEM SELECTOR PLAN 4
On home furosemide, benicar, carvedilol, diltiazem.  Will hold while inpatient -On home furosemide, benicar, carvedilol, diltiazem.  Will hold while inpatient

## 2018-07-25 ENCOUNTER — TRANSCRIPTION ENCOUNTER (OUTPATIENT)
Age: 83
End: 2018-07-25

## 2018-07-25 LAB
ANION GAP SERPL CALC-SCNC: 14 MMOL/L — SIGNIFICANT CHANGE UP (ref 5–17)
APTT BLD: 31.5 SEC — SIGNIFICANT CHANGE UP (ref 27.5–37.4)
BUN SERPL-MCNC: 82 MG/DL — HIGH (ref 7–23)
CALCIUM SERPL-MCNC: 9.1 MG/DL — SIGNIFICANT CHANGE UP (ref 8.4–10.5)
CHLORIDE SERPL-SCNC: 104 MMOL/L — SIGNIFICANT CHANGE UP (ref 96–108)
CO2 SERPL-SCNC: 23 MMOL/L — SIGNIFICANT CHANGE UP (ref 22–31)
CREAT SERPL-MCNC: 1.92 MG/DL — HIGH (ref 0.5–1.3)
GLUCOSE SERPL-MCNC: 163 MG/DL — HIGH (ref 70–99)
HBA1C BLD-MCNC: 6.1 % — HIGH (ref 4–5.6)
HCT VFR BLD CALC: 28.7 % — LOW (ref 39–50)
HCT VFR BLD CALC: 30.5 % — LOW (ref 39–50)
HGB BLD-MCNC: 9.6 G/DL — LOW (ref 13–17)
HGB BLD-MCNC: 9.9 G/DL — LOW (ref 13–17)
INR BLD: 1.19 RATIO — HIGH (ref 0.88–1.16)
MAGNESIUM SERPL-MCNC: 1.9 MG/DL — SIGNIFICANT CHANGE UP (ref 1.6–2.6)
MCHC RBC-ENTMCNC: 27.1 PG — SIGNIFICANT CHANGE UP (ref 27–34)
MCHC RBC-ENTMCNC: 27.8 PG — SIGNIFICANT CHANGE UP (ref 27–34)
MCHC RBC-ENTMCNC: 32.6 GM/DL — SIGNIFICANT CHANGE UP (ref 32–36)
MCHC RBC-ENTMCNC: 33.6 GM/DL — SIGNIFICANT CHANGE UP (ref 32–36)
MCV RBC AUTO: 82.6 FL — SIGNIFICANT CHANGE UP (ref 80–100)
MCV RBC AUTO: 83.1 FL — SIGNIFICANT CHANGE UP (ref 80–100)
PHOSPHATE SERPL-MCNC: 4.1 MG/DL — SIGNIFICANT CHANGE UP (ref 2.5–4.5)
PLATELET # BLD AUTO: 134 K/UL — LOW (ref 150–400)
PLATELET # BLD AUTO: 137 K/UL — LOW (ref 150–400)
POTASSIUM SERPL-MCNC: 4.2 MMOL/L — SIGNIFICANT CHANGE UP (ref 3.5–5.3)
POTASSIUM SERPL-SCNC: 4.2 MMOL/L — SIGNIFICANT CHANGE UP (ref 3.5–5.3)
PROTHROM AB SERPL-ACNC: 13 SEC — HIGH (ref 9.8–12.7)
RBC # BLD: 3.47 M/UL — LOW (ref 4.2–5.8)
RBC # BLD: 3.67 M/UL — LOW (ref 4.2–5.8)
RBC # FLD: 14.5 % — SIGNIFICANT CHANGE UP (ref 10.3–14.5)
RBC # FLD: 14.8 % — HIGH (ref 10.3–14.5)
SODIUM SERPL-SCNC: 141 MMOL/L — SIGNIFICANT CHANGE UP (ref 135–145)
WBC # BLD: 7.6 K/UL — SIGNIFICANT CHANGE UP (ref 3.8–10.5)
WBC # BLD: 9.6 K/UL — SIGNIFICANT CHANGE UP (ref 3.8–10.5)
WBC # FLD AUTO: 7.6 K/UL — SIGNIFICANT CHANGE UP (ref 3.8–10.5)
WBC # FLD AUTO: 9.6 K/UL — SIGNIFICANT CHANGE UP (ref 3.8–10.5)

## 2018-07-25 PROCEDURE — 99223 1ST HOSP IP/OBS HIGH 75: CPT

## 2018-07-25 PROCEDURE — 99233 SBSQ HOSP IP/OBS HIGH 50: CPT | Mod: GC

## 2018-07-25 RX ADMIN — Medication 81 MILLIGRAM(S): at 18:10

## 2018-07-25 RX ADMIN — PANTOPRAZOLE SODIUM 40 MILLIGRAM(S): 20 TABLET, DELAYED RELEASE ORAL at 21:35

## 2018-07-25 RX ADMIN — PANTOPRAZOLE SODIUM 40 MILLIGRAM(S): 20 TABLET, DELAYED RELEASE ORAL at 09:07

## 2018-07-25 RX ADMIN — ATORVASTATIN CALCIUM 20 MILLIGRAM(S): 80 TABLET, FILM COATED ORAL at 21:35

## 2018-07-25 RX ADMIN — OCTREOTIDE ACETATE 50 MICROGRAM(S): 200 INJECTION, SOLUTION INTRAVENOUS; SUBCUTANEOUS at 18:10

## 2018-07-25 RX ADMIN — CARVEDILOL PHOSPHATE 3.12 MILLIGRAM(S): 80 CAPSULE, EXTENDED RELEASE ORAL at 21:35

## 2018-07-25 RX ADMIN — SODIUM CHLORIDE 75 MILLILITER(S): 9 INJECTION INTRAMUSCULAR; INTRAVENOUS; SUBCUTANEOUS at 06:29

## 2018-07-25 RX ADMIN — CEFTRIAXONE 100 GRAM(S): 500 INJECTION, POWDER, FOR SOLUTION INTRAMUSCULAR; INTRAVENOUS at 09:08

## 2018-07-25 RX ADMIN — FINASTERIDE 5 MILLIGRAM(S): 5 TABLET, FILM COATED ORAL at 18:10

## 2018-07-25 RX ADMIN — TAMSULOSIN HYDROCHLORIDE 0.4 MILLIGRAM(S): 0.4 CAPSULE ORAL at 21:35

## 2018-07-25 RX ADMIN — CARVEDILOL PHOSPHATE 3.12 MILLIGRAM(S): 80 CAPSULE, EXTENDED RELEASE ORAL at 09:08

## 2018-07-25 RX ADMIN — Medication 2: at 09:08

## 2018-07-25 RX ADMIN — CLOPIDOGREL BISULFATE 75 MILLIGRAM(S): 75 TABLET, FILM COATED ORAL at 18:10

## 2018-07-25 RX ADMIN — Medication 2: at 18:15

## 2018-07-25 RX ADMIN — OCTREOTIDE ACETATE 50 MICROGRAM(S): 200 INJECTION, SOLUTION INTRAVENOUS; SUBCUTANEOUS at 06:24

## 2018-07-25 RX ADMIN — Medication 3: at 13:09

## 2018-07-25 NOTE — CONSULT NOTE ADULT - SUBJECTIVE AND OBJECTIVE BOX
Patient is a 83y old  Male who presents with a chief complaint of Anemia (2018 13:03)    HPI:  Mr Bryant is a 82 yo man well known to DR. Choe, with PMHx of HTN, HLD, DM type2, MI (), CAD s/p PCI x 2 6/15, L carotid stenosis sp CEA (), pAfib, Lymphedema, Iron deficiency anemia, who has had recurrent GI bleeding for the past 2-3 years. He has had multiple endoscopies, colonoscopies, push, single and double balloon enteroscopies as well as video capsule endoscopies which have revealed at times bleeding, at times nonbleeding small bowel AVMs. He has had multiple blood transfusions. 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 Santos. He underwent Watchman's procedure last week with the hopes of discontinuing anticoagulation and preventing further GI bleeding. He is currently on Asprin and plavix per protocol. 3 days ago he started developing melena. Hgb was found to have dropped from around 10 to 7.2 and the FOBT was positive, prompting his admission.   Pt denies abdominal pain, nausea, emesis, fever, chills   PAST MEDICAL & SURGICAL HISTORY:  Coronary artery disease due to calcified coronary lesion  Stented coronary artery  Anemia  Carotid stenosis  DM (diabetes mellitus)  PAF (paroxysmal atrial fibrillation)  HLD (hyperlipidemia)  HTN (hypertension)  MI (myocardial infarction)  Former smoker  History of tonsillectomy  H/O carotid endarterectomy    Allergies  Levaquin (Other)  penicillin (Other)    MEDICATIONS  (STANDING):  aspirin enteric coated 81 milliGRAM(s) Oral daily  atorvastatin 20 milliGRAM(s) Oral at bedtime  carvedilol 3.125 milliGRAM(s) Oral every 12 hours  cefTRIAXone   IVPB 1 Gram(s) IV Intermittent every 24 hours  clopidogrel Tablet 75 milliGRAM(s) Oral daily  dextrose 5%. 1000 milliLiter(s) (50 mL/Hr) IV Continuous <Continuous>  dextrose 50% Injectable 12.5 Gram(s) IV Push once  dextrose 50% Injectable 25 Gram(s) IV Push once  dextrose 50% Injectable 25 Gram(s) IV Push once  diltiazem    Tablet 45 milliGRAM(s) Oral every 6 hours  finasteride 5 milliGRAM(s) Oral daily  insulin lispro (HumaLOG) corrective regimen sliding scale   SubCutaneous three times a day before meals  octreotide  Injectable 50 MICROGram(s) SubCutaneous two times a day  pantoprazole  Injectable 40 milliGRAM(s) IV Push every 12 hours  tamsulosin 0.4 milliGRAM(s) Oral at bedtime    MEDICATIONS  (PRN):  dextrose 40% Gel 15 Gram(s) Oral once PRN Blood Glucose LESS THAN 70 milliGRAM(s)/deciliter  glucagon  Injectable 1 milliGRAM(s) IntraMuscular once PRN Glucose LESS THAN 70 milligrams/deciliter    Social History: lives with wife  Denies smoking, alcohol, or drug abuse    Family History: noncontributory    Review of Systems:  General: Denies weight loss, anorexia, lethargy, night sweats, fever, chills  Cardiac: Denies chest pain, shortness of breath, dyspnea on exertion, syncope, lower extremity edema  Respiratory: Denies cough, dyspnea, wheezing  Gastrointestinal: See HPI. Denies dysphagia, odynophagia,   Genitourinary: Denies dysuria, hematuria, frequency  Dermatologic: Denies jaundice, pruritis, rashes  Hematologic: Denies abnormal bleeding, bruising, petechaie   Musculoskeletal: Denies muscle aches, weakness, swelling  Rheumatologic: Denies joint pain, swelling, stiffness  Neurologic: Denies headache, paresthesias, changes in vision or speech    Vital Signs Last 24 Hrs  T(C): 36.4 (2018 06:37), Max: 36.6 (2018 09:38)  T(F): 97.6 (2018 06:37), Max: 97.8 (2018 09:38)  HR: 80 (2018 06:37) (64 - 100)  BP: 132/75 (2018 06:37) (110/79 - 153/70)  BP(mean): --  RR: 18 (2018 06:37) (16 - 18)  SpO2: 99% (2018 06:37) (97% - 100%)    PHYSICAL EXAM:  Constitutional: Alert and oriented x 3, in no acute distress  HEENT: NCAT, no scleral icterus,   NECK: no palpable masses, asymmetry, or lymphadenopathy  Cardiovascular: RRR, S1 and S2, no audible murmurs  Respiratory: Clear to ascultation bilateraly, no wheezes, rales, rhonchi  Gastrointestinal: soft, NTND, normactive bowel sounds, no palpable HSM   Extremities: No peripheral edema, clubbing, or cyanosis  Psychiatric: Normal mood, normal affect  Skin: No rashes, jaundice, ecchymosis    LABS:                        9.9    7.6   )-----------( 134      ( 2018 06:54 )             30.5     -    141  |  104  |  82<H>  ----------------------------<  163<H>  4.2   |  23  |  1.92<H>    Ca    9.1      2018 06:45  Phos  4.1       Mg     1.9         TPro  7.7  /  Alb  4.0  /  TBili  0.5  /  DBili  x   /  AST  64<H>  /  ALT  63<H>  /  AlkPhos  587<H>      PT/INR - ( 2018 06:47 )   PT: 13.0 sec;   INR: 1.19 ratio         PTT - ( 2018 06:47 )  PTT:31.5 sec  Urinalysis Basic - ( 2018 10:31 )    Color: Yellow / Appearance: SL Turbid / S.015 / pH: x  Gluc: x / Ketone: Negative  / Bili: Negative / Urobili: Negative   Blood: x / Protein: 30 mg/dL / Nitrite: Negative   Leuk Esterase: Large / RBC: 3-5 /HPF / WBC >50 /HPF   Sq Epi: x / Non Sq Epi: OCC /HPF / Bacteria: x      LIVER FUNCTIONS - ( 2018 10:31 )  Alb: 4.0 g/dL / Pro: 7.7 g/dL / ALK PHOS: 587 U/L / ALT: 63 U/L / AST: 64 U/L / GGT: x             RADIOLOGY & ADDITIONAL TESTS: Patient is a 83y old  Male who presents with a chief complaint of Anemia (2018 13:03)    HPI:  Mr Bryant is a 84 yo man well known to DR. Choe, with PMHx of HTN, HLD, DM type2, MI (), CAD s/p PCI x 2 6/15, L carotid stenosis sp CEA (), pAfib, Lymphedema, Iron deficiency anemia, who has had recurrent GI bleeding for the past 2-3 years. He has had multiple endoscopies, colonoscopies, push, single and double balloon enteroscopies as well as video capsule endoscopies which have revealed at times bleeding, at times nonbleeding small bowel AVMs. He has had multiple blood transfusions. 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 Santos. He was weaned to once daily dosing over the winter and hemoglobin remained at 10. He underwent Watchman's procedure last Monday. He was started on Asprin and plavix post per protocol. he briefly developed hematuria that resolved. He started to not feel well over the weekend. Saw Dr. Lane on Monday who check FOBT which was positive. Stools reportedly appeared black. Hemoglobin was found to be 7.5 prompting his admission. Hgb 9.4-->9-->7.8. S/p 1u prbc overnight. Hemoglobin 9.9 this am.  Pt denies abdominal pain, nausea, emesis, fever, chills     PAST MEDICAL & SURGICAL HISTORY:  Coronary artery disease due to calcified coronary lesion  Stented coronary artery  Anemia  Carotid stenosis  DM (diabetes mellitus)  PAF (paroxysmal atrial fibrillation)  HLD (hyperlipidemia)  HTN (hypertension)  MI (myocardial infarction)  Former smoker  History of tonsillectomy  H/O carotid endarterectomy    Allergies  Levaquin (Other)  penicillin (Other)    MEDICATIONS  (STANDING):  aspirin enteric coated 81 milliGRAM(s) Oral daily  atorvastatin 20 milliGRAM(s) Oral at bedtime  carvedilol 3.125 milliGRAM(s) Oral every 12 hours  cefTRIAXone   IVPB 1 Gram(s) IV Intermittent every 24 hours  clopidogrel Tablet 75 milliGRAM(s) Oral daily  dextrose 5%. 1000 milliLiter(s) (50 mL/Hr) IV Continuous <Continuous>  dextrose 50% Injectable 12.5 Gram(s) IV Push once  dextrose 50% Injectable 25 Gram(s) IV Push once  dextrose 50% Injectable 25 Gram(s) IV Push once  diltiazem    Tablet 45 milliGRAM(s) Oral every 6 hours  finasteride 5 milliGRAM(s) Oral daily  insulin lispro (HumaLOG) corrective regimen sliding scale   SubCutaneous three times a day before meals  octreotide  Injectable 50 MICROGram(s) SubCutaneous two times a day  pantoprazole  Injectable 40 milliGRAM(s) IV Push every 12 hours  tamsulosin 0.4 milliGRAM(s) Oral at bedtime    MEDICATIONS  (PRN):  dextrose 40% Gel 15 Gram(s) Oral once PRN Blood Glucose LESS THAN 70 milliGRAM(s)/deciliter  glucagon  Injectable 1 milliGRAM(s) IntraMuscular once PRN Glucose LESS THAN 70 milligrams/deciliter    Social History: lives with wife  Denies smoking, alcohol, or drug abuse    Family History: noncontributory    Review of Systems:  General: Denies weight loss, anorexia, lethargy, night sweats, fever, chills  Cardiac: Denies chest pain, shortness of breath, dyspnea on exertion, syncope, lower extremity edema  Respiratory: Denies cough, dyspnea, wheezing  Gastrointestinal: See HPI. Denies dysphagia, odynophagia,   Genitourinary: Denies dysuria, hematuria, frequency  Dermatologic: Denies jaundice, pruritis, rashes  Hematologic: Denies abnormal bleeding, bruising, petechaie   Musculoskeletal: Denies muscle aches, weakness, swelling  Rheumatologic: Denies joint pain, swelling, stiffness  Neurologic: Denies headache, paresthesias, changes in vision or speech    Vital Signs Last 24 Hrs  T(C): 36.4 (2018 06:37), Max: 36.6 (2018 09:38)  T(F): 97.6 (2018 06:37), Max: 97.8 (2018 09:38)  HR: 80 (2018 06:37) (64 - 100)  BP: 132/75 (2018 06:37) (110/79 - 153/70)  BP(mean): --  RR: 18 (2018 06:37) (16 - 18)  SpO2: 99% (2018 06:37) (97% - 100%)    PHYSICAL EXAM:  Constitutional: Alert and oriented x 3, in no acute distress  HEENT: NCAT, no scleral icterus,   NECK: no palpable masses, asymmetry, or lymphadenopathy  Cardiovascular: RRR, S1 and S2, no audible murmurs  Respiratory: Clear to ascultation bilateraly, no wheezes, rales, rhonchi  Gastrointestinal: soft, NTND, normactive bowel sounds, no palpable HSM   Extremities: No peripheral edema, clubbing, or cyanosis  Psychiatric: Normal mood, normal affect  Skin: No rashes, jaundice, ecchymosis    LABS:                        9.9    7.6   )-----------( 134      ( 2018 06:54 )             30.5     07-    141  |  104  |  82<H>  ----------------------------<  163<H>  4.2   |  23  |  1.92<H>    Ca    9.1      2018 06:45  Phos  4.1       Mg     1.9         TPro  7.7  /  Alb  4.0  /  TBili  0.5  /  DBili  x   /  AST  64<H>  /  ALT  63<H>  /  AlkPhos  587<H>      PT/INR - ( 2018 06:47 )   PT: 13.0 sec;   INR: 1.19 ratio         PTT - ( 2018 06:47 )  PTT:31.5 sec  Urinalysis Basic - ( 2018 10:31 )    Color: Yellow / Appearance: SL Turbid / S.015 / pH: x  Gluc: x / Ketone: Negative  / Bili: Negative / Urobili: Negative   Blood: x / Protein: 30 mg/dL / Nitrite: Negative   Leuk Esterase: Large / RBC: 3-5 /HPF / WBC >50 /HPF   Sq Epi: x / Non Sq Epi: OCC /HPF / Bacteria: x      LIVER FUNCTIONS - ( 2018 10:31 )  Alb: 4.0 g/dL / Pro: 7.7 g/dL / ALK PHOS: 587 U/L / ALT: 63 U/L / AST: 64 U/L / GGT: x             RADIOLOGY & ADDITIONAL TESTS:

## 2018-07-25 NOTE — DISCHARGE NOTE ADULT - PLAN OF CARE
Ongoing management You were in the hospital with a GI bleed most likely from the bleeding areas in your small intestine that you and your outpatient gastroenterologists are well aware of.  We watched your hemoglobin while you were in the hospital and decided to give you a transfusion one of the nights for a low count.  After the transfusion your hemoglobin remained stable.  After discussing the case with the gastroenterology team, we deemed you safe for discharge with close follow-up.  We recommend that you see Dr. Santos in 1-2 weeks to discuss this hospitalization. We continued aspirin and plavix for the Watchman for your atrial fibrillation.  The cardiology team evaluated you and recommended to continue those medications for at least four weeks.  We also recommend you continue to follow with Dr. Trevino regularly to discuss the medicines you will need for the Watchman You had a UTI when you came to the hospital.  You were on IV antibiotics while in the hospital.  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 You received one blood transfusion while inpatient and your hemoglobin was stable afterwards. Your hemoglobin has been stable since and you can follow up with your gastroenterologist.  We recommend that you see Dr. Santos in 1-2 weeks to discuss this hospitalization. Please have your hemoglobin checked within 1 week of discharge. Please report to an ED if you experience further bleeding episodes, lightheadedness/dizziness, and/or shortness of breath. Please also follow up with Dr. Lane after discharge. Because you had a Watchman procedure last week, you must continue taking aspirin and Plavix for at least four weeks.  Please follow up with your cardiologist within 1-2 weeks of discharge You were diagnosed with a UTI during this hospitalization. You received 3 days of IV ceftriaxone. Please continue to take Keflex 500mg PO twice a day for four more days starting of 7/27/18. Please follow up with your primary care doctor and your urologist. Continue treatment You have a history of high blood pressure, which was stable during this admission. Your Benicar and Lasix were held. You only received Carvedilol and Diltiazem, and your blood pressure remained at goal. Please continue to hold your Lasix and Benicar until you follow up with Dr. Lane.

## 2018-07-25 NOTE — DISCHARGE NOTE ADULT - PROVIDER TOKENS
FREE:[LAST:[Tom],FIRST:[Tadeo],PHONE:[phone]],FAX:[(   )    -],ADDRESS:[[address] [place] [address]]] FREE:[LAST:[Tom],FIRST:[Tadeo],PHONE:[phone]:[(   )    -],ADDRESS:[[address] [place] [address], [place] [address]]],TOKEN:'[id number]'

## 2018-07-25 NOTE — DISCHARGE NOTE ADULT - CARE PROVIDERS DIRECT ADDRESSES
,DirectAddress_Unknown ,DirectAddress_Unknown,[email or web address]

## 2018-07-25 NOTE — DISCHARGE NOTE ADULT - HOSPITAL COURSE
Mr Bryant is a 82 yo man with hx afib (s/p Watchman last week), multiple GI bleeds requiring multiple transfusions from a jejunal source seen on VCE,  CAD s/p two stents 2015, DM2 (on oral hyperglycemic medications) presents with melena.  His hemoglobin was initially stable on presentation but one night it dropped below 8 so he was transfused a unit.  His hemoglobin remained stable after the transfusion.  Cardiology evaluated him for alternative antiplatelet therapy for his Watchman but concluded that aspirin and plavix were best.  GI evaluated him for a possible endoscopy but decided that since his hemoglobin was stable enough to not warrant another procedure looking for another source of his bleed.  He was continued on his home dose of octreotide and will continue to get that at home. Mr Bryant is a 82 yo man with hx afib (s/p Watchman last week), multiple GI bleeds requiring multiple transfusions from a jejunal source seen on VCE,  CAD s/p two stents 2015, DM2 (on oral hyperglycemic medications) who presented to [facility] with melena in the setting of dual antiplatelet therapy after Watchman procedure. The patient was admitted and started on octreotide IV BID. Gastroenterology was consulted and recommended continuing with this regimen as patient's jejunal lesions had been previously established. Cardiology was consulted and recommended continuation of dual antiplatelet therapy for at least four weeks given the recent procedure. The patient's hemoglobin dropped to 7.8 on the night of admission and he received 1u pRBC. The patient's hemoglobin was trended and remained stable. He is now medically stable and cleared for discharge home with outpatient follow up.

## 2018-07-25 NOTE — DISCHARGE NOTE ADULT - MEDICATION SUMMARY - MEDICATIONS TO TAKE
I will START or STAY ON the medications listed below when I get home from the hospital:    finasteride 5 mg oral tablet  -- 1 tab(s) by mouth once a day  -- Indication: For BPH (benign prostatic hyperplasia)    aspirin 81 mg oral tablet  -- 1 tab(s) by mouth once a day  -- Indication: For Watchman Procedure    Flomax 0.4 mg oral capsule  -- 1 cap(s) by mouth once a day  -- Indication: For BPH (benign prostatic hyperplasia)    Cardizem  mg/24 hours oral capsule, extended release  -- 1 cap(s) by mouth once a day (at bedtime)  -- Indication: For Hypertension    glipiZIDE 5 mg oral tablet  -- 1 tab(s) by mouth 2 times a day  -- Indication: For DM (diabetes mellitus)    Januvia 25 mg oral tablet  -- 1 tab(s) by mouth once a day (in the evening)  -- Indication: For DM (diabetes mellitus)    atorvastatin 20 mg oral tablet  -- 1 tab(s) by mouth once a day (at bedtime)  -- Indication: For Hyperlipidemia    Plavix 75 mg oral tablet  -- 1 tab(s) by mouth once a day  -- Indication: For Watchman Procedure    CARVEDILOL 6.25 MG TABS  -- 1 tab(s) by mouth 2 times a day  -- Indication: For Hypertension    cephalexin 500 mg oral capsule  -- 1 cap(s) by mouth every 12 hours   -- Finish all this medication unless otherwise directed by prescriber.    -- Indication: For UTI (urinary tract infection)    pantoprazole 40 mg oral delayed release tablet  -- 1 tab(s) by mouth 2 times a day (before meals)  -- Indication: For GI bleed    OCTREOTIDE ACETATE 50 MCG/ML SOLN  -- Indication: For GI bleed    multivitamin  -- 1 tab(s) by mouth once a day  -- Indication: For Dietary Supplement    PreserVision oral tablet  -- 1 tab(s) by mouth 2 times a day  -- Indication: For Dietary Supplement    ascorbic acid 500 mg oral tablet  -- 2 tab(s) by mouth once a day  -- Indication: For Dietary Supplement

## 2018-07-25 NOTE — PROGRESS NOTE ADULT - PROBLEM SELECTOR PLAN 5
-Cr 2.05 today, had baseline of 1.6-1.7 last year  -Will give 1L (75ml/hr) today  -Will continue to monitor -Cr 1.9 today, had baseline of 1.6-1.7 last year  -Improved with 50ml/hr fluids  -Will continue to monitor

## 2018-07-25 NOTE — DISCHARGE NOTE ADULT - PATIENT PORTAL LINK FT
You can access the [facility] Patient Portal, offered by [facility], by registering with the following website: [email or web address]

## 2018-07-25 NOTE — DISCHARGE NOTE ADULT - CARE PLAN
Principal Discharge DX:	GI bleed  Goal:	Ongoing management  Assessment and plan of treatment:	You were in the hospital with a GI bleed most likely from the bleeding areas in your small intestine that you and your outpatient gastroenterologists are well aware of.  We watched your hemoglobin while you were in the hospital and decided to give you a transfusion one of the nights for a low count.  After the transfusion your hemoglobin remained stable.  After discussing the case with the gastroenterology team, we deemed you safe for discharge with close follow-up.  We recommend that you see Dr. Santos in 1-2 weeks to discuss this hospitalization.  Secondary Diagnosis:	PAF (paroxysmal atrial fibrillation)  Goal:	Ongoing management  Assessment and plan of treatment:	We continued aspirin and plavix for the Watchman for your atrial fibrillation.  The cardiology team evaluated you and recommended to continue those medications for at least four weeks.  We also recommend you continue to follow with Dr. Trevino regularly to discuss the medicines you will need for the Watchman  Secondary Diagnosis:	UTI (urinary tract infection)  Goal:	Ongoing management  Assessment and plan of treatment:	You had a UTI when you came to the hospital.  You were on IV antibiotics while in the hospital.  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 You received one blood transfusion while inpatient and your hemoglobin was stable afterwards. Your hemoglobin has been stable since and you can follow up with your gastroenterologist.  We recommend that you see Dr. Santos in 1-2 weeks to discuss this hospitalization. Please have your hemoglobin checked within 1 week of discharge. Please report to an ED if you experience further bleeding episodes, lightheadedness/dizziness, and/or shortness of breath. Please also follow up with Dr. Lane after discharge.  Secondary Diagnosis:	PAF (paroxysmal atrial fibrillation)  Goal:	Ongoing management  Assessment and plan of treatment:	Because you had a Watchman procedure last week, you must continue taking aspirin and Plavix for at least four weeks.  Please follow up with your cardiologist within 1-2 weeks of discharge  Secondary Diagnosis:	UTI (urinary tract infection)  Goal:	Ongoing management  Assessment and plan of treatment:	You were diagnosed with a UTI during this hospitalization. You received 3 days of IV ceftriaxone. Please continue to take Keflex 500mg PO twice a day for four more days starting of 7/27/18. Please follow up with your primary care doctor and your urologist.  Secondary Diagnosis:	HTN (hypertension)  Goal:	Continue treatment  Assessment and plan of treatment:	You have a history of high blood pressure, which was stable during this admission. Your Benicar and Lasix were held. You only received Carvedilol and Diltiazem, and your blood pressure remained at goal. Please continue to hold your Lasix and Benicar until you follow up with Dr. Lane.

## 2018-07-25 NOTE — DISCHARGE NOTE ADULT - CARE PROVIDER_API CALL
Tadeo Santos  [address] [place] [address]  Phone: [phone]  Fax: (       - Tadeo Santos  [address] [place] [address]  Phone: [phone]  Fax: (   )    -    [address]), Internal Medicine  [address] [place] [id number]  Phone: [phone]  Fax: [phone]

## 2018-07-25 NOTE — PROVIDER CONTACT NOTE (OTHER) - ASSESSMENT
patient asymptomatic. patient received Cardizem 45 mg po at 0630 am. patient A&Ox3, Afib 70-90's on tele monitor. /75, hr 80, pox 99%, T 97.6F

## 2018-07-25 NOTE — DISCHARGE NOTE ADULT - MEDICATION SUMMARY - MEDICATIONS TO STOP TAKING
I will STOP taking the medications listed below when I get home from the hospital:    FUROSEMIDE 40 MG TABS  -- 1 tab(s) by mouth once a day    Benicar 40 mg oral tablet  -- 1 tab(s) by mouth once a day

## 2018-07-25 NOTE — PROGRESS NOTE ADULT - PROBLEM SELECTOR PLAN 4
-On home furosemide, benicar, carvedilol, diltiazem.  Will hold while inpatient -On home furosemide, benicar, carvedilol, diltiazem  -Will give carvedilol, diltiazem

## 2018-07-25 NOTE — PROGRESS NOTE ADULT - PROBLEM SELECTOR PLAN 3
S/p Doretha 8 days ago  -Will consult cardiology to see if both aspirin and plavix are necessary in the setting of this bleed S/p Doretha 8 days ago  -Appreciate cardiology recs above

## 2018-07-25 NOTE — PROGRESS NOTE ADULT - PROBLEM SELECTOR PLAN 2
Urinalysis positive, will check culture + dysuria  -Start ceftriaxone 1g Qday Urinalysis positive, will check culture + dysuria  -Start ceftriaxone 1g Qday (day 2)

## 2018-07-25 NOTE — PROGRESS NOTE ADULT - SUBJECTIVE AND OBJECTIVE BOX
CHIEF COMPLAINT: melena    Interval Events:    Got a unit of blood for a HgB of 7.8 (dropped from 9 eight hours ago).  No issues this morning.  Has not had a bowel movement.      REVIEW OF SYSTEMS:    CONSTITUTIONAL: No weakness, fevers or chills  EYES/ENT: No visual changes;  No vertigo or throat pain   NECK: No pain or stiffness  RESPIRATORY: No cough, wheezing, hemoptysis; No shortness of breath  CARDIOVASCULAR: No chest pain or palpitations  GASTROINTESTINAL: No abdominal or epigastric pain. No nausea, vomiting, or hematemesis; No diarrhea or constipation. No melena or hematochezia.  GENITOURINARY: No dysuria, frequency or hematuria  NEUROLOGICAL: No numbness or weakness  SKIN: No itching, rashes      OBJECTIVE:  ICU Vital Signs Last 24 Hrs  T(C): 36.4 (2018 06:37), Max: 36.7 (2018 08:52)  T(F): 97.6 (2018 06:37), Max: 98.1 (2018 08:52)  HR: 80 (2018 06:37) (64 - 120)  BP: 132/75 (2018 06:37) (110/79 - 153/70)  BP(mean): --  ABP: --  ABP(mean): --  RR: 18 (2018 06:37) (16 - 18)  SpO2: 99% (2018 06:37) (97% - 100%)        07-24 @ 07:01  -  07-25 @ 07:00  --------------------------------------------------------  IN: 500 mL / OUT: 300 mL / NET: 200 mL      CAPILLARY BLOOD GLUCOSE      POCT Blood Glucose.: 128 mg/dL (2018 21:37)    PHYSICAL EXAM:  GENERAL: NAD, well-developed  HEAD:  Atraumatic, Normocephalic  EYES: EOMI, PERRLA, conjunctiva and sclera clear  NECK: Supple, No JVD  CHEST/LUNG: Clear to auscultation bilaterally; No wheeze  HEART: Regular rate and rhythm; No murmurs, rubs, or gallops  ABDOMEN: Soft, Nontender, Nondistended; Bowel sounds present  EXTREMITIES:  2+ Peripheral Pulses, No clubbing, cyanosis, or edema  PSYCH: AAOx3  NEUROLOGY: non-focal    LINES:    HOSPITAL MEDICATIONS:  aspirin enteric coated 81 milliGRAM(s) Oral daily  clopidogrel Tablet 75 milliGRAM(s) Oral daily    cefTRIAXone   IVPB 1 Gram(s) IV Intermittent every 24 hours    carvedilol 3.125 milliGRAM(s) Oral every 12 hours  diltiazem    Tablet 45 milliGRAM(s) Oral every 6 hours  tamsulosin 0.4 milliGRAM(s) Oral at bedtime    atorvastatin 20 milliGRAM(s) Oral at bedtime  dextrose 40% Gel 15 Gram(s) Oral once PRN  dextrose 50% Injectable 12.5 Gram(s) IV Push once  dextrose 50% Injectable 25 Gram(s) IV Push once  dextrose 50% Injectable 25 Gram(s) IV Push once  finasteride 5 milliGRAM(s) Oral daily  glucagon  Injectable 1 milliGRAM(s) IntraMuscular once PRN  insulin lispro (HumaLOG) corrective regimen sliding scale   SubCutaneous three times a day before meals  octreotide  Injectable 50 MICROGram(s) SubCutaneous two times a day        pantoprazole  Injectable 40 milliGRAM(s) IV Push every 12 hours        dextrose 5%. 1000 milliLiter(s) IV Continuous <Continuous>            LABS:                        7.8    7.4   )-----------( 112      ( 2018 21:25 )             23.8     Hgb Trend: 7.8<--, 9.0<--, 9.4<--  24    137  |  98  |  92<H>  ----------------------------<  282<H>  4.4   |  24  |  2.05<H>    Ca    9.5      2018 10:31    TPro  7.7  /  Alb  4.0  /  TBili  0.5  /  DBili  x   /  AST  64<H>  /  ALT  63<H>  /  AlkPhos  587<H>      Creatinine Trend: 2.05<--  PT/INR - ( 2018 10:31 )   PT: 12.7 sec;   INR: 1.16 ratio         PTT - ( 2018 10:31 )  PTT:32.6 sec  Urinalysis Basic - ( 2018 10:31 )    Color: Yellow / Appearance: SL Turbid / S.015 / pH: x  Gluc: x / Ketone: Negative  / Bili: Negative / Urobili: Negative   Blood: x / Protein: 30 mg/dL / Nitrite: Negative   Leuk Esterase: Large / RBC: 3-5 /HPF / WBC >50 /HPF   Sq Epi: x / Non Sq Epi: OCC /HPF / Bacteria: x            MICROBIOLOGY:     RADIOLOGY:  [ ] Reviewed and interpreted by me    EKG:

## 2018-07-25 NOTE — PROGRESS NOTE ADULT - PROBLEM SELECTOR PLAN 1
Initial CBC was 9.4, will repeat.  Very extensive history GI bleeding with evidence of jejunal bleeding on VCE 2/2 to angioectasias  Currently on aspirin and plavix for his Watchman which should not be stopped to prevent post-op thrombosis.  -Will continue to monitor Hgb q8hr  -Type and screen done in ED  -Will consult cardiology regarding aspirin/plavix for Watchman  -Hemodynamically stable, monitor vital signs q4hr  -GI consult  -Will continue his outpatient octreotide for bleeding prevention. Initial CBC was 9.4, will repeat.  Very extensive history GI bleeding with evidence of jejunal bleeding on VCE 2/2 to angioectasias  Currently on aspirin and plavix for his Watchman which should not be stopped to prevent post-op thrombosis.  -Will continue to monitor Hgb q8hr  -Type and screen done in ED  -Appreciate cardiology recs, will continue asprin/plavix  -Hemodynamically stable, monitor vital signs q4hr  -Appreciate GI recs, will continue to monitor hemoglobin  -Will continue his outpatient octreotide for bleeding prevention.

## 2018-07-25 NOTE — CONSULT NOTE ADULT - ASSESSMENT
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 Valeriano Santos, with acute drop in H&H after being initiated on plavix/aspirin post watchmans  procedure last week. H&H improved post PRBCs. No overt GI bleeding  -continue octreotide  -monitor H&H closely, transfuse prbc  -if H&H continues to drop pt will need to be re-evaluated for DBE by Dr. Santos  -aspirin/plavix per cardiology

## 2018-07-26 VITALS
TEMPERATURE: 98 F | SYSTOLIC BLOOD PRESSURE: 116 MMHG | DIASTOLIC BLOOD PRESSURE: 72 MMHG | RESPIRATION RATE: 19 BRPM | HEART RATE: 79 BPM | OXYGEN SATURATION: 98 %

## 2018-07-26 LAB
-  AMIKACIN: SIGNIFICANT CHANGE UP
-  AMOXICILLIN/CLAVULANIC ACID: SIGNIFICANT CHANGE UP
-  AMPICILLIN/SULBACTAM: SIGNIFICANT CHANGE UP
-  AMPICILLIN: SIGNIFICANT CHANGE UP
-  AZTREONAM: SIGNIFICANT CHANGE UP
-  CEFAZOLIN: SIGNIFICANT CHANGE UP
-  CEFEPIME: SIGNIFICANT CHANGE UP
-  CEFOXITIN: SIGNIFICANT CHANGE UP
-  CEFTRIAXONE: SIGNIFICANT CHANGE UP
-  CIPROFLOXACIN: SIGNIFICANT CHANGE UP
-  ERTAPENEM: SIGNIFICANT CHANGE UP
-  GENTAMICIN: SIGNIFICANT CHANGE UP
-  LEVOFLOXACIN: SIGNIFICANT CHANGE UP
-  MEROPENEM: SIGNIFICANT CHANGE UP
-  NITROFURANTOIN: SIGNIFICANT CHANGE UP
-  PIPERACILLIN/TAZOBACTAM: SIGNIFICANT CHANGE UP
-  TOBRAMYCIN: SIGNIFICANT CHANGE UP
-  TRIMETHOPRIM/SULFAMETHOXAZOLE: SIGNIFICANT CHANGE UP
ANION GAP SERPL CALC-SCNC: 12 MMOL/L — SIGNIFICANT CHANGE UP (ref 5–17)
BACTERIA UR CULT: ABNORMAL
BUN SERPL-MCNC: 81 MG/DL — HIGH (ref 7–23)
CALCIUM SERPL-MCNC: 8.8 MG/DL — SIGNIFICANT CHANGE UP (ref 8.4–10.5)
CHLORIDE SERPL-SCNC: 105 MMOL/L — SIGNIFICANT CHANGE UP (ref 96–108)
CO2 SERPL-SCNC: 23 MMOL/L — SIGNIFICANT CHANGE UP (ref 22–31)
CREAT SERPL-MCNC: 1.91 MG/DL — HIGH (ref 0.5–1.3)
CULTURE RESULTS: SIGNIFICANT CHANGE UP
GLUCOSE SERPL-MCNC: 189 MG/DL — HIGH (ref 70–99)
HCT VFR BLD CALC: 27.9 % — LOW (ref 39–50)
HCT VFR BLD CALC: 27.9 % — LOW (ref 39–50)
HGB BLD-MCNC: 8.7 G/DL — LOW (ref 13–17)
HGB BLD-MCNC: 9.4 G/DL — LOW (ref 13–17)
MAGNESIUM SERPL-MCNC: 2 MG/DL — SIGNIFICANT CHANGE UP (ref 1.6–2.6)
MCHC RBC-ENTMCNC: 25.9 PG — LOW (ref 27–34)
MCHC RBC-ENTMCNC: 27.6 PG — SIGNIFICANT CHANGE UP (ref 27–34)
MCHC RBC-ENTMCNC: 31 GM/DL — LOW (ref 32–36)
MCHC RBC-ENTMCNC: 33.6 GM/DL — SIGNIFICANT CHANGE UP (ref 32–36)
MCV RBC AUTO: 82.1 FL — SIGNIFICANT CHANGE UP (ref 80–100)
MCV RBC AUTO: 83.3 FL — SIGNIFICANT CHANGE UP (ref 80–100)
METHOD TYPE: SIGNIFICANT CHANGE UP
ORGANISM # SPEC MICROSCOPIC CNT: SIGNIFICANT CHANGE UP
ORGANISM # SPEC MICROSCOPIC CNT: SIGNIFICANT CHANGE UP
PHOSPHATE SERPL-MCNC: 3.3 MG/DL — SIGNIFICANT CHANGE UP (ref 2.5–4.5)
PLATELET # BLD AUTO: 130 K/UL — LOW (ref 150–400)
PLATELET # BLD AUTO: 138 K/UL — LOW (ref 150–400)
POTASSIUM SERPL-MCNC: 4.3 MMOL/L — SIGNIFICANT CHANGE UP (ref 3.5–5.3)
POTASSIUM SERPL-SCNC: 4.3 MMOL/L — SIGNIFICANT CHANGE UP (ref 3.5–5.3)
RBC # BLD: 3.35 M/UL — LOW (ref 4.2–5.8)
RBC # BLD: 3.4 M/UL — LOW (ref 4.2–5.8)
RBC # FLD: 14.7 % — HIGH (ref 10.3–14.5)
RBC # FLD: 14.8 % — HIGH (ref 10.3–14.5)
SODIUM SERPL-SCNC: 140 MMOL/L — SIGNIFICANT CHANGE UP (ref 135–145)
SPECIMEN SOURCE: SIGNIFICANT CHANGE UP
WBC # BLD: 10.5 K/UL — SIGNIFICANT CHANGE UP (ref 3.8–10.5)
WBC # BLD: 9 K/UL — SIGNIFICANT CHANGE UP (ref 3.8–10.5)
WBC # FLD AUTO: 10.5 K/UL — SIGNIFICANT CHANGE UP (ref 3.8–10.5)
WBC # FLD AUTO: 9 K/UL — SIGNIFICANT CHANGE UP (ref 3.8–10.5)

## 2018-07-26 PROCEDURE — 36430 TRANSFUSION BLD/BLD COMPNT: CPT

## 2018-07-26 PROCEDURE — 85730 THROMBOPLASTIN TIME PARTIAL: CPT

## 2018-07-26 PROCEDURE — 83735 ASSAY OF MAGNESIUM: CPT

## 2018-07-26 PROCEDURE — 86922 COMPATIBILITY TEST ANTIGLOB: CPT

## 2018-07-26 PROCEDURE — 86850 RBC ANTIBODY SCREEN: CPT

## 2018-07-26 PROCEDURE — 81001 URINALYSIS AUTO W/SCOPE: CPT

## 2018-07-26 PROCEDURE — 86901 BLOOD TYPING SEROLOGIC RH(D): CPT

## 2018-07-26 PROCEDURE — 80048 BASIC METABOLIC PNL TOTAL CA: CPT

## 2018-07-26 PROCEDURE — 85610 PROTHROMBIN TIME: CPT

## 2018-07-26 PROCEDURE — 71046 X-RAY EXAM CHEST 2 VIEWS: CPT

## 2018-07-26 PROCEDURE — 83036 HEMOGLOBIN GLYCOSYLATED A1C: CPT

## 2018-07-26 PROCEDURE — 82272 OCCULT BLD FECES 1-3 TESTS: CPT

## 2018-07-26 PROCEDURE — 96375 TX/PRO/DX INJ NEW DRUG ADDON: CPT

## 2018-07-26 PROCEDURE — 93005 ELECTROCARDIOGRAM TRACING: CPT

## 2018-07-26 PROCEDURE — 99285 EMERGENCY DEPT VISIT HI MDM: CPT | Mod: 25

## 2018-07-26 PROCEDURE — 99233 SBSQ HOSP IP/OBS HIGH 50: CPT | Mod: GC

## 2018-07-26 PROCEDURE — 82962 GLUCOSE BLOOD TEST: CPT

## 2018-07-26 PROCEDURE — 87186 SC STD MICRODIL/AGAR DIL: CPT

## 2018-07-26 PROCEDURE — P9040: CPT

## 2018-07-26 PROCEDURE — 85027 COMPLETE CBC AUTOMATED: CPT

## 2018-07-26 PROCEDURE — 96374 THER/PROPH/DIAG INJ IV PUSH: CPT

## 2018-07-26 PROCEDURE — 86900 BLOOD TYPING SEROLOGIC ABO: CPT

## 2018-07-26 PROCEDURE — 87086 URINE CULTURE/COLONY COUNT: CPT

## 2018-07-26 PROCEDURE — 80053 COMPREHEN METABOLIC PANEL: CPT

## 2018-07-26 PROCEDURE — 84100 ASSAY OF PHOSPHORUS: CPT

## 2018-07-26 RX ORDER — CEFTRIAXONE 500 MG/1
1 INJECTION, POWDER, FOR SOLUTION INTRAMUSCULAR; INTRAVENOUS EVERY 24 HOURS
Qty: 0 | Refills: 0 | Status: DISCONTINUED | OUTPATIENT
Start: 2018-07-26 | End: 2018-07-26

## 2018-07-26 RX ORDER — OLMESARTAN MEDOXOMIL 5 MG/1
1 TABLET, FILM COATED ORAL
Qty: 0 | Refills: 0 | COMMUNITY

## 2018-07-26 RX ADMIN — CEFTRIAXONE 100 GRAM(S): 500 INJECTION, POWDER, FOR SOLUTION INTRAMUSCULAR; INTRAVENOUS at 09:17

## 2018-07-26 RX ADMIN — OCTREOTIDE ACETATE 50 MICROGRAM(S): 200 INJECTION, SOLUTION INTRAVENOUS; SUBCUTANEOUS at 06:16

## 2018-07-26 RX ADMIN — Medication 3: at 13:23

## 2018-07-26 RX ADMIN — PANTOPRAZOLE SODIUM 40 MILLIGRAM(S): 20 TABLET, DELAYED RELEASE ORAL at 09:12

## 2018-07-26 RX ADMIN — CARVEDILOL PHOSPHATE 3.12 MILLIGRAM(S): 80 CAPSULE, EXTENDED RELEASE ORAL at 09:13

## 2018-07-26 RX ADMIN — Medication 5: at 09:13

## 2018-07-26 NOTE — PROGRESS NOTE ADULT - PROBLEM SELECTOR PLAN 9
-On antiplatelet therapy, will hold prophy AC in setting of melena  -Consistent carb diet
On antiplatelet therapy, will hold prophy AC in setting of melena  Consistent carb diet

## 2018-07-26 NOTE — PROGRESS NOTE ADULT - PROBLEM SELECTOR PLAN 1
-Initial CBC was 9.4, will repeat.  Very extensive history GI bleeding with evidence of jejunal bleeding on VCE 2/2 to angioectasias  Currently on aspirin and Plavix which cannot be stopped for at least 4 weeks  -Will continue to monitor Hgb q8hr, recheck this afternoon  -Type and screen done in ED  -Appreciate cardiology recs, will continue asprin/plavix  -Hemodynamically stable  -Appreciate GI recs, will continue to monitor hemoglobin. If drops, will consider transfer  -Will continue his outpatient octreotide for bleeding prevention.

## 2018-07-26 NOTE — PROGRESS NOTE ADULT - ASSESSMENT
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 Valeriano Santos, with acute drop in H&H after being initiated on plavix/aspirin post watchmans  procedure last week. H&H improved post PRBCs but is mildly downtrended today. No overt GI bleeding  -continue octreotide BID  -monitor H&H closely, transfuse prbc  -if H&H continues to drop pt will need to be re-evaluated for DBE by Dr. Santos in [facility]  -aspirin/plavix per cardiology  -No GI objection to discharge when H&h stable  D/w Dr. Choe
Mr Bryant is a 84 yo man with hx afib (s/p Watchman last week), multiple GI bleeds requiring multiple transfusions (11 last year),  CAD s/p two stents 2015, DM2 (on oral hyperglycemic medications) presents with melena s/p one unit, Hgb stable overnight but 8.7 this AM.
Mr Bryant is a 82 yo man with hx afib (s/p Watchman last week), multiple GI bleeds requiring multiple transfusions (11 last year),  CAD s/p two stents 2015, DM2 (on oral hyperglycemic medications) presents with melena s/p one unit.

## 2018-07-26 NOTE — PROGRESS NOTE ADULT - SUBJECTIVE AND OBJECTIVE BOX
SUBJECTIVE: no new complaints. One dark brown bowel movement yesterday. No fever, chills, nausea, emesis. tolerating po    MEDICATIONS  (STANDING):  aspirin enteric coated 81 milliGRAM(s) Oral daily  atorvastatin 20 milliGRAM(s) Oral at bedtime  carvedilol 3.125 milliGRAM(s) Oral every 12 hours  cefTRIAXone   IVPB 1 Gram(s) IV Intermittent every 24 hours  clopidogrel Tablet 75 milliGRAM(s) Oral daily  dextrose 5%. 1000 milliLiter(s) (50 mL/Hr) IV Continuous <Continuous>  dextrose 50% Injectable 12.5 Gram(s) IV Push once  dextrose 50% Injectable 25 Gram(s) IV Push once  dextrose 50% Injectable 25 Gram(s) IV Push once  diltiazem    Tablet 45 milliGRAM(s) Oral every 6 hours  finasteride 5 milliGRAM(s) Oral daily  insulin lispro (HumaLOG) corrective regimen sliding scale   SubCutaneous three times a day before meals  octreotide  Injectable 50 MICROGram(s) SubCutaneous two times a day  pantoprazole  Injectable 40 milliGRAM(s) IV Push every 12 hours  tamsulosin 0.4 milliGRAM(s) Oral at bedtime    MEDICATIONS  (PRN):  dextrose 40% Gel 15 Gram(s) Oral once PRN Blood Glucose LESS THAN 70 milliGRAM(s)/deciliter  glucagon  Injectable 1 milliGRAM(s) IntraMuscular once PRN Glucose LESS THAN 70 milligrams/deciliter      OBJECTIVE:  Vital Signs Last 24 Hrs  T(C): 36.6 (2018 05:05), Max: 36.6 (2018 05:05)  T(F): 97.8 (2018 05:05), Max: 97.8 (2018 05:05)  HR: 75 (2018 09:12) (70 - 90)  BP: 123/68 (2018 09:12) (123/68 - 136/73)  BP(mean): --  RR: 18 (2018 05:05) (16 - 19)  SpO2: 99% (2018 05:05) (98% - 100%)    PHYSICAL EXAM:  Constitutional: A&OX3, in NAD,   HEENT: NCAT, no scleral icterus, no palpable LAD  Cardiovascular: RRR, S1 and S2, no audible murmurs  Respiratory: CTAB   Gastrointestinal: soft, NTND, normoactive bowel sounds, no palpable HSM  Extremities: No peripheral edema b/l     LABS:                        8.7    9.0   )-----------( 130      ( 2018 06:31 )             27.9         140  |  105  |  81<H>  ----------------------------<  189<H>  4.3   |  23  |  1.91<H>    Ca    8.8      2018 06:31  Phos  3.3       Mg     2.0         TPro  7.7  /  Alb  4.0  /  TBili  0.5  /  DBili  x   /  AST  64<H>  /  ALT  63<H>  /  AlkPhos  587<H>      PT/INR - ( 2018 06:47 )   PT: 13.0 sec;   INR: 1.19 ratio         PTT - ( 2018 06:47 )  PTT:31.5 sec  Urinalysis Basic - ( 2018 10:31 )    Color: Yellow / Appearance: SL Turbid / S.015 / pH: x  Gluc: x / Ketone: Negative  / Bili: Negative / Urobili: Negative   Blood: x / Protein: 30 mg/dL / Nitrite: Negative   Leuk Esterase: Large / RBC: 3-5 /HPF / WBC >50 /HPF   Sq Epi: x / Non Sq Epi: OCC /HPF / Bacteria: x      LIVER FUNCTIONS - ( 2018 10:31 )  Alb: 4.0 g/dL / Pro: 7.7 g/dL / ALK PHOS: 587 U/L / ALT: 63 U/L / AST: 64 U/L / GGT: x             RADIOLOGY & ADDITIONAL TESTS:

## 2018-07-26 NOTE — PROGRESS NOTE ADULT - ATTENDING COMMENTS
monitor cbc q12hr  c/w aspirin plavix  follow up urine culture  f/u gi recs
repeat cbc in afternoon, will touch base w gi if stable for possible d/c later today as patient insistent on leaving  c/w asp plavix  octeotride 50mcg bid  basal/bolus insulin if staying today

## 2018-07-26 NOTE — PROGRESS NOTE ADULT - SUBJECTIVE AND OBJECTIVE BOX
Patient is a 83y old  Male who presents with a chief complaint of Anemia (2018 15:03)      INTERVAL HPI/OVERNIGHT EVENTS: No acute events overnight. Patient reports that he feels well. Hgb dropped to 8.7 this AM. Patient denies lightheadedness/dizziness.    T(C): 36.6 (18 @ 05:05), Max: 36.6 (18 @ 05:05)  HR: 77 (18 @ 05:05) (64 - 90)  BP: 131/72 (18 @ 05:05) (126/64 - 136/73)  RR: 18 (18 @ 05:05) (16 - 19)  SpO2: 99% (18 @ 05:05) (98% - 100%)  Wt(kg): --  I&O's Summary    2018 07:01  -  2018 07:00  --------------------------------------------------------  IN: 960 mL / OUT: 200 mL / NET: 760 mL        LABS:                        8.7    9.0   )-----------( 130      ( 2018 06:31 )             27.9         140  |  105  |  81<H>  ----------------------------<  189<H>  4.3   |  23  |  1.91<H>    Ca    8.8      2018 06:31  Phos  3.3       Mg     2.0         TPro  7.7  /  Alb  4.0  /  TBili  0.5  /  DBili  x   /  AST  64<H>  /  ALT  63<H>  /  AlkPhos  587<H>  24    PT/INR - ( 2018 06:47 )   PT: 13.0 sec;   INR: 1.19 ratio         PTT - ( 2018 06:47 )  PTT:31.5 sec  Urinalysis Basic - ( 2018 10:31 )    Color: Yellow / Appearance: SL Turbid / S.015 / pH: x  Gluc: x / Ketone: Negative  / Bili: Negative / Urobili: Negative   Blood: x / Protein: 30 mg/dL / Nitrite: Negative   Leuk Esterase: Large / RBC: 3-5 /HPF / WBC >50 /HPF   Sq Epi: x / Non Sq Epi: OCC /HPF / Bacteria: x      CAPILLARY BLOOD GLUCOSE      POCT Blood Glucose.: 220 mg/dL (2018 21:33)  POCT Blood Glucose.: 223 mg/dL (2018 17:57)  POCT Blood Glucose.: 296 mg/dL (2018 12:49)  POCT Blood Glucose.: 219 mg/dL (2018 08:54)        Urinalysis Basic - ( 2018 10:31 )    Color: Yellow / Appearance: SL Turbid / S.015 / pH: x  Gluc: x / Ketone: Negative  / Bili: Negative / Urobili: Negative   Blood: x / Protein: 30 mg/dL / Nitrite: Negative   Leuk Esterase: Large / RBC: 3-5 /HPF / WBC >50 /HPF   Sq Epi: x / Non Sq Epi: OCC /HPF / Bacteria: x      REVIEW OF SYSTEMS:  CONSTITUTIONAL: No weakness, fevers or chills  EYES/ENT: No visual changes;  No vertigo or throat pain   NECK: No pain or stiffness  RESPIRATORY: No cough, wheezing, hemoptysis; No shortness of breath  CARDIOVASCULAR: No chest pain or palpitations  GASTROINTESTINAL: No abdominal or epigastric pain. No nausea, vomiting, or hematemesis; No diarrhea or constipation. No melena or hematochezia.  GENITOURINARY: No dysuria, frequency or hematuria  NEUROLOGICAL: No numbness or weakness  SKIN: No itching, rashes      RADIOLOGY & ADDITIONAL TESTS:    Imaging Personally Reviewed:  [ ] YES  [x] NO    Consultant(s) Notes Reviewed:  [x] YES  [ ] NO, GI    PHYSICAL EXAM:  GENERAL: NAD, well-groomed, well-developed  HEAD:  Atraumatic, Normocephalic  EYES: EOMI, PERRLA, conjunctiva and sclera clear  ENMT: No tonsillar erythema, exudates, or enlargement; Moist mucous membranes, Good dentition, No lesions  NECK: Supple, Normal thyroid  NERVOUS SYSTEM:  Alert & Oriented X3, Good concentration; Motor Strength 5/5 B/L upper and lower extremities  CHEST/LUNG: Clear to auscultation bilaterally; No rales, rhonchi, wheezing, or rubs  HEART: Regular rate and rhythm; No murmurs, rubs, or gallops  ABDOMEN: Soft, Nontender, Nondistended; Bowel sounds present  EXTREMITIES:  2+ Peripheral Pulses, No clubbing, cyanosis, or edema  LYMPH: No lymphadenopathy noted  SKIN: No rashes or lesions    Care Discussed with Consultants/Other Providers [x] YES  [ ] NO, GI

## 2018-07-27 RX ORDER — CEPHALEXIN 500 MG
1 CAPSULE ORAL
Qty: 8 | Refills: 0
Start: 2018-07-27 | End: 2018-07-30

## 2018-08-14 ENCOUNTER — APPOINTMENT (OUTPATIENT)
Dept: INTERNAL MEDICINE | Facility: CLINIC | Age: 83
End: 2018-08-14
Payer: MEDICARE

## 2018-08-14 VITALS
DIASTOLIC BLOOD PRESSURE: 70 MMHG | HEART RATE: 78 BPM | SYSTOLIC BLOOD PRESSURE: 130 MMHG | BODY MASS INDEX: 32.5 KG/M2 | WEIGHT: 227 LBS | HEIGHT: 70 IN | RESPIRATION RATE: 16 BRPM

## 2018-08-14 PROCEDURE — 99214 OFFICE O/P EST MOD 30 MIN: CPT

## 2018-08-14 RX ORDER — CEPHALEXIN 500 MG/1
500 CAPSULE ORAL
Qty: 8 | Refills: 0 | Status: DISCONTINUED | COMMUNITY
Start: 2018-07-26

## 2018-08-14 NOTE — PHYSICAL EXAM
[No Acute Distress] : no acute distress [Well Nourished] : well nourished [Well Developed] : well developed [Well-Appearing] : well-appearing [Normal Sclera/Conjunctiva] : normal sclera/conjunctiva [Normal Oropharynx] : the oropharynx was normal [No JVD] : no jugular venous distention [Supple] : supple [No Lymphadenopathy] : no lymphadenopathy [No Respiratory Distress] : no respiratory distress  [Clear to Auscultation] : lungs were clear to auscultation bilaterally [Normal S1, S2] : normal S1 and S2 [Irregularly Irregular] : irregularly irregular [Soft] : abdomen soft [Non Tender] : non-tender [No HSM] : no HSM [Normal Supraclavicular Nodes] : no supraclavicular lymphadenopathy [Normal Posterior Cervical Nodes] : no posterior cervical lymphadenopathy [Normal Anterior Cervical Nodes] : no anterior cervical lymphadenopathy [de-identified] : 2+ edema bilaterally

## 2018-08-14 NOTE — REVIEW OF SYSTEMS
[Shortness Of Breath] : shortness of breath [Negative] : Constitutional [Chest Pain] : no chest pain [FreeTextEntry7] : see HPI

## 2018-08-14 NOTE — HISTORY OF PRESENT ILLNESS
[FreeTextEntry1] : FU for edema, GI bleeding, off of asa and plavix - as per cardiology.\par Hb slowly coming up - did require 3 units PRBC\par On Octreotide BID\par Bowels light brown\par

## 2018-08-14 NOTE — ASSESSMENT
[FreeTextEntry1] : Pt with recurrent edema - off of diuretics - to restart Metolazone -2.5 mg per day\par Off of ASA and Plavix\par Continue other meds\par FU 1 week  -will need to check CBC and CMP\par To check sugars at home

## 2018-08-15 ENCOUNTER — APPOINTMENT (OUTPATIENT)
Dept: UROLOGY | Facility: CLINIC | Age: 83
End: 2018-08-15

## 2018-08-20 ENCOUNTER — LABORATORY RESULT (OUTPATIENT)
Age: 83
End: 2018-08-20

## 2018-08-20 ENCOUNTER — APPOINTMENT (OUTPATIENT)
Dept: INTERNAL MEDICINE | Facility: CLINIC | Age: 83
End: 2018-08-20
Payer: MEDICARE

## 2018-08-20 LAB
ALBUMIN SERPL ELPH-MCNC: 3.8 G/DL
ALP BLD-CCNC: 464 U/L
ALT SERPL-CCNC: 32 U/L
ANION GAP SERPL CALC-SCNC: 15 MMOL/L
AST SERPL-CCNC: 31 U/L
BASOPHILS # BLD AUTO: 0.01 K/UL
BASOPHILS NFR BLD AUTO: 0.2 %
BILIRUB SERPL-MCNC: 0.8 MG/DL
BUN SERPL-MCNC: 27 MG/DL
CALCIUM SERPL-MCNC: 8.9 MG/DL
CHLORIDE SERPL-SCNC: 100 MMOL/L
CHOLEST SERPL-MCNC: 87 MG/DL
CHOLEST/HDLC SERPL: 2.6 RATIO
CO2 SERPL-SCNC: 28 MMOL/L
CREAT SERPL-MCNC: 1.59 MG/DL
EOSINOPHIL # BLD AUTO: 0.1 K/UL
EOSINOPHIL NFR BLD AUTO: 1.8 %
GLUCOSE SERPL-MCNC: 235 MG/DL
HCT VFR BLD CALC: 30.7 %
HDLC SERPL-MCNC: 34 MG/DL
HGB BLD-MCNC: 9.6 G/DL
IMM GRANULOCYTES NFR BLD AUTO: 0.4 %
LDLC SERPL CALC-MCNC: 34 MG/DL
LYMPHOCYTES # BLD AUTO: 0.34 K/UL
LYMPHOCYTES NFR BLD AUTO: 6.1 %
MAN DIFF?: NORMAL
MCHC RBC-ENTMCNC: 27.9 PG
MCHC RBC-ENTMCNC: 31.3 GM/DL
MCV RBC AUTO: 89.2 FL
MONOCYTES # BLD AUTO: 0.46 K/UL
MONOCYTES NFR BLD AUTO: 8.3 %
NEUTROPHILS # BLD AUTO: 4.61 K/UL
NEUTROPHILS NFR BLD AUTO: 83.2 %
PLATELET # BLD AUTO: 102 K/UL
POTASSIUM SERPL-SCNC: 4.1 MMOL/L
PROT SERPL-MCNC: 6.9 G/DL
RBC # BLD: 3.44 M/UL
RBC # FLD: 16.5 %
SODIUM SERPL-SCNC: 143 MMOL/L
TRIGL SERPL-MCNC: 93 MG/DL
WBC # FLD AUTO: 5.54 K/UL

## 2018-08-20 PROCEDURE — 36415 COLL VENOUS BLD VENIPUNCTURE: CPT

## 2018-08-21 ENCOUNTER — APPOINTMENT (OUTPATIENT)
Dept: INTERNAL MEDICINE | Facility: CLINIC | Age: 83
End: 2018-08-21
Payer: MEDICARE

## 2018-08-21 VITALS
HEIGHT: 69.5 IN | SYSTOLIC BLOOD PRESSURE: 120 MMHG | RESPIRATION RATE: 16 BRPM | WEIGHT: 213 LBS | BODY MASS INDEX: 30.84 KG/M2 | DIASTOLIC BLOOD PRESSURE: 70 MMHG | HEART RATE: 78 BPM

## 2018-08-21 LAB — HBA1C MFR BLD HPLC: 6.1 %

## 2018-08-21 PROCEDURE — 99214 OFFICE O/P EST MOD 30 MIN: CPT

## 2018-08-21 NOTE — REVIEW OF SYSTEMS
[Shortness Of Breath] : shortness of breath [Negative] : Gastrointestinal [Chest Pain] : no chest pain

## 2018-08-21 NOTE — HISTORY OF PRESENT ILLNESS
[FreeTextEntry1] : FU for diabetes, GI bleeding, anemia, edema\par Doing better and weight down\par Sugars 154-251\par Watching sugars in diet\par

## 2018-08-21 NOTE — ASSESSMENT
[FreeTextEntry1] : Pt doing better\par Continue meds  -Labs reviewed\par Januvia increased to 50 mg per day\par FU with cardiology\par FU next week.

## 2018-08-21 NOTE — PHYSICAL EXAM
[No Acute Distress] : no acute distress [Well Nourished] : well nourished [Well Developed] : well developed [Well-Appearing] : well-appearing [Normal Sclera/Conjunctiva] : normal sclera/conjunctiva [Normal Oropharynx] : the oropharynx was normal [No JVD] : no jugular venous distention [Supple] : supple [No Lymphadenopathy] : no lymphadenopathy [No Respiratory Distress] : no respiratory distress  [Clear to Auscultation] : lungs were clear to auscultation bilaterally [No Accessory Muscle Use] : no accessory muscle use [Normal S1, S2] : normal S1 and S2 [Irregularly Irregular] : irregularly irregular [___+] : [unfilled]U+ pretibial pitting edema on the left [Soft] : abdomen soft [Non Tender] : non-tender [No HSM] : no HSM

## 2018-08-29 ENCOUNTER — APPOINTMENT (OUTPATIENT)
Dept: INTERNAL MEDICINE | Facility: CLINIC | Age: 83
End: 2018-08-29
Payer: MEDICARE

## 2018-08-29 ENCOUNTER — LABORATORY RESULT (OUTPATIENT)
Age: 83
End: 2018-08-29

## 2018-08-29 PROCEDURE — 36415 COLL VENOUS BLD VENIPUNCTURE: CPT

## 2018-08-30 ENCOUNTER — APPOINTMENT (OUTPATIENT)
Dept: INTERNAL MEDICINE | Facility: CLINIC | Age: 83
End: 2018-08-30
Payer: MEDICARE

## 2018-08-30 VITALS
RESPIRATION RATE: 16 BRPM | DIASTOLIC BLOOD PRESSURE: 65 MMHG | HEART RATE: 80 BPM | HEIGHT: 70 IN | BODY MASS INDEX: 29.78 KG/M2 | SYSTOLIC BLOOD PRESSURE: 130 MMHG | WEIGHT: 208 LBS

## 2018-08-30 LAB
ANION GAP SERPL CALC-SCNC: 15 MMOL/L
BASOPHILS # BLD AUTO: 0.01 K/UL
BASOPHILS NFR BLD AUTO: 0.1 %
BUN SERPL-MCNC: 36 MG/DL
CALCIUM SERPL-MCNC: 8.8 MG/DL
CHLORIDE SERPL-SCNC: 97 MMOL/L
CO2 SERPL-SCNC: 27 MMOL/L
CREAT SERPL-MCNC: 1.56 MG/DL
EOSINOPHIL # BLD AUTO: 0.08 K/UL
EOSINOPHIL NFR BLD AUTO: 1 %
GLUCOSE SERPL-MCNC: 226 MG/DL
HCT VFR BLD CALC: 31.5 %
HGB BLD-MCNC: 9.4 G/DL
IMM GRANULOCYTES NFR BLD AUTO: 0.4 %
LYMPHOCYTES # BLD AUTO: 0.36 K/UL
LYMPHOCYTES NFR BLD AUTO: 4.6 %
MAN DIFF?: NORMAL
MCHC RBC-ENTMCNC: 26.8 PG
MCHC RBC-ENTMCNC: 29.8 GM/DL
MCV RBC AUTO: 89.7 FL
MONOCYTES # BLD AUTO: 0.79 K/UL
MONOCYTES NFR BLD AUTO: 10.2 %
NEUTROPHILS # BLD AUTO: 6.49 K/UL
NEUTROPHILS NFR BLD AUTO: 83.7 %
PLATELET # BLD AUTO: 118 K/UL
POTASSIUM SERPL-SCNC: 4.3 MMOL/L
RBC # BLD: 3.51 M/UL
RBC # FLD: 16 %
SODIUM SERPL-SCNC: 139 MMOL/L
WBC # FLD AUTO: 7.76 K/UL

## 2018-08-30 PROCEDURE — 99214 OFFICE O/P EST MOD 30 MIN: CPT

## 2018-08-30 NOTE — ASSESSMENT
[FreeTextEntry1] : Pt improving - to continue meds\par Labs reviewed \par Pt to fu with hematology.next week\par FU here in 2 weeks.

## 2018-08-30 NOTE — HISTORY OF PRESENT ILLNESS
[FreeTextEntry1] : FU for diabetes, GI bleeding, anemia, a-fib.\par BM's normal\par On higher Januvia  -sugars are coming down  -now in mid 100's\par Breathing OK\par Watching salt\par Weight coming down\par

## 2018-08-30 NOTE — PHYSICAL EXAM
[No Acute Distress] : no acute distress [Well Nourished] : well nourished [Well Developed] : well developed [Well-Appearing] : well-appearing [Normal Sclera/Conjunctiva] : normal sclera/conjunctiva [Normal Oropharynx] : the oropharynx was normal [No JVD] : no jugular venous distention [Supple] : supple [No Lymphadenopathy] : no lymphadenopathy [No Respiratory Distress] : no respiratory distress  [Clear to Auscultation] : lungs were clear to auscultation bilaterally [No Accessory Muscle Use] : no accessory muscle use [Normal S1, S2] : normal S1 and S2 [Irregularly Irregular] : irregularly irregular [II] : a grade 2 [Soft] : abdomen soft [Non Tender] : non-tender [Non-distended] : non-distended [No HSM] : no HSM [Normal Supraclavicular Nodes] : no supraclavicular lymphadenopathy [Normal Posterior Cervical Nodes] : no posterior cervical lymphadenopathy [Normal Anterior Cervical Nodes] : no anterior cervical lymphadenopathy [No Focal Deficits] : no focal deficits [Alert and Oriented x3] : oriented to person, place, and time

## 2018-09-13 ENCOUNTER — APPOINTMENT (OUTPATIENT)
Dept: INTERNAL MEDICINE | Facility: CLINIC | Age: 83
End: 2018-09-13
Payer: MEDICARE

## 2018-09-13 VITALS
HEART RATE: 76 BPM | RESPIRATION RATE: 16 BRPM | DIASTOLIC BLOOD PRESSURE: 70 MMHG | BODY MASS INDEX: 28.35 KG/M2 | SYSTOLIC BLOOD PRESSURE: 130 MMHG | WEIGHT: 198 LBS | HEIGHT: 70 IN

## 2018-09-13 PROCEDURE — 99214 OFFICE O/P EST MOD 30 MIN: CPT

## 2018-09-13 NOTE — HISTORY OF PRESENT ILLNESS
[FreeTextEntry1] : FU for diabetes, edema, a-fib\par Sugars are running in 200's - taking Januvia in PM\par Weight coming down\par Hb is 10

## 2018-09-13 NOTE — ASSESSMENT
[FreeTextEntry1] : Pt stable and weight coming down\par To move Januvia to AM  -may need to increase dose.\par Continue diuretics\par FU 2 weeks

## 2018-09-13 NOTE — PHYSICAL EXAM
[No Acute Distress] : no acute distress [Well Nourished] : well nourished [Well Developed] : well developed [Normal Sclera/Conjunctiva] : normal sclera/conjunctiva [Normal Oropharynx] : the oropharynx was normal [No JVD] : no jugular venous distention [Supple] : supple [No Lymphadenopathy] : no lymphadenopathy [No Respiratory Distress] : no respiratory distress  [Clear to Auscultation] : lungs were clear to auscultation bilaterally [Normal S1, S2] : normal S1 and S2 [Irregularly Irregular] : irregularly irregular [Soft] : abdomen soft [Non Tender] : non-tender [No HSM] : no HSM [Normal Supraclavicular Nodes] : no supraclavicular lymphadenopathy [Normal Posterior Cervical Nodes] : no posterior cervical lymphadenopathy [Normal Anterior Cervical Nodes] : no anterior cervical lymphadenopathy [No Focal Deficits] : no focal deficits [Alert and Oriented x3] : oriented to person, place, and time [de-identified] : tr-1+ edema bilaterally

## 2018-09-27 ENCOUNTER — APPOINTMENT (OUTPATIENT)
Dept: INTERNAL MEDICINE | Facility: CLINIC | Age: 83
End: 2018-09-27
Payer: MEDICARE

## 2018-09-27 VITALS
RESPIRATION RATE: 16 BRPM | HEART RATE: 70 BPM | BODY MASS INDEX: 28.2 KG/M2 | WEIGHT: 197 LBS | SYSTOLIC BLOOD PRESSURE: 120 MMHG | HEIGHT: 70 IN | DIASTOLIC BLOOD PRESSURE: 65 MMHG

## 2018-09-27 PROCEDURE — G0008: CPT

## 2018-09-27 PROCEDURE — 90662 IIV NO PRSV INCREASED AG IM: CPT

## 2018-09-27 PROCEDURE — 99214 OFFICE O/P EST MOD 30 MIN: CPT | Mod: 25

## 2018-09-27 NOTE — PHYSICAL EXAM
[No Acute Distress] : no acute distress [Well Nourished] : well nourished [Well Developed] : well developed [Well-Appearing] : well-appearing [Normal Sclera/Conjunctiva] : normal sclera/conjunctiva [Normal Oropharynx] : the oropharynx was normal [No JVD] : no jugular venous distention [Supple] : supple [No Lymphadenopathy] : no lymphadenopathy [No Respiratory Distress] : no respiratory distress  [Clear to Auscultation] : lungs were clear to auscultation bilaterally [No Accessory Muscle Use] : no accessory muscle use [Normal S1, S2] : normal S1 and S2 [Irregularly Irregular] : irregularly irregular [Soft] : abdomen soft [Non Tender] : non-tender [No HSM] : no HSM [de-identified] : tr edema bilaterally [de-identified] : Anxiety

## 2018-09-27 NOTE — HISTORY OF PRESENT ILLNESS
[FreeTextEntry1] : Feels very stressed out - moving  - selling house - \par Sugars still high  -low 200's\par Watching diet and walking\par FU for edema and afib also

## 2018-09-27 NOTE — ASSESSMENT
[FreeTextEntry1] : Pt stable but getting very stressed out  -will try Lexapro 5 mg per day\par Januvia increased to 100 mg per day\par FLU shot given\par FU 2 weeks

## 2018-10-12 ENCOUNTER — APPOINTMENT (OUTPATIENT)
Dept: INTERNAL MEDICINE | Facility: CLINIC | Age: 83
End: 2018-10-12
Payer: MEDICARE

## 2018-10-12 ENCOUNTER — RX RENEWAL (OUTPATIENT)
Age: 83
End: 2018-10-12

## 2018-10-12 VITALS
BODY MASS INDEX: 27.55 KG/M2 | HEART RATE: 76 BPM | RESPIRATION RATE: 16 BRPM | DIASTOLIC BLOOD PRESSURE: 70 MMHG | WEIGHT: 192 LBS | SYSTOLIC BLOOD PRESSURE: 115 MMHG

## 2018-10-12 DIAGNOSIS — I48.0 PAROXYSMAL ATRIAL FIBRILLATION: ICD-10-CM

## 2018-10-12 PROCEDURE — 99214 OFFICE O/P EST MOD 30 MIN: CPT

## 2018-10-12 RX ORDER — FINASTERIDE 5 MG/1
5 TABLET, FILM COATED ORAL
Refills: 1 | Status: DISCONTINUED | COMMUNITY
Start: 2018-07-23 | End: 2018-10-12

## 2018-10-12 RX ORDER — ESCITALOPRAM OXALATE 5 MG/1
5 TABLET ORAL DAILY
Qty: 30 | Refills: 2 | Status: DISCONTINUED | COMMUNITY
Start: 2018-09-27 | End: 2018-10-12

## 2018-10-12 NOTE — ASSESSMENT
[FreeTextEntry1] : Pt doing well - \par Will try to cut Metolazone - to qod  -if weight goes up will go back to every day.\par Continue other meds\par FU 3 weeks.

## 2018-10-12 NOTE — HISTORY OF PRESENT ILLNESS
[FreeTextEntry1] : FU for diabetes  -last few days sugars have been better  -< 200.\par Weight down\par Being better on diet\par Walking better\par Some SOB\par Appetite OK\par FU for afib, GI bleeding  -normal BM's

## 2018-10-12 NOTE — PHYSICAL EXAM
[No Acute Distress] : no acute distress [Well Nourished] : well nourished [Well Developed] : well developed [Normal Sclera/Conjunctiva] : normal sclera/conjunctiva [Normal Oropharynx] : the oropharynx was normal [No JVD] : no jugular venous distention [Supple] : supple [No Lymphadenopathy] : no lymphadenopathy [No Respiratory Distress] : no respiratory distress  [Clear to Auscultation] : lungs were clear to auscultation bilaterally [No Accessory Muscle Use] : no accessory muscle use [Normal S1, S2] : normal S1 and S2 [Irregularly Irregular] : irregularly irregular [Soft] : abdomen soft [Non Tender] : non-tender [No HSM] : no HSM [Normal Supraclavicular Nodes] : no supraclavicular lymphadenopathy [Normal Posterior Cervical Nodes] : no posterior cervical lymphadenopathy [Normal Anterior Cervical Nodes] : no anterior cervical lymphadenopathy [de-identified] : trace ken on left

## 2018-11-02 ENCOUNTER — APPOINTMENT (OUTPATIENT)
Dept: INTERNAL MEDICINE | Facility: CLINIC | Age: 83
End: 2018-11-02
Payer: MEDICARE

## 2018-11-02 VITALS
HEART RATE: 76 BPM | SYSTOLIC BLOOD PRESSURE: 120 MMHG | BODY MASS INDEX: 28.12 KG/M2 | RESPIRATION RATE: 16 BRPM | WEIGHT: 196 LBS | DIASTOLIC BLOOD PRESSURE: 60 MMHG

## 2018-11-02 PROCEDURE — 36415 COLL VENOUS BLD VENIPUNCTURE: CPT

## 2018-11-02 PROCEDURE — 99214 OFFICE O/P EST MOD 30 MIN: CPT | Mod: 25

## 2018-11-02 RX ORDER — TAMSULOSIN HYDROCHLORIDE 0.4 MG/1
0.4 CAPSULE ORAL
Qty: 90 | Refills: 2 | Status: DISCONTINUED | COMMUNITY
Start: 2018-07-23 | End: 2018-11-02

## 2018-11-02 NOTE — HISTORY OF PRESENT ILLNESS
[FreeTextEntry1] : FU for diabetes, edema, cardiomyopathy\par Weight stable\par Sugars - in 140's to low 200's\par Breathing OK\par Staying away from sugar\par Not walking.\par

## 2018-11-02 NOTE — PHYSICAL EXAM
[No Acute Distress] : no acute distress [Well Nourished] : well nourished [Well Developed] : well developed [Well-Appearing] : well-appearing [Normal Sclera/Conjunctiva] : normal sclera/conjunctiva [Normal Oropharynx] : the oropharynx was normal [No JVD] : no jugular venous distention [Supple] : supple [No Lymphadenopathy] : no lymphadenopathy [Clear to Auscultation] : lungs were clear to auscultation bilaterally [No Accessory Muscle Use] : no accessory muscle use [Normal S1, S2] : normal S1 and S2 [Irregularly Irregular] : irregularly irregular [Soft] : abdomen soft [Non Tender] : non-tender [Non-distended] : non-distended [No HSM] : no HSM [Normal Supraclavicular Nodes] : no supraclavicular lymphadenopathy [Normal Posterior Cervical Nodes] : no posterior cervical lymphadenopathy [Normal Anterior Cervical Nodes] : no anterior cervical lymphadenopathy [No CVA Tenderness] : no CVA  tenderness [No Spinal Tenderness] : no spinal tenderness [Alert and Oriented x3] : oriented to person, place, and time [de-identified] : trace edema bilaterally

## 2018-11-02 NOTE — ASSESSMENT
[FreeTextEntry1] : Pt stable but sugars elevated and may need insulin.\par Labs sent\par Continue other meds\par

## 2018-11-03 LAB
CHOLEST SERPL-MCNC: 113 MG/DL
CHOLEST/HDLC SERPL: 3.4 RATIO
HBA1C MFR BLD HPLC: 7.4 %
HDLC SERPL-MCNC: 33 MG/DL
LDLC SERPL CALC-MCNC: 57 MG/DL
TRIGL SERPL-MCNC: 117 MG/DL

## 2018-11-09 ENCOUNTER — APPOINTMENT (OUTPATIENT)
Dept: CARDIOLOGY | Facility: CLINIC | Age: 83
End: 2018-11-09
Payer: MEDICARE

## 2018-11-09 ENCOUNTER — NON-APPOINTMENT (OUTPATIENT)
Age: 83
End: 2018-11-09

## 2018-11-09 VITALS
HEIGHT: 70 IN | SYSTOLIC BLOOD PRESSURE: 134 MMHG | DIASTOLIC BLOOD PRESSURE: 78 MMHG | BODY MASS INDEX: 27.92 KG/M2 | WEIGHT: 195 LBS | HEART RATE: 77 BPM

## 2018-11-09 DIAGNOSIS — I45.10 UNSPECIFIED RIGHT BUNDLE-BRANCH BLOCK: ICD-10-CM

## 2018-11-09 PROCEDURE — 93000 ELECTROCARDIOGRAM COMPLETE: CPT

## 2018-11-09 PROCEDURE — 99215 OFFICE O/P EST HI 40 MIN: CPT

## 2018-11-09 NOTE — HISTORY OF PRESENT ILLNESS
[FreeTextEntry1] : Mr. Maurice Bryant is a 83-year-old man with coronary artery disease, coronary stents, atrial fibrillation. Has had ongoing management of coronary artery disease with a remote myocardial infarction. Has drug-eluting stents to proximal left anterior descending artery and first diagonal. Typical angina symptoms completely resolved.  Also, has had chronic anemia and hematologic and gastrointestinal evaluation. Anticoagulation with warfarin necessarily stopped. Clopidogrel also stopped and remains on aspirin. He denies chest pain, dyspnea or palpitations.\par \par Had ongoing transfusion requirement, but after another double lumen enteroscopy and started on injections of octreotide hemoglobin has stabilized, no transfusions all winter. Remains off anticoagulation. Evaluated by Dr Laci Trevino randomized to trial and Watchman device placed. Was on triple therapy after device, but then had another GI bleed and subsequently on no antiplatelet or anticoagulation.

## 2018-11-09 NOTE — CARDIOLOGY SUMMARY
[No Ischemia] : no Ischemia [No Exercise Ind Arr] : no exercise induced arrhythmias [Inf Wall Defect] : inferior wall defect [Fixed Defect] : fixed defect [___] : [unfilled] [LVEF ___%] : LVEF [unfilled]% [Enlarged] : enlarged LA size [Mild] : mild mitral regurgitation [___] : [unfilled]

## 2018-11-09 NOTE — PHYSICAL EXAM
[General Appearance - Well Developed] : well developed [Normal Appearance] : normal appearance [Well Groomed] : well groomed [General Appearance - Well Nourished] : well nourished [No Deformities] : no deformities [General Appearance - In No Acute Distress] : no acute distress [Normal Conjunctiva] : the conjunctiva exhibited no abnormalities [Eyelids - No Xanthelasma] : the eyelids demonstrated no xanthelasmas [Normal Oral Mucosa] : normal oral mucosa [No Oral Pallor] : no oral pallor [No Oral Cyanosis] : no oral cyanosis [JVD Elevated _____cm] : JVD elevated [unfilled] ~U cm above clavicle [Normal Jugular Venous V Waves Present] : normal jugular venous V waves present [No Jugular Venous Vogel A Waves] : no jugular venous vogel A waves [Respiration, Rhythm And Depth] : normal respiratory rhythm and effort [Exaggerated Use Of Accessory Muscles For Inspiration] : no accessory muscle use [Auscultation Breath Sounds / Voice Sounds] : lungs were clear to auscultation bilaterally [Not Palpable] : not palpable [Normal Rate] : normal [Irregularly Irregular] : irregularly irregular [Normal S1] : normal S1 [Normal S2] : normal S2 [No Gallop] : no gallop heard [No Murmur] : no murmurs heard [2+] : left 2+ [No Abnormalities] : the abdominal aorta was not enlarged and no bruit was heard [No Pitting Edema] : no pitting edema present [Abdomen Soft] : soft [Abdomen Tenderness] : non-tender [Abdomen Mass (___ Cm)] : no abdominal mass palpated [Abnormal Walk] : normal gait [Nail Clubbing] : no clubbing of the fingernails [Cyanosis, Localized] : no localized cyanosis [Petechial Hemorrhages (___cm)] : no petechial hemorrhages [Skin Color & Pigmentation] : normal skin color and pigmentation [] : no rash [No Skin Ulcers] : no skin ulcer [No Xanthoma] : no  xanthoma was observed [Oriented To Time, Place, And Person] : oriented to person, place, and time [Affect] : the affect was normal [Mood] : the mood was normal [No Anxiety] : not feeling anxious [Click] : no click [Right Carotid Bruit] : no bruit heard over the right carotid [Left Carotid Bruit] : no bruit heard over the left carotid [FreeTextEntry1] : chronic lower extremity edema and stasis changes

## 2018-11-09 NOTE — REASON FOR VISIT
[Follow-Up - Clinic] : a clinic follow-up of [Atrial Fibrillation] : atrial fibrillation [Coronary Artery Disease] : coronary artery disease

## 2018-11-09 NOTE — REVIEW OF SYSTEMS
[Lower Ext Edema] : lower extremity edema [Nocturia] : nocturia [Dizziness] : dizziness [Easy Bruising] : a tendency for easy bruising [Negative] : Heme/Lymph [Fever] : no fever [Chills] : no chills [Shortness Of Breath] : no shortness of breath [Dyspnea on exertion] : not dyspnea during exertion [Chest Pain] : no chest pain [Leg Claudication] : no intermittent leg claudication [Palpitations] : no palpitations

## 2018-11-09 NOTE — DISCUSSION/SUMMARY
[Bundle Branch Block] : ~T bundle branch block [Permanent Atrial Fibrillation] : permanent atrial fibrillation [Rate Control] : rate control [Coronary Artery Disease] : coronary artery disease [Stable] : stable [None] : none [Patient] : the patient [de-identified] : has been off clopidogrel and should never resume, however is off aspirin and with coronary stent this seems incorrect and should be resumed.

## 2018-12-04 ENCOUNTER — RX RENEWAL (OUTPATIENT)
Age: 83
End: 2018-12-04

## 2019-01-04 RX ORDER — SITAGLIPTIN 100 MG/1
100 TABLET, FILM COATED ORAL
Qty: 90 | Refills: 3 | Status: DISCONTINUED | COMMUNITY
Start: 2017-10-30 | End: 2019-01-04

## 2019-02-10 NOTE — PATIENT PROFILE ADULT. - NS PRO CONTRA FLU 1
Telephone Encounter by Alcon Walker at 06/02/17 04:01 PM     Author:  Alcon Walker Service:  (none) Author Type:       Filed:  06/02/17 04:03 PM Encounter Date:  6/2/2017 Status:  Signed     :  Alcon Walker (Patient )              KYUNG OCONNOR    Patient Age: 6 month old    ACCT STATUS:   MESSAGE:[JE1.1T] Mom states Kyung is not sleeping well.  She is teething and mom would like to know what she can do for her.[JE1.1M]  Please advise.  Message confirmed with caller.     Next and Last Visit with Provider and Department  Next visit with NU AGUIRRE is on 08/16/2017 at 11:00 AM in [facility]  Next visit with [facility] is on 08/16/2017 at 11:00 AM in [facility]  Last visit with ONEL AGUIRREENE was on 05/17/2017 at 11:00 AM in [facility]  Last visit with [facility]EDIAT[facility] was on 05/17/2017 at 11:00 AM in [facility]     WEIGHT AND HEIGHT: As of 05/17/2017 weight is 12.8 lbs.(5.806 kg). Height is 2' 1.25\"(0.641 m).   BMI is 14.13 kg/(m^2) calculated from:     Height 2' 1.25\" (0.641 m) as of 5/17/17     Weight 12 lb 12.8 oz (5.806 kg) as of 5/17/17        CALL BACK INFO:[JE1.1T] Ok to leave response (including medical information) with family member or on answering machine[JE1.1M]  ROUTING:[JE1.1T] Patient's physician/staff[JE1.1M]        PCP: Nu Aguirre MD         INS: Payor: [employer] PPO / Plan: *No Plan* / Product Type: *No Product type* / Note: This is the primary coverage, but no account was found for this location or the patient's primary location.   ADDRESS:  [address] [place] [id number][JE1.1T]         Revision History        User Key Date/Time User Provider Type Action    > JE1.1 06/02/17 04:03 PM Alcon Walker Patient  Sign    M - Manual, T - Template             out of season (available sept 1 thru apr 2 only)

## 2019-03-05 ENCOUNTER — RX RENEWAL (OUTPATIENT)
Age: 84
End: 2019-03-05

## 2019-03-06 ENCOUNTER — MEDICATION RENEWAL (OUTPATIENT)
Age: 84
End: 2019-03-06

## 2019-03-15 ENCOUNTER — RX CHANGE (OUTPATIENT)
Age: 84
End: 2019-03-15

## 2019-06-03 ENCOUNTER — APPOINTMENT (OUTPATIENT)
Dept: INTERNAL MEDICINE | Facility: CLINIC | Age: 84
End: 2019-06-03

## 2019-06-13 ENCOUNTER — APPOINTMENT (OUTPATIENT)
Dept: INTERNAL MEDICINE | Facility: CLINIC | Age: 84
End: 2019-06-13
Payer: MEDICARE

## 2019-06-13 VITALS — HEART RATE: 78 BPM | DIASTOLIC BLOOD PRESSURE: 80 MMHG | RESPIRATION RATE: 14 BRPM | SYSTOLIC BLOOD PRESSURE: 138 MMHG

## 2019-06-13 VITALS — HEIGHT: 70 IN | WEIGHT: 205 LBS | BODY MASS INDEX: 29.35 KG/M2

## 2019-06-13 DIAGNOSIS — E78.5 TYPE 2 DIABETES MELLITUS WITH OTHER SPECIFIED COMPLICATION: ICD-10-CM

## 2019-06-13 DIAGNOSIS — E11.69 TYPE 2 DIABETES MELLITUS WITH OTHER SPECIFIED COMPLICATION: ICD-10-CM

## 2019-06-13 PROCEDURE — 99214 OFFICE O/P EST MOD 30 MIN: CPT

## 2019-06-13 NOTE — HISTORY OF PRESENT ILLNESS
[de-identified] : Pt presents for f/u evaluation of DM, edema, cardiomyopathy.\par Was anemic in [place] - was receiving Retacrit.\par Sees Dr Dave here - got injection yesterday.\par Breathing stable.\par No change in swelling.\par Has gained a few pounds while in [place]. still taking meds as directed.

## 2019-06-13 NOTE — PHYSICAL EXAM
[No Acute Distress] : no acute distress [Normal Oropharynx] : the oropharynx was normal [Supple] : supple [No Lymphadenopathy] : no lymphadenopathy [Clear to Auscultation] : lungs were clear to auscultation bilaterally [No Respiratory Distress] : no respiratory distress  [Normal S1, S2] : normal S1 and S2 [Normal Rate] : normal rate  [Soft] : abdomen soft [Irregularly Irregular] : irregularly irregular [Non Tender] : non-tender [Non-distended] : non-distended [Normal Bowel Sounds] : normal bowel sounds

## 2019-06-13 NOTE — ASSESSMENT
[FreeTextEntry1] : Continue all meds\par \par F/U with Hematology\par \par F/U OV as scheduled early July

## 2019-07-06 ENCOUNTER — APPOINTMENT (OUTPATIENT)
Dept: INTERNAL MEDICINE | Facility: CLINIC | Age: 84
End: 2019-07-06
Payer: MEDICARE

## 2019-07-06 VITALS
RESPIRATION RATE: 16 BRPM | SYSTOLIC BLOOD PRESSURE: 130 MMHG | WEIGHT: 201 LBS | HEART RATE: 72 BPM | HEIGHT: 69 IN | DIASTOLIC BLOOD PRESSURE: 70 MMHG | BODY MASS INDEX: 29.77 KG/M2

## 2019-07-06 DIAGNOSIS — F41.9 ANXIETY DISORDER, UNSPECIFIED: ICD-10-CM

## 2019-07-06 DIAGNOSIS — N18.3 CHRONIC KIDNEY DISEASE, STAGE 3 (MODERATE): ICD-10-CM

## 2019-07-06 DIAGNOSIS — F32.9 ANXIETY DISORDER, UNSPECIFIED: ICD-10-CM

## 2019-07-06 PROCEDURE — 99214 OFFICE O/P EST MOD 30 MIN: CPT

## 2019-07-06 RX ORDER — TAMSULOSIN HYDROCHLORIDE 0.4 MG/1
0.4 CAPSULE ORAL
Qty: 30 | Refills: 3 | Status: DISCONTINUED | COMMUNITY
Start: 2018-07-18 | End: 2019-07-06

## 2019-07-06 RX ORDER — PANTOPRAZOLE 40 MG/1
40 TABLET, DELAYED RELEASE ORAL
Qty: 180 | Refills: 2 | Status: DISCONTINUED | COMMUNITY
Start: 2018-07-23 | End: 2019-07-06

## 2019-07-06 RX ORDER — OCTREOTIDE ACETATE 50 UG/ML
50 INJECTION, SOLUTION INTRAVENOUS; SUBCUTANEOUS
Refills: 0 | Status: DISCONTINUED | COMMUNITY
Start: 2018-05-01 | End: 2019-07-06

## 2019-07-06 RX ORDER — CLOPIDOGREL 75 MG/1
75 TABLET, FILM COATED ORAL
Refills: 2 | Status: DISCONTINUED | COMMUNITY
Start: 2018-07-23 | End: 2019-07-06

## 2019-07-06 RX ORDER — OMEPRAZOLE 20 MG/1
20 CAPSULE, DELAYED RELEASE ORAL
Qty: 90 | Refills: 0 | Status: DISCONTINUED | COMMUNITY
Start: 2019-03-27

## 2019-07-06 NOTE — ASSESSMENT
[FreeTextEntry1] : Pt with above medical issues.\par Januvia increased to 100 mg per day\par FU 4 weeks with sugar readings\par To be better with diet

## 2019-07-06 NOTE — PHYSICAL EXAM
[Well Nourished] : well nourished [No Acute Distress] : no acute distress [Well Developed] : well developed [Well-Appearing] : well-appearing [Normal Sclera/Conjunctiva] : normal sclera/conjunctiva [No Lymphadenopathy] : no lymphadenopathy [Normal Oropharynx] : the oropharynx was normal [Supple] : supple [Normal] : no respiratory distress, lungs were clear to auscultation bilaterally and no accessory muscle use [Irregularly Irregular] : irregularly irregular [Normal S1, S2] : normal S1 and S2 [Soft] : abdomen soft [No Masses] : no abdominal mass palpated [Non Tender] : non-tender [Non-distended] : non-distended [No HSM] : no HSM [Normal Supraclavicular Nodes] : no supraclavicular lymphadenopathy [Normal Posterior Cervical Nodes] : no posterior cervical lymphadenopathy [No CVA Tenderness] : no CVA  tenderness [Normal Anterior Cervical Nodes] : no anterior cervical lymphadenopathy [de-identified] : tr edema [No Spinal Tenderness] : no spinal tenderness

## 2019-07-06 NOTE — HISTORY OF PRESENT ILLNESS
[FreeTextEntry1] : FU for cardiomyopathy, diabetes, afib, anemia, renal insufficiency \par Sugars have been high - in 200-300\par Weight stable\par Watching diet - \par Trying to walk.\par

## 2019-07-09 ENCOUNTER — RX RENEWAL (OUTPATIENT)
Age: 84
End: 2019-07-09

## 2019-07-11 ENCOUNTER — RX RENEWAL (OUTPATIENT)
Age: 84
End: 2019-07-11

## 2019-08-06 ENCOUNTER — RX RENEWAL (OUTPATIENT)
Age: 84
End: 2019-08-06

## 2019-08-08 ENCOUNTER — APPOINTMENT (OUTPATIENT)
Dept: INTERNAL MEDICINE | Facility: CLINIC | Age: 84
End: 2019-08-08
Payer: MEDICARE

## 2019-08-08 VITALS
DIASTOLIC BLOOD PRESSURE: 60 MMHG | HEART RATE: 72 BPM | SYSTOLIC BLOOD PRESSURE: 120 MMHG | RESPIRATION RATE: 18 BRPM | WEIGHT: 207 LBS | BODY MASS INDEX: 30.57 KG/M2

## 2019-08-08 DIAGNOSIS — D64.9 ANEMIA, UNSPECIFIED: ICD-10-CM

## 2019-08-08 DIAGNOSIS — K92.2 GASTROINTESTINAL HEMORRHAGE, UNSPECIFIED: ICD-10-CM

## 2019-08-08 PROCEDURE — 99214 OFFICE O/P EST MOD 30 MIN: CPT

## 2019-08-08 NOTE — REVIEW OF SYSTEMS
[Dyspnea on Exertion] : dyspnea on exertion [Chest Pain] : no chest pain [Negative] : Constitutional

## 2019-08-08 NOTE — PHYSICAL EXAM
[Normal Sclera/Conjunctiva] : normal sclera/conjunctiva [Normal Oropharynx] : the oropharynx was normal [No JVD] : no jugular venous distention [No Lymphadenopathy] : no lymphadenopathy [Supple] : supple [Normal] : soft, non-tender, non-distended, no masses palpated, no HSM and normal bowel sounds [Irregularly Irregular] : irregularly irregular [Normal Supraclavicular Nodes] : no supraclavicular lymphadenopathy [Normal Anterior Cervical Nodes] : no anterior cervical lymphadenopathy [Normal Posterior Cervical Nodes] : no posterior cervical lymphadenopathy [de-identified] : trace - 1+ edema

## 2019-08-08 NOTE — ASSESSMENT
[FreeTextEntry1] : Pt with progressive diabetes\par To start Lantus 10 units in the evening.\par To see GI\par Likely needs Octreotide again\par FU 2 weeks with sugar readings.

## 2019-08-08 NOTE — HISTORY OF PRESENT ILLNESS
[FreeTextEntry1] : FU for diabetes  -sugars are in 200's\par Watching diet \par Back to GI bleeding - going to fu with GI  -benefitted from Octreotide in the past.\par Feels SOB\par Heme giving him iron\par

## 2019-08-22 ENCOUNTER — APPOINTMENT (OUTPATIENT)
Dept: INTERNAL MEDICINE | Facility: CLINIC | Age: 84
End: 2019-08-22
Payer: MEDICARE

## 2019-08-22 VITALS
WEIGHT: 206 LBS | SYSTOLIC BLOOD PRESSURE: 130 MMHG | DIASTOLIC BLOOD PRESSURE: 70 MMHG | BODY MASS INDEX: 30.51 KG/M2 | RESPIRATION RATE: 18 BRPM | HEART RATE: 76 BPM | HEIGHT: 69 IN

## 2019-08-22 DIAGNOSIS — I43 TYPE 2 DIABETES MELLITUS WITH OTHER CIRCULATORY COMPLICATIONS: ICD-10-CM

## 2019-08-22 DIAGNOSIS — E11.59 TYPE 2 DIABETES MELLITUS WITH OTHER CIRCULATORY COMPLICATIONS: ICD-10-CM

## 2019-08-22 PROCEDURE — 99214 OFFICE O/P EST MOD 30 MIN: CPT

## 2019-08-22 NOTE — PHYSICAL EXAM
[No Acute Distress] : no acute distress [Well Developed] : well developed [Normal Sclera/Conjunctiva] : normal sclera/conjunctiva [Normal Oropharynx] : the oropharynx was normal [No JVD] : no jugular venous distention [No Lymphadenopathy] : no lymphadenopathy [Supple] : supple [No Respiratory Distress] : no respiratory distress  [No Accessory Muscle Use] : no accessory muscle use [Normal S1, S2] : normal S1 and S2 [Clear to Auscultation] : lungs were clear to auscultation bilaterally [Irregularly Irregular] : irregularly irregular [Soft] : abdomen soft [Non Tender] : non-tender [de-identified] : trace edema bilaterally [No HSM] : no HSM

## 2019-08-22 NOTE — ASSESSMENT
[FreeTextEntry1] : Pt with above medical issues.\par Sugars are better - to continue Lantus\par FU in 1 month\par FU heme.

## 2019-08-22 NOTE — HISTORY OF PRESENT ILLNESS
[FreeTextEntry1] : FU for diabetes - sugars are better - down to 100's\par Hb is coming up.\par Better with diet\par Trying to walk\par Played golf OK\par

## 2019-09-06 ENCOUNTER — RX RENEWAL (OUTPATIENT)
Age: 84
End: 2019-09-06

## 2019-09-23 ENCOUNTER — APPOINTMENT (OUTPATIENT)
Dept: INTERNAL MEDICINE | Facility: CLINIC | Age: 84
End: 2019-09-23
Payer: MEDICARE

## 2019-09-23 VITALS
RESPIRATION RATE: 16 BRPM | SYSTOLIC BLOOD PRESSURE: 130 MMHG | HEART RATE: 78 BPM | BODY MASS INDEX: 31.01 KG/M2 | DIASTOLIC BLOOD PRESSURE: 60 MMHG | WEIGHT: 210 LBS

## 2019-09-23 DIAGNOSIS — I48.91 UNSPECIFIED ATRIAL FIBRILLATION: ICD-10-CM

## 2019-09-23 DIAGNOSIS — Z79.4 TYPE 2 DIABETES MELLITUS W/OUT COMPLICATIONS: ICD-10-CM

## 2019-09-23 DIAGNOSIS — I25.10 ATHEROSCLEROTIC HEART DISEASE OF NATIVE CORONARY ARTERY W/OUT ANGINA PECTORIS: ICD-10-CM

## 2019-09-23 DIAGNOSIS — E11.9 TYPE 2 DIABETES MELLITUS W/OUT COMPLICATIONS: ICD-10-CM

## 2019-09-23 DIAGNOSIS — R60.0 LOCALIZED EDEMA: ICD-10-CM

## 2019-09-23 PROCEDURE — G0008: CPT

## 2019-09-23 PROCEDURE — 99214 OFFICE O/P EST MOD 30 MIN: CPT | Mod: 25

## 2019-09-23 PROCEDURE — 90662 IIV NO PRSV INCREASED AG IM: CPT

## 2019-09-23 RX ORDER — ASPIRIN ENTERIC COATED TABLETS 81 MG 81 MG/1
81 TABLET, DELAYED RELEASE ORAL
Refills: 1 | Status: DISCONTINUED | COMMUNITY
Start: 2018-07-23 | End: 2019-09-23

## 2019-09-23 RX ORDER — LANCETS
EACH MISCELLANEOUS
Qty: 200 | Refills: 3 | Status: ACTIVE | COMMUNITY
Start: 2017-05-09 | End: 1900-01-01

## 2019-09-23 NOTE — HISTORY OF PRESENT ILLNESS
[FreeTextEntry1] : FU for diabetes, cardiomyopathy, ASCAD, hyperlipidemia, edema\par Eating out most meals  -too much salt\par Trying to watch sugars\par Trying to walk.\par Still dizzy.\par Sugars are in mid 100's  -mostly - occasional low 200's

## 2019-09-23 NOTE — PHYSICAL EXAM
[Normal Sclera/Conjunctiva] : normal sclera/conjunctiva [Normal Oropharynx] : the oropharynx was normal [No JVD] : no jugular venous distention [No Lymphadenopathy] : no lymphadenopathy [Supple] : supple [Normal] : no respiratory distress, lungs were clear to auscultation bilaterally and no accessory muscle use [Normal S1, S2] : normal S1 and S2 [Irregularly Irregular] : irregularly irregular [Soft] : abdomen soft [Non Tender] : non-tender [Non-distended] : non-distended [No HSM] : no HSM [Normal Supraclavicular Nodes] : no supraclavicular lymphadenopathy [Normal Posterior Cervical Nodes] : no posterior cervical lymphadenopathy [Normal Anterior Cervical Nodes] : no anterior cervical lymphadenopathy [Alert and Oriented x3] : oriented to person, place, and time [de-identified] : 2+ edema bilaterally

## 2019-11-15 ENCOUNTER — RX RENEWAL (OUTPATIENT)
Age: 84
End: 2019-11-15

## 2019-11-18 ENCOUNTER — RX RENEWAL (OUTPATIENT)
Age: 84
End: 2019-11-18

## 2019-12-02 ENCOUNTER — RX RENEWAL (OUTPATIENT)
Age: 84
End: 2019-12-02

## 2020-01-01 ENCOUNTER — RX RENEWAL (OUTPATIENT)
Age: 85
End: 2020-01-01

## 2020-01-01 RX ORDER — FUROSEMIDE 40 MG/1
40 TABLET ORAL DAILY
Qty: 90 | Refills: 0 | Status: ACTIVE | COMMUNITY
Start: 2017-08-10 | End: 1900-01-01

## 2020-01-21 RX ORDER — ELECTROLYTES/DEXTROSE
31G X 5 MM SOLUTION, ORAL ORAL
Qty: 200 | Refills: 2 | Status: ACTIVE | COMMUNITY
Start: 2019-08-08 | End: 1900-01-01

## 2020-01-21 RX ORDER — INSULIN GLARGINE 100 [IU]/ML
100 INJECTION, SOLUTION SUBCUTANEOUS DAILY
Qty: 1 | Refills: 3 | Status: ACTIVE | COMMUNITY
Start: 2019-08-08 | End: 1900-01-01

## 2020-03-17 NOTE — H&P ADULT - PROBLEM SELECTOR PROBLEM 5
How Severe Is Your Rash?: mild Is This A New Presentation, Or A Follow-Up?: Rash DM (diabetes mellitus) ZHAO (acute kidney injury)

## 2020-03-31 RX ORDER — OMEPRAZOLE 20 MG/1
20 CAPSULE, DELAYED RELEASE ORAL
Qty: 90 | Refills: 2 | Status: ACTIVE | COMMUNITY
Start: 2019-07-06 | End: 1900-01-01

## 2020-04-02 RX ORDER — BLOOD SUGAR DIAGNOSTIC
STRIP MISCELLANEOUS TWICE DAILY
Qty: 200 | Refills: 2 | Status: ACTIVE | COMMUNITY
Start: 2017-05-09 | End: 1900-01-01

## 2020-04-13 ENCOUNTER — RX RENEWAL (OUTPATIENT)
Age: 85
End: 2020-04-13

## 2020-05-05 ENCOUNTER — RX RENEWAL (OUTPATIENT)
Age: 85
End: 2020-05-05

## 2020-05-09 ENCOUNTER — RX RENEWAL (OUTPATIENT)
Age: 85
End: 2020-05-09

## 2020-05-09 RX ORDER — METOLAZONE 2.5 MG/1
2.5 TABLET ORAL DAILY
Qty: 180 | Refills: 2 | Status: ACTIVE | COMMUNITY
Start: 2017-09-19 | End: 1900-01-01

## 2020-06-26 ENCOUNTER — RX RENEWAL (OUTPATIENT)
Age: 85
End: 2020-06-26

## 2020-07-29 ENCOUNTER — RX RENEWAL (OUTPATIENT)
Age: 85
End: 2020-07-29

## 2021-01-01 ENCOUNTER — RX RENEWAL (OUTPATIENT)
Age: 86
End: 2021-01-01

## 2021-01-01 ENCOUNTER — INPATIENT (INPATIENT)
Facility: HOSPITAL | Age: 86
LOS: 0 days | End: 2021-09-03
Attending: INTERNAL MEDICINE | Admitting: INTERNAL MEDICINE
Payer: MEDICARE

## 2021-01-01 ENCOUNTER — TRANSCRIPTION ENCOUNTER (OUTPATIENT)
Age: 86
End: 2021-01-01

## 2021-01-01 ENCOUNTER — NON-APPOINTMENT (OUTPATIENT)
Age: 86
End: 2021-01-01

## 2021-01-01 ENCOUNTER — INPATIENT (INPATIENT)
Facility: HOSPITAL | Age: 86
LOS: 5 days | Discharge: NURSING, REHAB OR LONG-TERM CARE | DRG: 291 | End: 2021-08-10
Attending: INTERNAL MEDICINE | Admitting: INTERNAL MEDICINE
Payer: MEDICARE

## 2021-01-01 VITALS
SYSTOLIC BLOOD PRESSURE: 136 MMHG | RESPIRATION RATE: 19 BRPM | OXYGEN SATURATION: 92 % | DIASTOLIC BLOOD PRESSURE: 78 MMHG | HEART RATE: 81 BPM | TEMPERATURE: 99 F

## 2021-01-01 VITALS
OXYGEN SATURATION: 96 % | SYSTOLIC BLOOD PRESSURE: 126 MMHG | HEART RATE: 115 BPM | DIASTOLIC BLOOD PRESSURE: 75 MMHG | RESPIRATION RATE: 24 BRPM

## 2021-01-01 VITALS — HEIGHT: 70.5 IN | TEMPERATURE: 96 F

## 2021-01-01 VITALS — OXYGEN SATURATION: 95 % | RESPIRATION RATE: 18 BRPM

## 2021-01-01 DIAGNOSIS — Z98.89 OTHER SPECIFIED POSTPROCEDURAL STATES: Chronic | ICD-10-CM

## 2021-01-01 DIAGNOSIS — K92.2 GASTROINTESTINAL HEMORRHAGE, UNSPECIFIED: ICD-10-CM

## 2021-01-01 DIAGNOSIS — A41.9 SEPSIS, UNSPECIFIED ORGANISM: ICD-10-CM

## 2021-01-01 DIAGNOSIS — N17.9 ACUTE KIDNEY FAILURE, UNSPECIFIED: ICD-10-CM

## 2021-01-01 DIAGNOSIS — R06.00 DYSPNEA, UNSPECIFIED: ICD-10-CM

## 2021-01-01 DIAGNOSIS — I10 ESSENTIAL (PRIMARY) HYPERTENSION: ICD-10-CM

## 2021-01-01 DIAGNOSIS — F41.9 ANXIETY DISORDER, UNSPECIFIED: ICD-10-CM

## 2021-01-01 DIAGNOSIS — Z71.89 OTHER SPECIFIED COUNSELING: ICD-10-CM

## 2021-01-01 DIAGNOSIS — I50.9 HEART FAILURE, UNSPECIFIED: ICD-10-CM

## 2021-01-01 DIAGNOSIS — Z51.5 ENCOUNTER FOR PALLIATIVE CARE: ICD-10-CM

## 2021-01-01 DIAGNOSIS — R53.81 OTHER MALAISE: ICD-10-CM

## 2021-01-01 DIAGNOSIS — G93.41 METABOLIC ENCEPHALOPATHY: ICD-10-CM

## 2021-01-01 LAB
ALBUMIN SERPL ELPH-MCNC: 2.6 G/DL — LOW (ref 3.3–5)
ALBUMIN SERPL ELPH-MCNC: 2.9 G/DL — LOW (ref 3.3–5)
ALBUMIN SERPL ELPH-MCNC: 2.9 G/DL — LOW (ref 3.3–5)
ALBUMIN SERPL ELPH-MCNC: 3 G/DL — LOW (ref 3.3–5)
ALBUMIN SERPL ELPH-MCNC: 3.3 G/DL — SIGNIFICANT CHANGE UP (ref 3.3–5)
ALP SERPL-CCNC: 231 U/L — HIGH (ref 40–120)
ALP SERPL-CCNC: 266 U/L — HIGH (ref 40–120)
ALP SERPL-CCNC: 275 U/L — HIGH (ref 40–120)
ALP SERPL-CCNC: 304 U/L — HIGH (ref 40–120)
ALP SERPL-CCNC: 308 U/L — HIGH (ref 40–120)
ALT FLD-CCNC: 14 U/L — SIGNIFICANT CHANGE UP (ref 10–45)
ALT FLD-CCNC: 15 U/L — SIGNIFICANT CHANGE UP (ref 10–45)
ALT FLD-CCNC: 15 U/L — SIGNIFICANT CHANGE UP (ref 4–41)
ALT FLD-CCNC: 17 U/L — SIGNIFICANT CHANGE UP (ref 10–45)
ALT FLD-CCNC: 17 U/L — SIGNIFICANT CHANGE UP (ref 4–41)
ANION GAP SERPL CALC-SCNC: 10 MMOL/L — SIGNIFICANT CHANGE UP (ref 5–17)
ANION GAP SERPL CALC-SCNC: 10 MMOL/L — SIGNIFICANT CHANGE UP (ref 5–17)
ANION GAP SERPL CALC-SCNC: 11 MMOL/L — SIGNIFICANT CHANGE UP (ref 5–17)
ANION GAP SERPL CALC-SCNC: 12 MMOL/L — SIGNIFICANT CHANGE UP (ref 5–17)
ANION GAP SERPL CALC-SCNC: 13 MMOL/L — SIGNIFICANT CHANGE UP (ref 5–17)
ANION GAP SERPL CALC-SCNC: 13 MMOL/L — SIGNIFICANT CHANGE UP (ref 7–14)
ANION GAP SERPL CALC-SCNC: 15 MMOL/L — HIGH (ref 7–14)
ANION GAP SERPL CALC-SCNC: 15 MMOL/L — SIGNIFICANT CHANGE UP (ref 5–17)
ANION GAP SERPL CALC-SCNC: 9 MMOL/L — SIGNIFICANT CHANGE UP (ref 5–17)
ANISOCYTOSIS BLD QL: SLIGHT — SIGNIFICANT CHANGE UP
APPEARANCE UR: CLEAR — SIGNIFICANT CHANGE UP
APPEARANCE UR: CLEAR — SIGNIFICANT CHANGE UP
APTT BLD: 29.2 SEC — SIGNIFICANT CHANGE UP (ref 27–36.3)
AST SERPL-CCNC: 18 U/L — SIGNIFICANT CHANGE UP (ref 10–40)
AST SERPL-CCNC: 22 U/L — SIGNIFICANT CHANGE UP (ref 10–40)
AST SERPL-CCNC: 25 U/L — SIGNIFICANT CHANGE UP (ref 10–40)
AST SERPL-CCNC: 26 U/L — SIGNIFICANT CHANGE UP (ref 4–40)
AST SERPL-CCNC: 27 U/L — SIGNIFICANT CHANGE UP (ref 4–40)
B PERT DNA SPEC QL NAA+PROBE: SIGNIFICANT CHANGE UP
B PERT+PARAPERT DNA PNL SPEC NAA+PROBE: SIGNIFICANT CHANGE UP
BACTERIA # UR AUTO: NEGATIVE — SIGNIFICANT CHANGE UP
BASE EXCESS BLDV CALC-SCNC: 2.5 MMOL/L — HIGH (ref -2–2)
BASOPHILS # BLD AUTO: 0 K/UL — SIGNIFICANT CHANGE UP (ref 0–0.2)
BASOPHILS # BLD AUTO: 0 K/UL — SIGNIFICANT CHANGE UP (ref 0–0.2)
BASOPHILS NFR BLD AUTO: 0 % — SIGNIFICANT CHANGE UP (ref 0–2)
BASOPHILS NFR BLD AUTO: 0 % — SIGNIFICANT CHANGE UP (ref 0–2)
BILIRUB SERPL-MCNC: 0.3 MG/DL — SIGNIFICANT CHANGE UP (ref 0.2–1.2)
BILIRUB SERPL-MCNC: 0.4 MG/DL — SIGNIFICANT CHANGE UP (ref 0.2–1.2)
BILIRUB SERPL-MCNC: 0.5 MG/DL — SIGNIFICANT CHANGE UP (ref 0.2–1.2)
BILIRUB SERPL-MCNC: 0.6 MG/DL — SIGNIFICANT CHANGE UP (ref 0.2–1.2)
BILIRUB SERPL-MCNC: 0.6 MG/DL — SIGNIFICANT CHANGE UP (ref 0.2–1.2)
BILIRUB UR-MCNC: NEGATIVE — SIGNIFICANT CHANGE UP
BILIRUB UR-MCNC: NEGATIVE — SIGNIFICANT CHANGE UP
BLD GP AB SCN SERPL QL: NEGATIVE — SIGNIFICANT CHANGE UP
BLOOD GAS VENOUS COMPREHENSIVE RESULT: SIGNIFICANT CHANGE UP
BORDETELLA PARAPERTUSSIS (RAPRVP): SIGNIFICANT CHANGE UP
BUN SERPL-MCNC: 25 MG/DL — HIGH (ref 7–23)
BUN SERPL-MCNC: 26 MG/DL — HIGH (ref 7–23)
BUN SERPL-MCNC: 27 MG/DL — HIGH (ref 7–23)
BUN SERPL-MCNC: 28 MG/DL — HIGH (ref 7–23)
BUN SERPL-MCNC: 28 MG/DL — HIGH (ref 7–23)
BUN SERPL-MCNC: 29 MG/DL — HIGH (ref 7–23)
BUN SERPL-MCNC: 32 MG/DL — HIGH (ref 7–23)
BUN SERPL-MCNC: 70 MG/DL — HIGH (ref 7–23)
BUN SERPL-MCNC: 71 MG/DL — HIGH (ref 7–23)
C PNEUM DNA SPEC QL NAA+PROBE: SIGNIFICANT CHANGE UP
CA-I SERPL-SCNC: 1.18 MMOL/L — SIGNIFICANT CHANGE UP (ref 1.12–1.3)
CALCIUM SERPL-MCNC: 7.3 MG/DL — LOW (ref 8.4–10.5)
CALCIUM SERPL-MCNC: 7.4 MG/DL — LOW (ref 8.4–10.5)
CALCIUM SERPL-MCNC: 7.8 MG/DL — LOW (ref 8.4–10.5)
CALCIUM SERPL-MCNC: 8.1 MG/DL — LOW (ref 8.4–10.5)
CALCIUM SERPL-MCNC: 8.3 MG/DL — LOW (ref 8.4–10.5)
CALCIUM SERPL-MCNC: 8.4 MG/DL — SIGNIFICANT CHANGE UP (ref 8.4–10.5)
CALCIUM SERPL-MCNC: 8.5 MG/DL — SIGNIFICANT CHANGE UP (ref 8.4–10.5)
CALCIUM SERPL-MCNC: 8.6 MG/DL — SIGNIFICANT CHANGE UP (ref 8.4–10.5)
CALCIUM SERPL-MCNC: 8.7 MG/DL — SIGNIFICANT CHANGE UP (ref 8.4–10.5)
CHLORIDE BLDV-SCNC: 109 MMOL/L — HIGH (ref 96–108)
CHLORIDE SERPL-SCNC: 101 MMOL/L — SIGNIFICANT CHANGE UP (ref 96–108)
CHLORIDE SERPL-SCNC: 101 MMOL/L — SIGNIFICANT CHANGE UP (ref 96–108)
CHLORIDE SERPL-SCNC: 103 MMOL/L — SIGNIFICANT CHANGE UP (ref 96–108)
CHLORIDE SERPL-SCNC: 104 MMOL/L — SIGNIFICANT CHANGE UP (ref 96–108)
CHLORIDE SERPL-SCNC: 105 MMOL/L — SIGNIFICANT CHANGE UP (ref 96–108)
CHLORIDE SERPL-SCNC: 106 MMOL/L — SIGNIFICANT CHANGE UP (ref 96–108)
CHLORIDE SERPL-SCNC: 107 MMOL/L — SIGNIFICANT CHANGE UP (ref 96–108)
CHLORIDE SERPL-SCNC: 109 MMOL/L — HIGH (ref 98–107)
CHLORIDE SERPL-SCNC: 109 MMOL/L — HIGH (ref 98–107)
CK SERPL-CCNC: 28 U/L — LOW (ref 30–200)
CO2 BLDV-SCNC: 30 MMOL/L — SIGNIFICANT CHANGE UP (ref 22–30)
CO2 SERPL-SCNC: 16 MMOL/L — LOW (ref 22–31)
CO2 SERPL-SCNC: 17 MMOL/L — LOW (ref 22–31)
CO2 SERPL-SCNC: 22 MMOL/L — SIGNIFICANT CHANGE UP (ref 22–31)
CO2 SERPL-SCNC: 23 MMOL/L — SIGNIFICANT CHANGE UP (ref 22–31)
CO2 SERPL-SCNC: 23 MMOL/L — SIGNIFICANT CHANGE UP (ref 22–31)
CO2 SERPL-SCNC: 24 MMOL/L — SIGNIFICANT CHANGE UP (ref 22–31)
CO2 SERPL-SCNC: 25 MMOL/L — SIGNIFICANT CHANGE UP (ref 22–31)
CO2 SERPL-SCNC: 26 MMOL/L — SIGNIFICANT CHANGE UP (ref 22–31)
CO2 SERPL-SCNC: 26 MMOL/L — SIGNIFICANT CHANGE UP (ref 22–31)
COLOR SPEC: COLORLESS — SIGNIFICANT CHANGE UP
COLOR SPEC: SIGNIFICANT CHANGE UP
COVID-19 SPIKE DOMAIN AB INTERP: POSITIVE
COVID-19 SPIKE DOMAIN ANTIBODY RESULT: 24.5 U/ML — HIGH
CREAT SERPL-MCNC: 1.47 MG/DL — HIGH (ref 0.5–1.3)
CREAT SERPL-MCNC: 1.58 MG/DL — HIGH (ref 0.5–1.3)
CREAT SERPL-MCNC: 1.69 MG/DL — HIGH (ref 0.5–1.3)
CREAT SERPL-MCNC: 1.75 MG/DL — HIGH (ref 0.5–1.3)
CREAT SERPL-MCNC: 1.76 MG/DL — HIGH (ref 0.5–1.3)
CREAT SERPL-MCNC: 1.91 MG/DL — HIGH (ref 0.5–1.3)
CREAT SERPL-MCNC: 1.93 MG/DL — HIGH (ref 0.5–1.3)
CREAT SERPL-MCNC: 3.4 MG/DL — HIGH (ref 0.5–1.3)
CREAT SERPL-MCNC: 3.45 MG/DL — HIGH (ref 0.5–1.3)
CULTURE RESULTS: NO GROWTH — SIGNIFICANT CHANGE UP
CULTURE RESULTS: SIGNIFICANT CHANGE UP
D DIMER BLD IA.RAPID-MCNC: 770 NG/ML DDU — HIGH
DACRYOCYTES BLD QL SMEAR: SLIGHT — SIGNIFICANT CHANGE UP
DIFF PNL FLD: ABNORMAL
DIFF PNL FLD: ABNORMAL
EOSINOPHIL # BLD AUTO: 0 K/UL — SIGNIFICANT CHANGE UP (ref 0–0.5)
EOSINOPHIL # BLD AUTO: 0.25 K/UL — SIGNIFICANT CHANGE UP (ref 0–0.5)
EOSINOPHIL NFR BLD AUTO: 0 % — SIGNIFICANT CHANGE UP (ref 0–6)
EOSINOPHIL NFR BLD AUTO: 1.7 % — SIGNIFICANT CHANGE UP (ref 0–6)
EPI CELLS # UR: 0 /HPF — SIGNIFICANT CHANGE UP
FLUAV SUBTYP SPEC NAA+PROBE: SIGNIFICANT CHANGE UP
FLUBV RNA SPEC QL NAA+PROBE: SIGNIFICANT CHANGE UP
GAS PNL BLDV: 142 MMOL/L — SIGNIFICANT CHANGE UP (ref 135–145)
GAS PNL BLDV: SIGNIFICANT CHANGE UP
GAS PNL BLDV: SIGNIFICANT CHANGE UP
GLUCOSE BLDV-MCNC: 149 MG/DL — HIGH (ref 70–99)
GLUCOSE SERPL-MCNC: 123 MG/DL — HIGH (ref 70–99)
GLUCOSE SERPL-MCNC: 134 MG/DL — HIGH (ref 70–99)
GLUCOSE SERPL-MCNC: 151 MG/DL — HIGH (ref 70–99)
GLUCOSE SERPL-MCNC: 152 MG/DL — HIGH (ref 70–99)
GLUCOSE SERPL-MCNC: 159 MG/DL — HIGH (ref 70–99)
GLUCOSE SERPL-MCNC: 179 MG/DL — HIGH (ref 70–99)
GLUCOSE SERPL-MCNC: 180 MG/DL — HIGH (ref 70–99)
GLUCOSE SERPL-MCNC: 185 MG/DL — HIGH (ref 70–99)
GLUCOSE SERPL-MCNC: 36 MG/DL — CRITICAL LOW (ref 70–99)
GLUCOSE UR QL: NEGATIVE — SIGNIFICANT CHANGE UP
GLUCOSE UR QL: NEGATIVE — SIGNIFICANT CHANGE UP
HADV DNA SPEC QL NAA+PROBE: SIGNIFICANT CHANGE UP
HCO3 BLDV-SCNC: 28 MMOL/L — SIGNIFICANT CHANGE UP (ref 21–29)
HCOV 229E RNA SPEC QL NAA+PROBE: SIGNIFICANT CHANGE UP
HCOV HKU1 RNA SPEC QL NAA+PROBE: SIGNIFICANT CHANGE UP
HCOV NL63 RNA SPEC QL NAA+PROBE: SIGNIFICANT CHANGE UP
HCOV OC43 RNA SPEC QL NAA+PROBE: SIGNIFICANT CHANGE UP
HCT VFR BLD CALC: 26.7 % — LOW (ref 39–50)
HCT VFR BLD CALC: 28.3 % — LOW (ref 39–50)
HCT VFR BLD CALC: 29.3 % — LOW (ref 39–50)
HCT VFR BLD CALC: 29.5 % — LOW (ref 39–50)
HCT VFR BLD CALC: 30.5 % — LOW (ref 39–50)
HCT VFR BLD CALC: 30.6 % — LOW (ref 39–50)
HCT VFR BLDA CALC: 29 % — LOW (ref 39–50)
HGB BLD CALC-MCNC: 9.5 G/DL — LOW (ref 13–17)
HGB BLD-MCNC: 8.2 G/DL — LOW (ref 13–17)
HGB BLD-MCNC: 8.5 G/DL — LOW (ref 13–17)
HGB BLD-MCNC: 8.6 G/DL — LOW (ref 13–17)
HGB BLD-MCNC: 8.8 G/DL — LOW (ref 13–17)
HGB BLD-MCNC: 9 G/DL — LOW (ref 13–17)
HGB BLD-MCNC: 9.2 G/DL — LOW (ref 13–17)
HMPV RNA SPEC QL NAA+PROBE: SIGNIFICANT CHANGE UP
HPIV1 RNA SPEC QL NAA+PROBE: SIGNIFICANT CHANGE UP
HPIV2 RNA SPEC QL NAA+PROBE: SIGNIFICANT CHANGE UP
HPIV3 RNA SPEC QL NAA+PROBE: SIGNIFICANT CHANGE UP
HPIV4 RNA SPEC QL NAA+PROBE: SIGNIFICANT CHANGE UP
HYALINE CASTS # UR AUTO: 1 /LPF — SIGNIFICANT CHANGE UP (ref 0–2)
IANC: 12.41 K/UL — HIGH (ref 1.5–8.5)
INR BLD: 1.17 RATIO — HIGH (ref 0.88–1.16)
KETONES UR-MCNC: NEGATIVE — SIGNIFICANT CHANGE UP
KETONES UR-MCNC: NEGATIVE — SIGNIFICANT CHANGE UP
LACTATE BLDV-MCNC: 1.2 MMOL/L — SIGNIFICANT CHANGE UP (ref 0.7–2)
LEUKOCYTE ESTERASE UR-ACNC: ABNORMAL
LEUKOCYTE ESTERASE UR-ACNC: NEGATIVE — SIGNIFICANT CHANGE UP
LYMPHOCYTES # BLD AUTO: 0 % — LOW (ref 13–44)
LYMPHOCYTES # BLD AUTO: 0 K/UL — LOW (ref 1–3.3)
LYMPHOCYTES # BLD AUTO: 0.13 K/UL — LOW (ref 1–3.3)
LYMPHOCYTES # BLD AUTO: 0.9 % — LOW (ref 13–44)
M PNEUMO DNA SPEC QL NAA+PROBE: SIGNIFICANT CHANGE UP
MACROCYTES BLD QL: SLIGHT — SIGNIFICANT CHANGE UP
MAGNESIUM SERPL-MCNC: 1.5 MG/DL — LOW (ref 1.6–2.6)
MAGNESIUM SERPL-MCNC: 1.5 MG/DL — LOW (ref 1.6–2.6)
MAGNESIUM SERPL-MCNC: 1.6 MG/DL — SIGNIFICANT CHANGE UP (ref 1.6–2.6)
MAGNESIUM SERPL-MCNC: 1.8 MG/DL — SIGNIFICANT CHANGE UP (ref 1.6–2.6)
MAGNESIUM SERPL-MCNC: 1.8 MG/DL — SIGNIFICANT CHANGE UP (ref 1.6–2.6)
MANUAL SMEAR VERIFICATION: SIGNIFICANT CHANGE UP
MCHC RBC-ENTMCNC: 25.5 PG — LOW (ref 27–34)
MCHC RBC-ENTMCNC: 25.5 PG — LOW (ref 27–34)
MCHC RBC-ENTMCNC: 25.9 PG — LOW (ref 27–34)
MCHC RBC-ENTMCNC: 25.9 PG — LOW (ref 27–34)
MCHC RBC-ENTMCNC: 26 PG — LOW (ref 27–34)
MCHC RBC-ENTMCNC: 27.3 PG — SIGNIFICANT CHANGE UP (ref 27–34)
MCHC RBC-ENTMCNC: 29.2 GM/DL — LOW (ref 32–36)
MCHC RBC-ENTMCNC: 29.4 GM/DL — LOW (ref 32–36)
MCHC RBC-ENTMCNC: 30 GM/DL — LOW (ref 32–36)
MCHC RBC-ENTMCNC: 30 GM/DL — LOW (ref 32–36)
MCHC RBC-ENTMCNC: 30.2 GM/DL — LOW (ref 32–36)
MCHC RBC-ENTMCNC: 30.7 GM/DL — LOW (ref 32–36)
MCV RBC AUTO: 86.2 FL — SIGNIFICANT CHANGE UP (ref 80–100)
MCV RBC AUTO: 86.2 FL — SIGNIFICANT CHANGE UP (ref 80–100)
MCV RBC AUTO: 86.3 FL — SIGNIFICANT CHANGE UP (ref 80–100)
MCV RBC AUTO: 86.7 FL — SIGNIFICANT CHANGE UP (ref 80–100)
MCV RBC AUTO: 87.5 FL — SIGNIFICANT CHANGE UP (ref 80–100)
MCV RBC AUTO: 89 FL — SIGNIFICANT CHANGE UP (ref 80–100)
MONOCYTES # BLD AUTO: 0.39 K/UL — SIGNIFICANT CHANGE UP (ref 0–0.9)
MONOCYTES # BLD AUTO: 0.46 K/UL — SIGNIFICANT CHANGE UP (ref 0–0.9)
MONOCYTES NFR BLD AUTO: 2.6 % — SIGNIFICANT CHANGE UP (ref 2–14)
MONOCYTES NFR BLD AUTO: 3.5 % — SIGNIFICANT CHANGE UP (ref 2–14)
NEUTROPHILS # BLD AUTO: 12.82 K/UL — HIGH (ref 1.8–7.4)
NEUTROPHILS # BLD AUTO: 13.91 K/UL — HIGH (ref 1.8–7.4)
NEUTROPHILS NFR BLD AUTO: 92.2 % — HIGH (ref 43–77)
NEUTROPHILS NFR BLD AUTO: 96.5 % — HIGH (ref 43–77)
NEUTS BAND # BLD: 1.7 % — SIGNIFICANT CHANGE UP (ref 0–8)
NITRITE UR-MCNC: NEGATIVE — SIGNIFICANT CHANGE UP
NITRITE UR-MCNC: NEGATIVE — SIGNIFICANT CHANGE UP
NRBC # BLD: 0 /100 WBCS — SIGNIFICANT CHANGE UP (ref 0–0)
NT-PROBNP SERPL-SCNC: HIGH PG/ML (ref 0–300)
OVALOCYTES BLD QL SMEAR: SLIGHT — SIGNIFICANT CHANGE UP
PCO2 BLDV: 51 MMHG — HIGH (ref 35–50)
PH BLDV: 7.36 — SIGNIFICANT CHANGE UP (ref 7.35–7.45)
PH UR: 6 — SIGNIFICANT CHANGE UP (ref 5–8)
PH UR: 6 — SIGNIFICANT CHANGE UP (ref 5–8)
PHOSPHATE SERPL-MCNC: 1.9 MG/DL — LOW (ref 2.5–4.5)
PHOSPHATE SERPL-MCNC: 2.2 MG/DL — LOW (ref 2.5–4.5)
PHOSPHATE SERPL-MCNC: 3.2 MG/DL — SIGNIFICANT CHANGE UP (ref 2.5–4.5)
PLAT MORPH BLD: NORMAL — SIGNIFICANT CHANGE UP
PLATELET # BLD AUTO: 101 K/UL — LOW (ref 150–400)
PLATELET # BLD AUTO: 105 K/UL — LOW (ref 150–400)
PLATELET # BLD AUTO: 136 K/UL — LOW (ref 150–400)
PLATELET # BLD AUTO: 93 K/UL — LOW (ref 150–400)
PLATELET # BLD AUTO: 95 K/UL — LOW (ref 150–400)
PLATELET # BLD AUTO: 95 K/UL — LOW (ref 150–400)
PO2 BLDV: 23 MMHG — LOW (ref 25–45)
POIKILOCYTOSIS BLD QL AUTO: SLIGHT — SIGNIFICANT CHANGE UP
POTASSIUM BLDV-SCNC: 3.7 MMOL/L — SIGNIFICANT CHANGE UP (ref 3.5–5.3)
POTASSIUM SERPL-MCNC: 3.5 MMOL/L — SIGNIFICANT CHANGE UP (ref 3.5–5.3)
POTASSIUM SERPL-MCNC: 3.8 MMOL/L — SIGNIFICANT CHANGE UP (ref 3.5–5.3)
POTASSIUM SERPL-MCNC: 3.9 MMOL/L — SIGNIFICANT CHANGE UP (ref 3.5–5.3)
POTASSIUM SERPL-MCNC: 4.1 MMOL/L — SIGNIFICANT CHANGE UP (ref 3.5–5.3)
POTASSIUM SERPL-MCNC: 4.4 MMOL/L — SIGNIFICANT CHANGE UP (ref 3.5–5.3)
POTASSIUM SERPL-MCNC: 4.5 MMOL/L — SIGNIFICANT CHANGE UP (ref 3.5–5.3)
POTASSIUM SERPL-MCNC: 4.7 MMOL/L — SIGNIFICANT CHANGE UP (ref 3.5–5.3)
POTASSIUM SERPL-MCNC: 5.7 MMOL/L — HIGH (ref 3.5–5.3)
POTASSIUM SERPL-MCNC: 5.7 MMOL/L — HIGH (ref 3.5–5.3)
POTASSIUM SERPL-SCNC: 3.5 MMOL/L — SIGNIFICANT CHANGE UP (ref 3.5–5.3)
POTASSIUM SERPL-SCNC: 3.8 MMOL/L — SIGNIFICANT CHANGE UP (ref 3.5–5.3)
POTASSIUM SERPL-SCNC: 3.9 MMOL/L — SIGNIFICANT CHANGE UP (ref 3.5–5.3)
POTASSIUM SERPL-SCNC: 4.1 MMOL/L — SIGNIFICANT CHANGE UP (ref 3.5–5.3)
POTASSIUM SERPL-SCNC: 4.4 MMOL/L — SIGNIFICANT CHANGE UP (ref 3.5–5.3)
POTASSIUM SERPL-SCNC: 4.5 MMOL/L — SIGNIFICANT CHANGE UP (ref 3.5–5.3)
POTASSIUM SERPL-SCNC: 4.7 MMOL/L — SIGNIFICANT CHANGE UP (ref 3.5–5.3)
POTASSIUM SERPL-SCNC: 5.7 MMOL/L — HIGH (ref 3.5–5.3)
POTASSIUM SERPL-SCNC: 5.7 MMOL/L — HIGH (ref 3.5–5.3)
PROT SERPL-MCNC: 5.4 G/DL — LOW (ref 6–8.3)
PROT SERPL-MCNC: 5.8 G/DL — LOW (ref 6–8.3)
PROT SERPL-MCNC: 6 G/DL — SIGNIFICANT CHANGE UP (ref 6–8.3)
PROT SERPL-MCNC: 6.2 G/DL — SIGNIFICANT CHANGE UP (ref 6–8.3)
PROT SERPL-MCNC: 6.4 G/DL — SIGNIFICANT CHANGE UP (ref 6–8.3)
PROT UR-MCNC: ABNORMAL
PROT UR-MCNC: NEGATIVE — SIGNIFICANT CHANGE UP
PROTHROM AB SERPL-ACNC: 13.4 SEC — SIGNIFICANT CHANGE UP (ref 10.6–13.6)
RAPID RVP RESULT: SIGNIFICANT CHANGE UP
RAPID RVP RESULT: SIGNIFICANT CHANGE UP
RBC # BLD: 3 M/UL — LOW (ref 4.2–5.8)
RBC # BLD: 3.28 M/UL — LOW (ref 4.2–5.8)
RBC # BLD: 3.37 M/UL — LOW (ref 4.2–5.8)
RBC # BLD: 3.4 M/UL — LOW (ref 4.2–5.8)
RBC # BLD: 3.53 M/UL — LOW (ref 4.2–5.8)
RBC # BLD: 3.54 M/UL — LOW (ref 4.2–5.8)
RBC # FLD: 19.2 % — HIGH (ref 10.3–14.5)
RBC # FLD: 20.3 % — HIGH (ref 10.3–14.5)
RBC # FLD: 20.4 % — HIGH (ref 10.3–14.5)
RBC # FLD: 20.5 % — HIGH (ref 10.3–14.5)
RBC # FLD: 20.5 % — HIGH (ref 10.3–14.5)
RBC # FLD: 20.6 % — HIGH (ref 10.3–14.5)
RBC BLD AUTO: ABNORMAL
RBC CASTS # UR COMP ASSIST: 3 /HPF — SIGNIFICANT CHANGE UP (ref 0–4)
RH IG SCN BLD-IMP: POSITIVE — SIGNIFICANT CHANGE UP
RH IG SCN BLD-IMP: POSITIVE — SIGNIFICANT CHANGE UP
RSV RNA SPEC QL NAA+PROBE: SIGNIFICANT CHANGE UP
RV+EV RNA SPEC QL NAA+PROBE: SIGNIFICANT CHANGE UP
SAO2 % BLDV: 33 % — LOW (ref 67–88)
SARS-COV-2 IGG+IGM SERPL QL IA: 24.5 U/ML — HIGH
SARS-COV-2 IGG+IGM SERPL QL IA: POSITIVE
SARS-COV-2 RNA SPEC QL NAA+PROBE: SIGNIFICANT CHANGE UP
SCHISTOCYTES BLD QL AUTO: SLIGHT — SIGNIFICANT CHANGE UP
SODIUM SERPL-SCNC: 136 MMOL/L — SIGNIFICANT CHANGE UP (ref 135–145)
SODIUM SERPL-SCNC: 137 MMOL/L — SIGNIFICANT CHANGE UP (ref 135–145)
SODIUM SERPL-SCNC: 137 MMOL/L — SIGNIFICANT CHANGE UP (ref 135–145)
SODIUM SERPL-SCNC: 139 MMOL/L — SIGNIFICANT CHANGE UP (ref 135–145)
SODIUM SERPL-SCNC: 140 MMOL/L — SIGNIFICANT CHANGE UP (ref 135–145)
SODIUM SERPL-SCNC: 142 MMOL/L — SIGNIFICANT CHANGE UP (ref 135–145)
SODIUM SERPL-SCNC: 144 MMOL/L — SIGNIFICANT CHANGE UP (ref 135–145)
SP GR SPEC: 1.01 — LOW (ref 1.01–1.02)
SP GR SPEC: 1.01 — SIGNIFICANT CHANGE UP (ref 1–1.05)
SPECIMEN SOURCE: SIGNIFICANT CHANGE UP
SPECIMEN SOURCE: SIGNIFICANT CHANGE UP
TARGETS BLD QL SMEAR: SLIGHT — SIGNIFICANT CHANGE UP
TROPONIN T, HIGH SENSITIVITY RESULT: 112 NG/L — CRITICAL HIGH
TROPONIN T, HIGH SENSITIVITY RESULT: 138 NG/L — HIGH (ref 0–51)
TROPONIN T, HIGH SENSITIVITY RESULT: 154 NG/L — HIGH (ref 0–51)
UROBILINOGEN FLD QL: NEGATIVE — SIGNIFICANT CHANGE UP
UROBILINOGEN FLD QL: SIGNIFICANT CHANGE UP
VARIANT LYMPHS # BLD: 0.9 % — SIGNIFICANT CHANGE UP (ref 0–6)
WBC # BLD: 13.28 K/UL — HIGH (ref 3.8–10.5)
WBC # BLD: 14.81 K/UL — HIGH (ref 3.8–10.5)
WBC # BLD: 6.49 K/UL — SIGNIFICANT CHANGE UP (ref 3.8–10.5)
WBC # BLD: 7.09 K/UL — SIGNIFICANT CHANGE UP (ref 3.8–10.5)
WBC # BLD: 7.97 K/UL — SIGNIFICANT CHANGE UP (ref 3.8–10.5)
WBC # BLD: 8.35 K/UL — SIGNIFICANT CHANGE UP (ref 3.8–10.5)
WBC # FLD AUTO: 13.28 K/UL — HIGH (ref 3.8–10.5)
WBC # FLD AUTO: 14.81 K/UL — HIGH (ref 3.8–10.5)
WBC # FLD AUTO: 6.49 K/UL — SIGNIFICANT CHANGE UP (ref 3.8–10.5)
WBC # FLD AUTO: 7.09 K/UL — SIGNIFICANT CHANGE UP (ref 3.8–10.5)
WBC # FLD AUTO: 7.97 K/UL — SIGNIFICANT CHANGE UP (ref 3.8–10.5)
WBC # FLD AUTO: 8.35 K/UL — SIGNIFICANT CHANGE UP (ref 3.8–10.5)
WBC UR QL: 0 /HPF — SIGNIFICANT CHANGE UP (ref 0–5)

## 2021-01-01 PROCEDURE — 84484 ASSAY OF TROPONIN QUANT: CPT

## 2021-01-01 PROCEDURE — 76770 US EXAM ABDO BACK WALL COMP: CPT | Mod: 26

## 2021-01-01 PROCEDURE — 71045 X-RAY EXAM CHEST 1 VIEW: CPT | Mod: 26

## 2021-01-01 PROCEDURE — 93306 TTE W/DOPPLER COMPLETE: CPT | Mod: 26

## 2021-01-01 PROCEDURE — 80048 BASIC METABOLIC PNL TOTAL CA: CPT

## 2021-01-01 PROCEDURE — 87507 IADNA-DNA/RNA PROBE TQ 12-25: CPT

## 2021-01-01 PROCEDURE — 82550 ASSAY OF CK (CPK): CPT

## 2021-01-01 PROCEDURE — U0003: CPT

## 2021-01-01 PROCEDURE — 99285 EMERGENCY DEPT VISIT HI MDM: CPT | Mod: 25

## 2021-01-01 PROCEDURE — 97162 PT EVAL MOD COMPLEX 30 MIN: CPT

## 2021-01-01 PROCEDURE — 84100 ASSAY OF PHOSPHORUS: CPT

## 2021-01-01 PROCEDURE — 80053 COMPREHEN METABOLIC PANEL: CPT

## 2021-01-01 PROCEDURE — 82803 BLOOD GASES ANY COMBINATION: CPT

## 2021-01-01 PROCEDURE — 76770 US EXAM ABDO BACK WALL COMP: CPT

## 2021-01-01 PROCEDURE — 99232 SBSQ HOSP IP/OBS MODERATE 35: CPT

## 2021-01-01 PROCEDURE — 87086 URINE CULTURE/COLONY COUNT: CPT

## 2021-01-01 PROCEDURE — 99285 EMERGENCY DEPT VISIT HI MDM: CPT

## 2021-01-01 PROCEDURE — 99223 1ST HOSP IP/OBS HIGH 75: CPT

## 2021-01-01 PROCEDURE — 82330 ASSAY OF CALCIUM: CPT

## 2021-01-01 PROCEDURE — 70450 CT HEAD/BRAIN W/O DYE: CPT | Mod: 26,MA

## 2021-01-01 PROCEDURE — 83735 ASSAY OF MAGNESIUM: CPT

## 2021-01-01 PROCEDURE — 86769 SARS-COV-2 COVID-19 ANTIBODY: CPT

## 2021-01-01 PROCEDURE — 84132 ASSAY OF SERUM POTASSIUM: CPT

## 2021-01-01 PROCEDURE — 85018 HEMOGLOBIN: CPT

## 2021-01-01 PROCEDURE — 83605 ASSAY OF LACTIC ACID: CPT

## 2021-01-01 PROCEDURE — 93306 TTE W/DOPPLER COMPLETE: CPT

## 2021-01-01 PROCEDURE — 71045 X-RAY EXAM CHEST 1 VIEW: CPT

## 2021-01-01 PROCEDURE — 93010 ELECTROCARDIOGRAM REPORT: CPT

## 2021-01-01 PROCEDURE — 82435 ASSAY OF BLOOD CHLORIDE: CPT

## 2021-01-01 PROCEDURE — 96374 THER/PROPH/DIAG INJ IV PUSH: CPT

## 2021-01-01 PROCEDURE — 0225U NFCT DS DNA&RNA 21 SARSCOV2: CPT

## 2021-01-01 PROCEDURE — 70450 CT HEAD/BRAIN W/O DYE: CPT | Mod: MA

## 2021-01-01 PROCEDURE — 96375 TX/PRO/DX INJ NEW DRUG ADDON: CPT

## 2021-01-01 PROCEDURE — 93005 ELECTROCARDIOGRAM TRACING: CPT

## 2021-01-01 PROCEDURE — U0005: CPT

## 2021-01-01 PROCEDURE — 81001 URINALYSIS AUTO W/SCOPE: CPT

## 2021-01-01 PROCEDURE — 85027 COMPLETE CBC AUTOMATED: CPT

## 2021-01-01 PROCEDURE — 85379 FIBRIN DEGRADATION QUANT: CPT

## 2021-01-01 PROCEDURE — 84295 ASSAY OF SERUM SODIUM: CPT

## 2021-01-01 PROCEDURE — 85014 HEMATOCRIT: CPT

## 2021-01-01 PROCEDURE — 85025 COMPLETE CBC W/AUTO DIFF WBC: CPT

## 2021-01-01 PROCEDURE — 83880 ASSAY OF NATRIURETIC PEPTIDE: CPT

## 2021-01-01 PROCEDURE — 99291 CRITICAL CARE FIRST HOUR: CPT | Mod: GC

## 2021-01-01 PROCEDURE — 82947 ASSAY GLUCOSE BLOOD QUANT: CPT

## 2021-01-01 RX ORDER — ASPIRIN/CALCIUM CARB/MAGNESIUM 324 MG
1 TABLET ORAL
Qty: 0 | Refills: 0 | DISCHARGE
Start: 2021-01-01

## 2021-01-01 RX ORDER — CLOPIDOGREL BISULFATE 75 MG/1
1 TABLET, FILM COATED ORAL
Qty: 0 | Refills: 0 | DISCHARGE

## 2021-01-01 RX ORDER — POTASSIUM CHLORIDE 20 MEQ
20 PACKET (EA) ORAL
Refills: 0 | Status: DISCONTINUED | OUTPATIENT
Start: 2021-01-01 | End: 2021-01-01

## 2021-01-01 RX ORDER — MAGNESIUM SULFATE 500 MG/ML
4 VIAL (ML) INJECTION ONCE
Refills: 0 | Status: COMPLETED | OUTPATIENT
Start: 2021-01-01 | End: 2021-01-01

## 2021-01-01 RX ORDER — SITAGLIPTIN 50 MG/1
1 TABLET, FILM COATED ORAL
Qty: 0 | Refills: 0 | DISCHARGE

## 2021-01-01 RX ORDER — FUROSEMIDE 40 MG
40 TABLET ORAL DAILY
Refills: 0 | Status: DISCONTINUED | OUTPATIENT
Start: 2021-01-01 | End: 2021-01-01

## 2021-01-01 RX ORDER — HYDROMORPHONE HYDROCHLORIDE 2 MG/ML
0.5 INJECTION INTRAMUSCULAR; INTRAVENOUS; SUBCUTANEOUS EVERY 4 HOURS
Refills: 0 | Status: DISCONTINUED | OUTPATIENT
Start: 2021-01-01 | End: 2021-01-01

## 2021-01-01 RX ORDER — POTASSIUM CHLORIDE 20 MEQ
40 PACKET (EA) ORAL ONCE
Refills: 0 | Status: COMPLETED | OUTPATIENT
Start: 2021-01-01 | End: 2021-01-01

## 2021-01-01 RX ORDER — ASPIRIN/CALCIUM CARB/MAGNESIUM 324 MG
81 TABLET ORAL DAILY
Refills: 0 | Status: DISCONTINUED | OUTPATIENT
Start: 2021-01-01 | End: 2021-01-01

## 2021-01-01 RX ORDER — METOPROLOL TARTRATE 50 MG
25 TABLET ORAL
Refills: 0 | Status: DISCONTINUED | OUTPATIENT
Start: 2021-01-01 | End: 2021-01-01

## 2021-01-01 RX ORDER — TAMSULOSIN HYDROCHLORIDE 0.4 MG/1
1 CAPSULE ORAL
Qty: 0 | Refills: 0 | DISCHARGE

## 2021-01-01 RX ORDER — FUROSEMIDE 40 MG
80 TABLET ORAL
Refills: 0 | Status: DISCONTINUED | OUTPATIENT
Start: 2021-01-01 | End: 2021-01-01

## 2021-01-01 RX ORDER — FUROSEMIDE 40 MG
1 TABLET ORAL
Qty: 0 | Refills: 0 | DISCHARGE
Start: 2021-01-01

## 2021-01-01 RX ORDER — MAGNESIUM SULFATE 500 MG/ML
1 VIAL (ML) INJECTION ONCE
Refills: 0 | Status: COMPLETED | OUTPATIENT
Start: 2021-01-01 | End: 2021-01-01

## 2021-01-01 RX ORDER — SODIUM CHLORIDE 9 MG/ML
1000 INJECTION INTRAMUSCULAR; INTRAVENOUS; SUBCUTANEOUS
Refills: 0 | Status: DISCONTINUED | OUTPATIENT
Start: 2021-01-01 | End: 2021-01-01

## 2021-01-01 RX ORDER — POTASSIUM PHOSPHATE, MONOBASIC POTASSIUM PHOSPHATE, DIBASIC 236; 224 MG/ML; MG/ML
15 INJECTION, SOLUTION INTRAVENOUS ONCE
Refills: 0 | Status: COMPLETED | OUTPATIENT
Start: 2021-01-01 | End: 2021-01-01

## 2021-01-01 RX ORDER — POTASSIUM CHLORIDE 20 MEQ
40 PACKET (EA) ORAL
Refills: 0 | Status: DISCONTINUED | OUTPATIENT
Start: 2021-01-01 | End: 2021-01-01

## 2021-01-01 RX ORDER — POTASSIUM CHLORIDE 20 MEQ
1 PACKET (EA) ORAL
Qty: 0 | Refills: 0 | DISCHARGE
Start: 2021-01-01

## 2021-01-01 RX ORDER — DEXTROSE 50 % IN WATER 50 %
25 SYRINGE (ML) INTRAVENOUS ONCE
Refills: 0 | Status: COMPLETED | OUTPATIENT
Start: 2021-01-01 | End: 2021-01-01

## 2021-01-01 RX ORDER — FUROSEMIDE 40 MG
40 TABLET ORAL ONCE
Refills: 0 | Status: COMPLETED | OUTPATIENT
Start: 2021-01-01 | End: 2021-01-01

## 2021-01-01 RX ORDER — POTASSIUM CHLORIDE 20 MEQ
2 PACKET (EA) ORAL
Qty: 0 | Refills: 0 | DISCHARGE

## 2021-01-01 RX ORDER — MORPHINE SULFATE 50 MG/1
2 CAPSULE, EXTENDED RELEASE ORAL EVERY 6 HOURS
Refills: 0 | Status: DISCONTINUED | OUTPATIENT
Start: 2021-01-01 | End: 2021-01-01

## 2021-01-01 RX ORDER — SITAGLIPTIN 50 MG/1
50 TABLET, FILM COATED ORAL
Qty: 90 | Refills: 0 | Status: ACTIVE | COMMUNITY
Start: 2019-01-04 | End: 1900-01-01

## 2021-01-01 RX ORDER — PANTOPRAZOLE SODIUM 20 MG/1
40 TABLET, DELAYED RELEASE ORAL EVERY 12 HOURS
Refills: 0 | Status: DISCONTINUED | OUTPATIENT
Start: 2021-01-01 | End: 2021-01-01

## 2021-01-01 RX ORDER — ASPIRIN/CALCIUM CARB/MAGNESIUM 324 MG
1 TABLET ORAL
Qty: 0 | Refills: 0 | DISCHARGE

## 2021-01-01 RX ORDER — FINASTERIDE 5 MG/1
1 TABLET, FILM COATED ORAL
Qty: 0 | Refills: 0 | DISCHARGE

## 2021-01-01 RX ORDER — MEROPENEM 1 G/30ML
500 INJECTION INTRAVENOUS EVERY 12 HOURS
Refills: 0 | Status: DISCONTINUED | OUTPATIENT
Start: 2021-01-01 | End: 2021-01-01

## 2021-01-01 RX ORDER — NYSTATIN CREAM 100000 [USP'U]/G
1 CREAM TOPICAL
Refills: 0 | Status: DISCONTINUED | OUTPATIENT
Start: 2021-01-01 | End: 2021-01-01

## 2021-01-01 RX ORDER — ALPRAZOLAM 0.25 MG
1 TABLET ORAL
Qty: 0 | Refills: 0 | DISCHARGE

## 2021-01-01 RX ORDER — MAGNESIUM SULFATE 500 MG/ML
2 VIAL (ML) INJECTION ONCE
Refills: 0 | Status: COMPLETED | OUTPATIENT
Start: 2021-01-01 | End: 2021-01-01

## 2021-01-01 RX ORDER — NYSTATIN CREAM 100000 [USP'U]/G
1 CREAM TOPICAL
Qty: 0 | Refills: 0 | DISCHARGE
Start: 2021-01-01

## 2021-01-01 RX ORDER — FUROSEMIDE 40 MG
80 TABLET ORAL DAILY
Refills: 0 | Status: DISCONTINUED | OUTPATIENT
Start: 2021-01-01 | End: 2021-01-01

## 2021-01-01 RX ORDER — DESMOPRESSIN ACETATE 0.1 MG/1
30 TABLET ORAL ONCE
Refills: 0 | Status: COMPLETED | OUTPATIENT
Start: 2021-01-01 | End: 2021-01-01

## 2021-01-01 RX ORDER — METOPROLOL TARTRATE 50 MG
50 TABLET ORAL
Refills: 0 | Status: DISCONTINUED | OUTPATIENT
Start: 2021-01-01 | End: 2021-01-01

## 2021-01-01 RX ORDER — ALBUTEROL 90 UG/1
2.5 AEROSOL, METERED ORAL ONCE
Refills: 0 | Status: COMPLETED | OUTPATIENT
Start: 2021-01-01 | End: 2021-01-01

## 2021-01-01 RX ORDER — PANTOPRAZOLE SODIUM 20 MG/1
80 TABLET, DELAYED RELEASE ORAL ONCE
Refills: 0 | Status: COMPLETED | OUTPATIENT
Start: 2021-01-01 | End: 2021-01-01

## 2021-01-01 RX ORDER — METOPROLOL TARTRATE 50 MG
1 TABLET ORAL
Qty: 0 | Refills: 0 | DISCHARGE
Start: 2021-01-01

## 2021-01-01 RX ORDER — HEPARIN SODIUM 5000 [USP'U]/ML
5000 INJECTION INTRAVENOUS; SUBCUTANEOUS EVERY 12 HOURS
Refills: 0 | Status: DISCONTINUED | OUTPATIENT
Start: 2021-01-01 | End: 2021-01-01

## 2021-01-01 RX ORDER — VANCOMYCIN HCL 1 G
1000 VIAL (EA) INTRAVENOUS ONCE
Refills: 0 | Status: DISCONTINUED | OUTPATIENT
Start: 2021-01-01 | End: 2021-01-01

## 2021-01-01 RX ADMIN — Medication 80 MILLIGRAM(S): at 18:03

## 2021-01-01 RX ADMIN — Medication 50 MILLIGRAM(S): at 17:17

## 2021-01-01 RX ADMIN — Medication 80 MILLIGRAM(S): at 05:15

## 2021-01-01 RX ADMIN — Medication 81 MILLIGRAM(S): at 18:03

## 2021-01-01 RX ADMIN — Medication 80 MILLIGRAM(S): at 17:42

## 2021-01-01 RX ADMIN — HEPARIN SODIUM 5000 UNIT(S): 5000 INJECTION INTRAVENOUS; SUBCUTANEOUS at 17:16

## 2021-01-01 RX ADMIN — Medication 50 MILLIGRAM(S): at 05:32

## 2021-01-01 RX ADMIN — Medication 80 MILLIGRAM(S): at 17:23

## 2021-01-01 RX ADMIN — HEPARIN SODIUM 5000 UNIT(S): 5000 INJECTION INTRAVENOUS; SUBCUTANEOUS at 18:45

## 2021-01-01 RX ADMIN — Medication 50 MILLIGRAM(S): at 16:18

## 2021-01-01 RX ADMIN — Medication 20 MILLIEQUIVALENT(S): at 05:32

## 2021-01-01 RX ADMIN — Medication 20 MILLIEQUIVALENT(S): at 17:16

## 2021-01-01 RX ADMIN — HEPARIN SODIUM 5000 UNIT(S): 5000 INJECTION INTRAVENOUS; SUBCUTANEOUS at 06:17

## 2021-01-01 RX ADMIN — NYSTATIN CREAM 1 APPLICATION(S): 100000 CREAM TOPICAL at 17:17

## 2021-01-01 RX ADMIN — Medication 20 MILLIEQUIVALENT(S): at 17:41

## 2021-01-01 RX ADMIN — Medication 20 MILLIEQUIVALENT(S): at 05:40

## 2021-01-01 RX ADMIN — Medication 80 MILLIGRAM(S): at 18:45

## 2021-01-01 RX ADMIN — HEPARIN SODIUM 5000 UNIT(S): 5000 INJECTION INTRAVENOUS; SUBCUTANEOUS at 05:15

## 2021-01-01 RX ADMIN — Medication 25 GRAM(S): at 01:50

## 2021-01-01 RX ADMIN — Medication 81 MILLIGRAM(S): at 11:34

## 2021-01-01 RX ADMIN — Medication 20 MILLIEQUIVALENT(S): at 18:45

## 2021-01-01 RX ADMIN — Medication 100 GRAM(S): at 01:14

## 2021-01-01 RX ADMIN — Medication 20 MILLIEQUIVALENT(S): at 16:16

## 2021-01-01 RX ADMIN — Medication 80 MILLIGRAM(S): at 05:27

## 2021-01-01 RX ADMIN — Medication 100 GRAM(S): at 10:56

## 2021-01-01 RX ADMIN — Medication 81 MILLIGRAM(S): at 14:26

## 2021-01-01 RX ADMIN — Medication 25 MILLIGRAM(S): at 17:41

## 2021-01-01 RX ADMIN — Medication 50 MILLIGRAM(S): at 06:32

## 2021-01-01 RX ADMIN — NYSTATIN CREAM 1 APPLICATION(S): 100000 CREAM TOPICAL at 06:46

## 2021-01-01 RX ADMIN — Medication 25 MILLIGRAM(S): at 05:28

## 2021-01-01 RX ADMIN — Medication 80 MILLIGRAM(S): at 06:32

## 2021-01-01 RX ADMIN — NYSTATIN CREAM 1 APPLICATION(S): 100000 CREAM TOPICAL at 05:32

## 2021-01-01 RX ADMIN — Medication 80 MILLIGRAM(S): at 17:17

## 2021-01-01 RX ADMIN — Medication 50 GRAM(S): at 16:15

## 2021-01-01 RX ADMIN — Medication 80 MILLIGRAM(S): at 05:45

## 2021-01-01 RX ADMIN — HEPARIN SODIUM 5000 UNIT(S): 5000 INJECTION INTRAVENOUS; SUBCUTANEOUS at 06:32

## 2021-01-01 RX ADMIN — HEPARIN SODIUM 5000 UNIT(S): 5000 INJECTION INTRAVENOUS; SUBCUTANEOUS at 17:22

## 2021-01-01 RX ADMIN — Medication 40 MILLIEQUIVALENT(S): at 20:52

## 2021-01-01 RX ADMIN — POTASSIUM PHOSPHATE, MONOBASIC POTASSIUM PHOSPHATE, DIBASIC 62.5 MILLIMOLE(S): 236; 224 INJECTION, SOLUTION INTRAVENOUS at 02:06

## 2021-01-01 RX ADMIN — Medication 20 MILLIEQUIVALENT(S): at 05:28

## 2021-01-01 RX ADMIN — ALBUTEROL 2.5 MILLIGRAM(S): 90 AEROSOL, METERED ORAL at 23:03

## 2021-01-01 RX ADMIN — HEPARIN SODIUM 5000 UNIT(S): 5000 INJECTION INTRAVENOUS; SUBCUTANEOUS at 19:30

## 2021-01-01 RX ADMIN — Medication 20 MILLIEQUIVALENT(S): at 07:00

## 2021-01-01 RX ADMIN — DESMOPRESSIN ACETATE 230 MICROGRAM(S): 0.1 TABLET ORAL at 19:37

## 2021-01-01 RX ADMIN — Medication 25 MILLIGRAM(S): at 18:45

## 2021-01-01 RX ADMIN — Medication 80 MILLIGRAM(S): at 05:32

## 2021-01-01 RX ADMIN — HEPARIN SODIUM 5000 UNIT(S): 5000 INJECTION INTRAVENOUS; SUBCUTANEOUS at 05:32

## 2021-01-01 RX ADMIN — Medication 20 MILLIEQUIVALENT(S): at 18:03

## 2021-01-01 RX ADMIN — NYSTATIN CREAM 1 APPLICATION(S): 100000 CREAM TOPICAL at 16:16

## 2021-01-01 RX ADMIN — Medication 81 MILLIGRAM(S): at 12:20

## 2021-01-01 RX ADMIN — Medication 80 MILLIGRAM(S): at 17:24

## 2021-01-01 RX ADMIN — Medication 20 MILLIEQUIVALENT(S): at 17:23

## 2021-01-01 RX ADMIN — Medication 40 MILLIEQUIVALENT(S): at 19:29

## 2021-01-01 RX ADMIN — Medication 25 MILLIGRAM(S): at 05:46

## 2021-01-01 RX ADMIN — Medication 81 MILLIGRAM(S): at 09:30

## 2021-01-01 RX ADMIN — Medication 20 MILLIEQUIVALENT(S): at 06:32

## 2021-01-01 RX ADMIN — HEPARIN SODIUM 5000 UNIT(S): 5000 INJECTION INTRAVENOUS; SUBCUTANEOUS at 05:28

## 2021-01-01 RX ADMIN — Medication 81 MILLIGRAM(S): at 10:08

## 2021-01-01 RX ADMIN — HEPARIN SODIUM 5000 UNIT(S): 5000 INJECTION INTRAVENOUS; SUBCUTANEOUS at 17:41

## 2021-01-01 RX ADMIN — Medication 25 MILLIGRAM(S): at 18:03

## 2021-01-01 RX ADMIN — Medication 40 MILLIGRAM(S): at 10:18

## 2021-01-01 RX ADMIN — PANTOPRAZOLE SODIUM 80 MILLIGRAM(S): 20 TABLET, DELAYED RELEASE ORAL at 18:15

## 2021-01-01 RX ADMIN — Medication 25 MILLIGRAM(S): at 06:17

## 2021-01-01 RX ADMIN — Medication 25 MILLIGRAM(S): at 17:23

## 2021-01-01 RX ADMIN — Medication 80 MILLIGRAM(S): at 06:17

## 2021-01-01 RX ADMIN — Medication 20 MILLIEQUIVALENT(S): at 05:46

## 2021-01-01 RX ADMIN — HEPARIN SODIUM 5000 UNIT(S): 5000 INJECTION INTRAVENOUS; SUBCUTANEOUS at 18:13

## 2021-01-01 RX ADMIN — HEPARIN SODIUM 5000 UNIT(S): 5000 INJECTION INTRAVENOUS; SUBCUTANEOUS at 05:45

## 2021-02-06 NOTE — CONSULT NOTE ADULT - ENDOCRINE
Video Visit Progress Note    Patient Name: Hina Gomez    YOB: 1991    Consent: This visit is being performed via video to discuss Video Visit (time is 1030am, pt currently at work and aware this is billed encounter) and Flank Pain (pt c/o L sided flank pain started this morning, pain is fluctuating and seems to be moving from L back more to her L side. pt is feeling nausous. pt took tylenol. pain was 5/10 earlier, it has decreased now but pain fluctuating. )    Clinician Location: [place] [address]    Hina is in [place] and her identity has been established.   She was informed that consent to treat includes permission to submit charges to the applicable insurance on file. Hina was advised regarding the potential risk inherent in video visits, as the assessment may be limited due to what can be seen on the screen which potentially results in an incomplete assessment; as well as either of us may discontinue the video visit if it is felt that the videoconferencing connections are not adequate for this patient's situation.       Chief Complaint:   Chief Complaint   Patient presents with   • Video Visit     time is 1030am, pt currently at work and aware this is billed encounter   • Flank Pain     pt c/o L sided flank pain started this morning, pain is fluctuating and seems to be moving from L back more to her L side. pt is feeling nausous. pt took tylenol. pain was 5/10 earlier, it has decreased now but pain fluctuating.        History of Present Illness: Patient requested this video visit for evaluation of above chief complaint.  Patient reports that she woke up this morning and started to have left-sided flank pain.  The pain is fluctuating and mild to moderate in intensity.  She had an episode of dry heaves as well.  It is not radiating anywhere else.  Her urine is clear she reports that she has left her frequent slightly and some foul smell coming from it.  She  is drinking a lot of water but she is not drinking up to 3 L as she had expected but she is trying her best.  She had cut down on her caffeine intake as well.  She is at work and is able to manage her activities otherwise but pain is bothering her.  She denies any fever, chills, blood with her urine, vomiting or other associated symptoms.  She had history of kidney stones and had a CT scan of her abdomen pelvis done just recently less than a month ago.  Advised her that it will not be a good idea to get her exposed to another CT scan for radiation sake as she clearly is exhibiting symptoms suggestive of another kidney stone.  There is a possibility of having a UTI as well.  Due to her work hours, she will not be able to go to the lab today to give a urine sample.  Advised her that if she can make it, she should go to the lab and I will order the UA as well.  In the meantime, we will treat her symptoms and advised her to go to emergency room if her symptoms worsen.  Verbalized understanding.  She reports that she has some Zofran at home and advised her to take it as needed if she has no nausea or vomiting.  Advised her to keep herself hydrated and use a heating pad locally in addition to everything else that I am prescribing for additional relief of symptoms.  Also encouraged the patient to take off work if symptoms get worse.  Verbalized understanding.  Other medical problems are currently stable.      Relevant past medical history, past surgical history, social history, family history reviewed in detail through chart review with the patient.     Review of Systems: As per HPI. All other systems reviewed and are negative.    Physical Examination:  Visit Vitals  [facility] 11/23/2020 (Approximate)     General: Appears stated age, in no acute cardiopulmonary distress, in mild physical distress from pain.  HEENT: Head atraumatic, normal eye movements on gross inspection  Neck: No visible JVD, no visible neck  masses  Respiratory: Normal respiratory effort,no audible wheezing.   CNS: CN II to XII grossly intact, moving all extremities without any difficulty, gait not tested.  Psychiatric: Mood and behavior is normal, affect is appropriate.      Home Medications/Current Medications:   Current Medications    CYCLOBENZAPRINE (FLEXERIL) 5 MG TABLET        INDOMETHACIN (INDOCIN) 50 MG CAPSULE    1 capsule with food or milk    LEVONORGESTREL-ETHINYL ESTRADIOL (SEASONALE) 0.15-0.03 MG PER TABLET    Take 1 tablet by mouth daily.    ONDANSETRON 4 MG FILM    1 tablet on the tongue and allow to dissolve        Relevant Labs reviewed in detail.         Assessment and Plan:  Hina was seen today for video visit and flank pain.    Diagnoses and all orders for this visit:    Acute left flank pain  -     ciprofloxacin (CIPRO) 500 MG tablet; Take 1 tablet by mouth 2 times daily for 5 days.  -     tamsulosin (FLOMAX) 0.4 MG Cap; Take 1 capsule by mouth daily for 3 days. Allergy to Sulfa antibiotic but tolerated tamsulosin in past.  -     HYDROcodone-acetaminophen (NORCO) 5-325 MG per tablet; Take 1 tablet by mouth every 6 hours as needed for Pain.  -     ibuprofen (MOTRIN) 600 MG tablet; Take 1 tablet by mouth every 6 hours as needed for Pain.  -     URINALYSIS & REFLEX MICROSCOPY WITH CULTURE IF INDICATED    Kidney stone on left side  -     ciprofloxacin (CIPRO) 500 MG tablet; Take 1 tablet by mouth 2 times daily for 5 days.  -     tamsulosin (FLOMAX) 0.4 MG Cap; Take 1 capsule by mouth daily for 3 days. Allergy to Sulfa antibiotic but tolerated tamsulosin in past.  -     HYDROcodone-acetaminophen (NORCO) 5-325 MG per tablet; Take 1 tablet by mouth every 6 hours as needed for Pain.  -     ibuprofen (MOTRIN) 600 MG tablet; Take 1 tablet by mouth every 6 hours as needed for Pain.  -     URINALYSIS & REFLEX MICROSCOPY WITH CULTURE IF INDICATED    Increased urinary frequency  -     URINALYSIS & REFLEX MICROSCOPY WITH CULTURE IF  INDICATED    Foul smelling urine  -     URINALYSIS & REFLEX MICROSCOPY WITH CULTURE IF INDICATED           Care plan was discussed with the patient in detail. All of patient's questions were answered to the best of my knowledge about patient's case. Verbalized understanding and is in agreement with the plan of care. Reassurance provided.      Advised her to go to ER if her symptoms worsen.    5-10 minutes were spent in this encounter.    Alyssa Kruse MD  Internal Medicine  [facility]  Phone: [phone]  Fax: [phone]     negative

## 2021-03-10 NOTE — ED ADULT NURSE NOTE - TEMPLATE
responded to in basket request for Advance Medical Directive (AMD) consult in the ICU. Pt, Miss Lillian Augustin was about to have her lunch, her daughter was present, fixing her lunch.  left a copy of AMD document and advised them to call  through nurse when ready for AMD consult. Pt and daughter thanked  for the visit. Visited by: Blake Rsoas.    can be reached by calling the  at [facility]  [phone]
Cardiac

## 2021-08-03 NOTE — ED PROVIDER NOTE - QRS
Physical Therapy  Facility/Department: [facility] 3W ORTHOPEDICS  Daily Treatment Note  NAME: Verito Sheth  : 1945  MRN: [id number]    Date of Service: 8/3/2021    Discharge Recommendations:  Patient would benefit from continued therapy after discharge, 3-5 sessions per week   PT Equipment Recommendations  Other: defer to next level of care  Verito Sheth scored a 18/24 on the AM-PAC short mobility form. Current research shows that an AM-PAC score of 17 or less is typically not associated with a discharge to the patient's home setting, however, patient has history of falls and inability to get up, is unsafe to return home, and would benefit from additional therapy. Based on the patient's AM-PAC score and their current functional mobility deficits, it is recommended that the patient have 3-5 sessions per week of Physical Therapy at d/c to increase the patient's independence. Please see assessment section for further patient specific details. If patient discharges prior to next session this note will serve as a discharge summary. Please see below for the latest assessment towards goals. Assessment   Body structures, Functions, Activity limitations: Decreased functional mobility ; Decreased balance  Assessment: The patient is a 77 y.o. female who presents to [facility] with altered mental status. Patient has a history of dementia, ulcerative colitis, hypertension, hyperlipidemia who presents to the emergency department on 21 with altered mental status for a few days. Patient lives with her son after recent admission to the hospital followed by a short stay at a SNF. Prior to admission, pt living with son in house - reports she was independent with all ADLs, IADLs, and ambulation without device, however she reports she falls 5 times per week and is unable to get self up.    Pt currently able to perform bed mobility with supervision, sit<>stand with CGA/SBA, and ambulated up to 100' with CGA/SBA with wheeled walker and issues with safety concerns especially in tighter areas. Patient's mentation and lack of awareness for safety likely contributing to recurrent falls. The pt will need 24hr assist and would benefit from continued skilled therapy 3-5x/week. Treatment Diagnosis: impaired dynamic balance  Prognosis: Fair  Decision Making: Medium Complexity  History: see below  Exam: see below  Clinical Presentation: evolving  PT Education: PT Role;Plan of Care;Transfer Training;General Safety; Functional Mobility Training;Gait Training  Patient Education: walker safety  REQUIRES PT FOLLOW UP: Yes  Activity Tolerance  Activity Tolerance: Patient Tolerated treatment well  Activity Tolerance: very hard of hearing and needed instructions repeated several times, noted safety concerns with patient being alone at home     Patient Diagnosis(es): The primary encounter diagnosis was Diarrhea, unspecified type. Diagnoses of Abdominal pain, epigastric, Dehydration, and Hypokalemia were also pertinent to this visit. has a past medical history of Acute MI ([facility] [address] Eczema, [place] (hard of hearing), Hypercholesteremia, Hypertension, Pacemaker, Ulcerative colitis, and Wears hearing aid. has a past surgical history that includes Hysterectomy (1996); Pacemaker insertion; Coronary angioplasty with stent; Ankle fracture surgery (7/26/11); rhinoplasty; Upper gastrointestinal endoscopy (N/A, 1/20/2020); and Colonoscopy (N/A, 1/20/2020). Restrictions  Restrictions/Precautions  Restrictions/Precautions: Fall Risk  Subjective   General  Chart Reviewed: Yes  Additional Pertinent Hx: The patient is a 77 y.o. female who presents to [facility] with altered mental status. Patient has a history of dementia, ulcerative colitis, hypertension, hyperlipidemia who presents to the emergency department on 7/28/21 with altered mental status for a few days.   Patient lives with her son after recent admission to the hospital followed by a short stay at a SNF. Response To Previous Treatment: Patient reporting fatigue but able to participate. Referring Practitioner: Brennan Felty, MD  Subjective  Subjective: Patient resting in bed, agreeable to therapy, hard of hearing and needed repetition with questions/instructions. Denies pain. Orientation  Orientation  Overall Orientation Status: Impaired  Orientation Level: Oriented to place;Oriented to situation;Disoriented to time;Oriented to person (reports on [facility], but did not state name of hospital - hard of hearing)  Cognition   Cognition  Overall Cognitive Status: Exceptions  Arousal/Alertness: Appropriate responses to stimuli  Following Commands:  Follows one step commands with repetition  Attention Span: Attends with cues to redirect  Safety Judgement: Decreased awareness of need for safety;Decreased awareness of need for assistance  Insights: Not aware of deficits  Initiation: Requires cues for some  Cognition Comment: hard of hearing, needs instructions/questions repeated frequently  Objective   Bed mobility  Supine to Sit: Modified independent  Sit to Supine: Unable to assess (in recliner at end of session)  Scooting: Independent  Transfers  Sit to Stand: Contact guard assistance;Stand by assistance  Stand to sit: Contact guard assistance;Stand by assistance  Comment: cues for walker safety, tends to place walker to side  Ambulation  Ambulation?: Yes  Ambulation 1  Surface: level tile  Device: Rolling Walker  Assistance: Stand by assistance;Contact guard assistance  Quality of Gait: several turns, on/off carpet, incresed difficulty safely managing wheeled walker in tighter areas  Gait Deviations: Decreased step length;Decreased step height  Distance: 100' x 2  Comments: cues for walker safety, no overt loss of balance        Exercises  Hip Flexion: 20, BLEs  Knee Long Arc Quad: 20, BLEs  Ankle Pumps: 20, BLEs         Other Activities: Other (see comment)  Comment: Toilet transfers with SBA using grab bar, able to manage undergarment down and up and wash hands with SBA for standing balance. AM-PAC Score  AM-PAC Inpatient Mobility Raw Score : 18 (08/03/21 0945)  AM-PAC Inpatient T-Scale Score : 43.63 (08/03/21 0945)  Mobility Inpatient CMS 0-100% Score: 46.58 (08/03/21 0945)  Mobility Inpatient CMS G-Code Modifier : CK (08/03/21 0945)          Goals  Short term goals  Time Frame for Short term goals: by acute discharge  Short term goal 1: sit<>stand independently  Short term goal 2: ambulate > 50' independently  Patient Goals   Patient goals : none stated    Plan    Plan  Times per week: 3-5x/week while on acute, recommend 3-5  Current Treatment Recommendations: Balance Training, Functional Mobility Training, Gait Training, Neuromuscular Re-education, Safety Education & Training, Patient/Caregiver Education & Training  Safety Devices  Type of devices:  All fall risk precautions in place, Call light within reach, Gait belt, Patient at risk for falls, Left in chair, Chair alarm in place, Nurse notified     Therapy Time   Individual Concurrent Group Co-treatment   Time In [address]         Time Out 0950         Minutes 40         Timed Code Treatment Minutes: [address], [address] No

## 2021-08-04 NOTE — ED PROVIDER NOTE - ATTENDING CONTRIBUTION TO CARE
pt here due to SOB. pt unable to provide hx due to ams. pt has been in and out of hospitals in [place] and has been treated for a variety of things including cfh, afib, pleural effusion, more remotely a stroke. pt's family brought him to [facility] to be closer to him and for continued tx of his symptoms. pt is tachypenic and hypoxic on arrival, altered (has been his baseline per family over last few months, and tends to fluctuate, currently on the more altered side of his baseline), following commands although with redirection, 1+ edema of both legs. pt will require a w/u to r/o acute pulmonary, infectious, or cardiac pathology and will require admission for further care given hypoxia as well as management of his many chronic medical problems. see MDM above for my attestation statement

## 2021-08-04 NOTE — ED PROVIDER NOTE - OBJECTIVE STATEMENT
85 yo male with pmh htn, hld, CHF here for worsening confusion and sob. Per pts daughter over the phone, pt recently admitted to the hospital in [place], moved up here for medical attention and in interim developed worsening sob. pt non contributory to history, daughter denies recent fevers, chills, difficulty tolerating po. No recent falls.

## 2021-08-04 NOTE — ED ADULT NURSE REASSESSMENT NOTE - NS ED NURSE REASSESS COMMENT FT1
pt reoriented due to confusion and stating "I have to go". Patient becoming more calm, now resting. Awaiting bed assignment. Safety measures in place.

## 2021-08-04 NOTE — ED PROVIDER NOTE - PROGRESS NOTE DETAILS
pts ddimer 770, when age adjusted is neg, (significant ddimer 850 in 87 yo patient). -Serina Darling PA-C

## 2021-08-04 NOTE — ED PROVIDER NOTE - CARE PLAN
Principal Discharge DX:	Congestive heart failure, unspecified HF chronicity, unspecified heart failure type

## 2021-08-04 NOTE — ED ADULT NURSE REASSESSMENT NOTE - NS ED NURSE REASSESS COMMENT FT1
report received from [facility] AKHTAR. Patient is a/ox1, confused to time, place, situation. Patient has inappropriate speech, that does not pertain to situation and current status. Audible wheezing heard to lung fields. 2L NC applied due to low O2 sat of 88%, now 98%. Multiple wounds noted to BL elbows, and heels with dressing in place. Patient changed due to bowel incontinence, nonblancheable redness noted sacral region. Changed and repositioned for comfort. Safety measures in place. IV20g placed to right ac, labs drawn and sent. No

## 2021-08-04 NOTE — H&P ADULT - HISTORY OF PRESENT ILLNESS
87 yo male with pmh htn, hld, CHF here for worsening confusion and sob. Per pts daughter over the phone, pt recently admitted to the hospital in [place], moved up here for medical attention and in interim developed worsening sob. pt non contributory to history, daughter denies recent fevers, chills, difficulty tolerating po. No recent falls. Chest X ray shows pulm vascular congestion. He also has elevated d dimer to 770.

## 2021-08-04 NOTE — ED ADULT NURSE NOTE - CAS TRG GEN SKIN COLOR
The patient pushed effectively and delivered a 7 lb 15 oz vigorous infant over intact perineum. Head delivered KOKO Shoulders and body delivered easily thereafter.Nose and mouth were bulb suctioned,  Baby was placed on maternal abdomen.  Cord was clamped and cut after one minute of life.  The placenta delivered spontaneously intact. Perineum and vagina were inspected and second degree lac repaired in layers using 2 0 and 3 0 vicryl. . Uterus was firm and bleeding was minimal. Patient and  recovering in stable condition.  cc. Apgars 8 and 8     Normal for race

## 2021-08-04 NOTE — ED PROVIDER NOTE - CLINICAL SUMMARY MEDICAL DECISION MAKING FREE TEXT BOX
pt here due to SOB. pt unable to provide hx due to ams. pt has been in and out of hospitals in [place] and has been treated for a variety of things including cfh, afib, pleural effusion, more remotely a stroke. pt's family brought him to [facility] to be closer to him and for continued tx of his symptoms. pt is tachypenic and hypoxic on arrival, altered (has been his baseline per family over last few months, and tends to fluctuate, currently on the more altered side of his baseline), following commands although with redirection, 1+ edema of both legs. pt will require a w/u to r/o acute pulmonary, infectious, or cardiac pathology and will require admission for further care given hypoxia as well as management of his many chronic medical problems.

## 2021-08-04 NOTE — ED PROVIDER NOTE - PHYSICAL EXAMINATION
A&Ox1, NAD. NCAT. PERRL, EOMI. Neck supple, no LAD. + diminished lung fields, +S1S2, RRR, No m/r/g. Abd soft, NT/ND, +BS, no rebound or guarding. Extremities: cap refill <2, pulses in distal extremities 4+, + pedal edema, Skin without rash. CN II-XII intact. Strength 5/5 UE/LE. Sensations intact throughout.

## 2021-08-04 NOTE — ED ADULT NURSE NOTE - OBJECTIVE STATEMENT
86 year old Male, brought in via transportation service from [place]. Patient was on his way home and patient was shortness of breath so family wanted him brought to the ER. 86 year old Male, PMH: stroke, pleural effusion, brought in via transportation service from [place]. Patient was on his way home and presented short of breath so family wanted him brought to the ER. 88& on room air. Upon arrival patient is agitated, dry mouth, confused, A&Ox1 to self. Presents with baig catheter, bilateral lower extremity swelling, bilateral heel ulcers, sinus rhythm on cardiac montior, breathing spontaneous on 2L nasal cannula, palpable pulses with bruising and skin tears to bilateral arms. Patient shows no signs of pain. Placed in red socks with side rails up for safety, bed locked and in lowest position. Daughter at bedside.

## 2021-08-05 NOTE — PROGRESS NOTE ADULT - SUBJECTIVE AND OBJECTIVE BOX
Cardiology    HISTORY OF PRESENT ILLNESS: HPI:  87 yo male with pmh htn, hld, CHF here for worsening confusion and sob. Per pts daughter over the phone, pt recently admitted to the hospital in [place], moved up here for medical attention and in interim developed worsening sob. pt non contributory to history, daughter denies recent fevers, chills, difficulty tolerating po. No recent falls. Chest X ray shows pulm vascular congestion. He also has elevated d dimer to 770. (04 Aug 2021 13:33)    Mr Bryant is a pleasant 86yoM recently brought up from [place] for medical care closer to his family. He has been hospitalized a few times for CHF.  He is oriented only to name and does not know what his medical problems are.  He states he has not walked in some time, he is having shortness of breath, fatigue, difficulty lying flat.  10pt ROS attempted but patient is admittedly unsure about much of his symptoms.    Per daughter (Barbara, [phone])  Moved to [place] x 2 yrs after traveling back and forth for many years.  In May 2021, was living independently, mildly forgetful but short-tempered.  Was still driving and doing errands.  Family has been looking into assisted living in [facility], trying to transition him before serious illness but he refused xfer.  Had back to back falls / stuck on the floor 6/2021 two days apart.  By 6/15, went to rehab.   In and out of [facility] 3x in 6 weeks    8/5- uneventful overnight.  difficulty hearing, but states he feels OK today.  no angina or palpitations.    PAST MEDICAL & SURGICAL HISTORY:  MI age 42  CHF  Watchman- ~2015  Atrial Fibrillation (chronic, permanent)  Myelodysplastic syndrome ?  Stroke 8/202  Diabetes, not on insulin    furosemide   Injectable 80 milliGRAM(s) IV Push two times a day  heparin   Injectable 5000 Unit(s) SubCutaneous every 12 hours  potassium chloride   Powder 20 milliEquivalent(s) Oral two times a day                        8.6    7.09  )-----------( 95       ( 05 Aug 2021 07:36 )             29.5       08-05    140  |  106  |  29<H>  ----------------------------<  123<H>  3.8   |  22  |  1.91<H>    Ca    8.1<L>      05 Aug 2021 07:37  Mg     1.5     08-05    TPro  5.8<L>  /  Alb  2.9<L>  /  TBili  0.5  /  DBili  x   /  AST  18  /  ALT  14  /  AlkPhos  275<H>  08-05    T(C): 36.6 (08-05-21 @ 11:46), Max: 36.7 (08-05-21 @ 04:10)  HR: 105 (08-05-21 @ 11:46) (85 - 105)  BP: 116/73 (08-05-21 @ 11:46) (116/73 - 147/73)  RR: 18 (08-05-21 @ 11:46) (18 - 18)  SpO2: 99% (08-05-21 @ 11:46) (99% - 100%)  Wt(kg): --    I&O's Summary    04 Aug 2021 07:01  -  05 Aug 2021 07:00  --------------------------------------------------------  IN: 240 mL / OUT: 2800 mL / NET: -2560 mL    05 Aug 2021 07:01  -  05 Aug 2021 12:20  --------------------------------------------------------  IN: 180 mL / OUT: 0 mL / NET: 180 mL    Appearance: calm and pleasant elderly white male, oriented to name only  HEENT:   Normal oral mucosa, PERRL, EOMI	  Lymphatic: No lymphadenopathy , no edema  Cardiovascular: irregular S1 S2, + JVD, No murmurs , Peripheral pulses palpable 1+ bilaterally  Respiratory: poor air entry BL, rales.  Gastrointestinal:  Soft, Non-tender, + BS	  Skin: No rashes, No ecchymoses, No cyanosis, warm to touch, diffuse bruising  Musculoskeletal: limited ROM due to edema  Psychiatry:  Mood is "im fine" & affect is restricted    TELEMETRY: AFib, RBBB, HR controlled.	    ECG:  AF, RBBB  Echo: Hyperdynamic (EF >75%), LVH, calcified aortic valve without AS, mild MR, dilated LA, elevated RA pressure.    ASSESSMENT/PLAN: 	86y Male admitted w/ anasarca, urgently dropped off by family who drove him back from [place].  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 Stroke 1 yr ago, recent falls and rapid deterioration.    Start Aspirin 81mg daily for Watchman, stroke secondary prevention.  Lasix adjusted to 80mg IV BID.  Replace add'l KCl and Magnesium (target K 4-4.5, Mg 2), as he will need extensive diuresis.  OK to add a low-dose beta blocker, his diastolic CHF will perform better with HR all < .  Suggest Metoprolol tartrate 25mg BID.  Will follow.    Family requesting palliative care consult.  States he is a DNR.  Requesting MOLST form to be filled out together.  Daughter, Barbara     Dennis Hickman M.D.  Cardiac Electrophysiology    office [phone]  pager [phone]

## 2021-08-05 NOTE — PROGRESS NOTE ADULT - SUBJECTIVE AND OBJECTIVE BOX
Podiatry pager #: [phone]/ [phone]    Patient is a 86y old  Male who presents with a chief complaint of Dyspnea (05 Aug 2021 20:41)Podiatry consult today for bilateral foot wounds      HPI:  85 yo male with pmh htn, hld, CHF here for worsening confusion and sob. Per pts daughter over the phone, pt recently admitted to the hospital in [place], moved up here for medical attention and in interim developed worsening sob. pt non contributory to history, daughter denies recent fevers, chills, difficulty tolerating po. No recent falls. Chest X ray shows pulm vascular congestion. He also has elevated d dimer to 770. (04 Aug 2021 13:33)      PAST MEDICAL & SURGICAL HISTORY:      MEDICATIONS  (STANDING):  aspirin  chewable 81 milliGRAM(s) Oral daily  furosemide   Injectable 80 milliGRAM(s) IV Push two times a day  heparin   Injectable 5000 Unit(s) SubCutaneous every 12 hours  metoprolol tartrate 25 milliGRAM(s) Oral two times a day  potassium chloride   Powder 20 milliEquivalent(s) Oral two times a day    MEDICATIONS  (PRN):      Allergies    Allergy Status Unknown    Intolerances        VITALS:    Vital Signs Last 24 Hrs  T(C): 36.4 (06 Aug 2021 05:37), Max: 36.5 (05 Aug 2021 21:58)  T(F): 97.5 (06 Aug 2021 05:37), Max: 97.7 (05 Aug 2021 21:58)  HR: 89 (06 Aug 2021 05:37) (89 - 94)  BP: 157/85 (06 Aug 2021 05:37) (134/72 - 157/85)  BP(mean): --  RR: 18 (06 Aug 2021 05:37) (18 - 18)  SpO2: 99% (06 Aug 2021 05:37) (99% - 100%)    LABS:                          9.0    8.35  )-----------( 105      ( 06 Aug 2021 07:22 )             30.6       08-06    137  |  103  |  27<H>  ----------------------------<  134<H>  4.1   |  23  |  1.75<H>    Ca    8.5      06 Aug 2021 07:22  Phos  1.9     08-06  Mg     1.8     08-06    TPro  6.0  /  Alb  2.9<L>  /  TBili  0.4  /  DBili  x   /  AST  25  /  ALT  17  /  AlkPhos  304<H>  08-06      CAPILLARY BLOOD GLUCOSE              LOWER EXTREMITY PHYSICAL EXAM:    Vasular: DP/PT _1/4, B/L, CFT <_3 seconds B/L, Temperature gradient wnl, B/L.   Neuro: Epicritic sensation _diminished to the level of _toes, B/L.  Skin:  Wound #1:   Location: left heel  Size: 1cm diameter  Depth: .2cm  Wound bed: fibrogranular  Drainage:  minimal serous  Odor: none  Periwound: no clinical signs of infection  Etiology: present on admission     Wound #2:   Location: right dorsal midfoot  Size: .7cm   Depth: .1cm   Wound bed: granular  Drainage: minimal serous  Odor: none  Periwound: no clinical signs of infection  Etiology: present on admission    right second toe abrasion pipj with superficial eschar--stable/dry    RADIOLOGY & ADDITIONAL STUDIES:

## 2021-08-05 NOTE — PROGRESS NOTE ADULT - SUBJECTIVE AND OBJECTIVE BOX
[facility]   MD Neo Stein MD, D.O. Ruoru Wong, PA    From 7 AM - 5 PM:  OFFICE: [phone]  Dr. Yuan cell: [phone]  Dr. Gilliam cell: [phone]  Dr. Baez cell: [phone]  MAYA Jones cell: [phone]    From 5 PM - 7 AM: Answering Service: [phone]  Date of service: 08-05-21 @ 11:49    RENAL FOLLOW UP NOTE  --------------------------------------------------------------------------------  HPI:  Pt seen and examined at bedside.   Lisa SOB, chest pain     PAST HISTORY  --------------------------------------------------------------------------------  No significant changes to PMH, PSH, FHx, SHx, unless otherwise noted    ALLERGIES & MEDICATIONS  --------------------------------------------------------------------------------  Allergies    Allergy Status Unknown    Intolerances      Standing Inpatient Medications  furosemide   Injectable 80 milliGRAM(s) IV Push two times a day  heparin   Injectable 5000 Unit(s) SubCutaneous every 12 hours  potassium chloride   Powder 20 milliEquivalent(s) Oral two times a day    PRN Inpatient Medications      REVIEW OF SYSTEMS  --------------------------------------------------------------------------------  General: no fever  CVS: no chest pain  RESP: no sob, no cough  ABD: no abdominal pain  : no dysuria  MSK: no edema     VITALS/PHYSICAL EXAM  --------------------------------------------------------------------------------  T(C): 36.6 (08-05-21 @ 11:46), Max: 36.7 (08-05-21 @ 04:10)  HR: 105 (08-05-21 @ 11:46) (85 - 105)  BP: 116/73 (08-05-21 @ 11:46) (116/73 - 147/73)  RR: 18 (08-05-21 @ 11:46) (18 - 18)  SpO2: 99% (08-05-21 @ 11:46) (99% - 100%)  Wt(kg): --      08-04-21 @ 07:01  -  08-05-21 @ 07:00  --------------------------------------------------------  IN: 240 mL / OUT: 2800 mL / NET: -2560 mL    08-05-21 @ 07:01  -  08-05-21 @ 11:49  --------------------------------------------------------  IN: 180 mL / OUT: 0 mL / NET: 180 mL      Physical Exam:  	Gen: NAD  	HEENT: MMM  	Pulm: CTA B/L  	CV: S1S2  	Abd: Soft, +BS  	Ext: No LE edema B/L                      Neuro: Awake   	Skin: Warm and Dry     LABS/STUDIES  --------------------------------------------------------------------------------              8.6    7.09  >-----------<  95       [08-05-21 @ 07:36]              29.5     140  |  106  |  29  ----------------------------<  123      [08-05-21 @ 07:37]  3.8   |  22  |  1.91        Ca     8.1     [08-05-21 @ 07:37]      Mg     1.5     [08-05-21 @ 07:37]    TPro  5.8  /  Alb  2.9  /  TBili  0.5  /  DBili  x   /  AST  18  /  ALT  14  /  AlkPhos  275  [08-05-21 @ 07:37]    Creatinine Trend:  SCr 1.91 [08-05 @ 07:37]  SCr 1.93 [08-04 @ 08:07]    Urinalysis - [08-04-21 @ 19:30]      Color Colorless / Appearance Clear / SG 1.007 / pH 6.0      Gluc Negative / Ketone Negative  / Bili Negative / Urobili Negative       Blood Trace / Protein Negative / Leuk Est Negative / Nitrite Negative      RBC 3 / WBC 0 / Hyaline 1 / Gran  / Sq Epi  / Non Sq Epi 0 / Bacteria Negative

## 2021-08-05 NOTE — CONSULT NOTE ADULT - PROBLEM SELECTOR RECOMMENDATION 9
He reports 6/10 on ESAS  Likely due to loss of hearing aids  Communication board provided  Contact [facility] about external hearing devices, none are available  d/w assistant nurse manager  Consider holistic nurse referral

## 2021-08-05 NOTE — PROGRESS NOTE ADULT - SUBJECTIVE AND OBJECTIVE BOX
Patient is a 86y old  Male who presents with a chief complaint of Dyspnea (05 Aug 2021 13:06)      HPI:  Denies dyspnea, feels better. [place]. DNR signed with assistance from palliative care team.    PAST MEDICAL & SURGICAL HISTORY:      Review of Systems:   CONSTITUTIONAL: No fever, weight loss, or fatigue  EYES: No eye pain, visual disturbances, or discharge  ENMT:  No difficulty hearing, tinnitus, vertigo; No sinus or throat pain  NECK: No pain or stiffness  BREASTS: No pain, masses, or nipple discharge  RESPIRATORY: No cough, wheezing, chills or hemoptysis; No shortness of breath  CARDIOVASCULAR: No chest pain, palpitations, dizziness, or leg swelling  GASTROINTESTINAL: No abdominal or epigastric pain. No nausea, vomiting, or hematemesis; No diarrhea or constipation. No melena or hematochezia.  GENITOURINARY: No dysuria, frequency, hematuria, or incontinence  NEUROLOGICAL: No headaches, memory loss, loss of strength, numbness, or tremors  SKIN: No itching, burning, rashes, or lesions   LYMPH NODES: No enlarged glands  ENDOCRINE: No heat or cold intolerance; No hair loss  MUSCULOSKELETAL: No joint pain or swelling; No muscle, back, or extremity pain  PSYCHIATRIC: No depression, anxiety, mood swings, or difficulty sleeping  HEME/LYMPH: No easy bruising, or bleeding gums  ALLERY AND IMMUNOLOGIC: No hives or eczema    Allergies    Allergy Status Unknown    Intolerances        Social History:     FAMILY HISTORY:      MEDICATIONS  (STANDING):  aspirin  chewable 81 milliGRAM(s) Oral daily  furosemide   Injectable 80 milliGRAM(s) IV Push two times a day  heparin   Injectable 5000 Unit(s) SubCutaneous every 12 hours  metoprolol tartrate 25 milliGRAM(s) Oral two times a day  potassium chloride   Powder 20 milliEquivalent(s) Oral two times a day    MEDICATIONS  (PRN):        CAPILLARY BLOOD GLUCOSE        I&O's Summary    04 Aug 2021 07:  -  05 Aug 2021 07:00  --------------------------------------------------------  IN: 240 mL / OUT: 2800 mL / NET: -2560 mL    05 Aug 2021 07:01  -  05 Aug 2021 20:42  --------------------------------------------------------  IN: 960 mL / OUT: 900 mL / NET: 60 mL        PHYSICAL EXAM:  Vital Signs Last 24 Hrs  T(C): 36.4 (05 Aug 2021 17:42), Max: 36.7 (05 Aug 2021 04:10)  T(F): 97.5 (05 Aug 2021 17:42), Max: 98.1 (05 Aug 2021 04:10)  HR: 93 (05 Aug 2021 17:42) (85 - 105)  BP: 134/72 (05 Aug 2021 17:42) (116/73 - 136/64)  BP(mean): --  RR: 18 (05 Aug 2021 17:42) (18 - 18)  SpO2: 100% (05 Aug 2021 17:42) (99% - 100%)    GENERAL: NAD, well-developed  HEAD:  Atraumatic, Normocephalic  EYES: EOMI, PERRLA, conjunctiva and sclera clear  NECK: Supple, No JVD  CHEST/LUNG: Clear to auscultation bilaterally; No wheeze  HEART: Regular rate and rhythm; No murmurs, rubs, or gallops  ABDOMEN: Soft, Nontender, Nondistended; Bowel sounds present  EXTREMITIES:  2+ Peripheral Pulses, No clubbing, cyanosis, or edema  PSYCH: AAOx3  NEUROLOGY: non-focal  SKIN: No rashes or lesions    LABS:                        8.6    7.09  )-----------( 95       ( 05 Aug 2021 07:36 )             29.5     08-05    140  |  106  |  29<H>  ----------------------------<  123<H>  3.8   |  22  |  1.91<H>    Ca    8.1<L>      05 Aug 2021 07:37  Mg     1.5     08-05    TPro  5.8<L>  /  Alb  2.9<L>  /  TBili  0.5  /  DBili  x   /  AST  18  /  ALT  14  /  AlkPhos  275<H>  08-05          Urinalysis Basic - ( 04 Aug 2021 19:30 )    Color: Colorless / Appearance: Clear / S.007 / pH: x  Gluc: x / Ketone: Negative  / Bili: Negative / Urobili: Negative   Blood: x / Protein: Negative / Nitrite: Negative   Leuk Esterase: Negative / RBC: 3 /hpf / WBC 0 /HPF   Sq Epi: x / Non Sq Epi: 0 /hpf / Bacteria: Negative        RADIOLOGY & ADDITIONAL TESTS:    Imaging Personally Reviewed:    Consultant(s) Notes Reviewed:      Care Discussed with Consultants/Other Providers:

## 2021-08-05 NOTE — PROGRESS NOTE ADULT - PROBLEM SELECTOR PLAN 1
Diastolic HF noted. On Lasix. Cardiology FU noted. Palliative eval noted. DNR signed. Patient is [facility].

## 2021-08-05 NOTE — CONSULT NOTE ADULT - PROBLEM SELECTOR RECOMMENDATION 5
Actions:  [x] Rapport building     [x] Symptom assessment    [x] Eliciting preferences of goals   [] Prognostic understanding    [] Emotional Support  [] Coping skill development  []  Other  Interdisciplinary Referrals: Holistic nurse  Communication: d/w patient and son  Documentation Review: [x] Primary Team [] Consultants [] Interdisciplinary team    Pt discussed his preferences for medical communication and her would like to receive all information about his medical care; he would also like his family to be equally informed as well.  The patient has a shared medical decision making model with his family and he would like to make decisions in concert with his children.  The overarching goal of the patient and son is to have medical care at [facility] to address reversible causes if possible. Unlikely to be a home hospice candidate at this time.  Not a PCU candidate at this time.    ACP Time >16 min    Would recommend that a HCP form be filled out in the event the patient loses capacity.

## 2021-08-05 NOTE — CONSULT NOTE ADULT - PROBLEM SELECTOR RECOMMENDATION 4
Condition: HF documented  Degree: EF 70-75%  Active treatment: Cardiology following, they recommended beta blocker  Clinical impact on complexity: Significant

## 2021-08-05 NOTE — GOALS OF CARE CONVERSATION - ADVANCED CARE PLANNING - CONVERSATION DETAILS
Daughter at bedside & son-Ernie included via phone conversation---established Ernie Bryant as the HCP (healthcare proxy form filled out and placed in chart). GOC established--patient is DNR/DNI, no artificial feeding tubes, amenable trial of pressors as needed (depending on prognosis as outlined w/family & on MOLST form).

## 2021-08-05 NOTE — CONSULT NOTE ADULT - PROBLEM SELECTOR RECOMMENDATION 3
PPSv2 prior to admission: Additional collateral information needed  Current functional PPSv2: 50  Nursing care required: some assistance with ADLs  Code status documented: Full code  A review of the paper chart has been conducted  HCP form present:               Yes and valid []               Yes but invalid []                No [x]   MOLST present:                   Yes and valid []               Yes but invalid []                No [x]-blank  Incapacity form present:   Yes and valid []                Yes but invalid []                No []                 N/A [x]  Living Will:                            Yes and valid []                Yes but invalid []                No [x]      Family Health Care Decision Act (FHCDA) Surrogate Decision Maker Hierarchy in the absence of a health care agent  [facility] Article 81 Guardian-->Spouse or domestic partner--> Adult child-->Parent-->Sibling--> Close friend

## 2021-08-06 NOTE — PROGRESS NOTE ADULT - SUBJECTIVE AND OBJECTIVE BOX
Patient is a 86y old  Male who presents with a chief complaint of Dyspnea (05 Aug 2021 13:06)      HPI:  Denies dyspnea, feels better. [place].     PAST MEDICAL & SURGICAL HISTORY:      Review of Systems:   CONSTITUTIONAL: No fever, weight loss, or fatigue  EYES: No eye pain, visual disturbances, or discharge  ENMT:  No difficulty hearing, tinnitus, vertigo; No sinus or throat pain  NECK: No pain or stiffness  BREASTS: No pain, masses, or nipple discharge  RESPIRATORY: No cough, wheezing, chills or hemoptysis; No shortness of breath  CARDIOVASCULAR: No chest pain, palpitations, dizziness, or leg swelling  GASTROINTESTINAL: No abdominal or epigastric pain. No nausea, vomiting, or hematemesis; No diarrhea or constipation. No melena or hematochezia.  GENITOURINARY: No dysuria, frequency, hematuria, or incontinence  NEUROLOGICAL: No headaches, memory loss, loss of strength, numbness, or tremors  SKIN: No itching, burning, rashes, or lesions   LYMPH NODES: No enlarged glands  ENDOCRINE: No heat or cold intolerance; No hair loss  MUSCULOSKELETAL: No joint pain or swelling; No muscle, back, or extremity pain  PSYCHIATRIC: No depression, anxiety, mood swings, or difficulty sleeping  HEME/LYMPH: No easy bruising, or bleeding gums  ALLERY AND IMMUNOLOGIC: No hives or eczema    Allergies    Allergy Status Unknown    Intolerances        Social History:     FAMILY HISTORY:      MEDICATIONS  (STANDING):  aspirin  chewable 81 milliGRAM(s) Oral daily  furosemide   Injectable 80 milliGRAM(s) IV Push two times a day  heparin   Injectable 5000 Unit(s) SubCutaneous every 12 hours  metoprolol tartrate 25 milliGRAM(s) Oral two times a day  potassium chloride   Powder 20 milliEquivalent(s) Oral two times a day    MEDICATIONS  (PRN):        CAPILLARY BLOOD GLUCOSE        I&O's Summary    04 Aug 2021 07:  -  05 Aug 2021 07:00  --------------------------------------------------------  IN: 240 mL / OUT: 2800 mL / NET: -2560 mL    05 Aug 2021 07:01  -  05 Aug 2021 20:42  --------------------------------------------------------  IN: 960 mL / OUT: 900 mL / NET: 60 mL        PHYSICAL EXAM:  Vital Signs Last 24 Hrs  T(C): 36.4 (05 Aug 2021 17:42), Max: 36.7 (05 Aug 2021 04:10)  T(F): 97.5 (05 Aug 2021 17:42), Max: 98.1 (05 Aug 2021 04:10)  HR: 93 (05 Aug 2021 17:42) (85 - 105)  BP: 134/72 (05 Aug 2021 17:42) (116/73 - 136/64)  BP(mean): --  RR: 18 (05 Aug 2021 17:42) (18 - 18)  SpO2: 100% (05 Aug 2021 17:42) (99% - 100%)    GENERAL: NAD, well-developed  HEAD:  Atraumatic, Normocephalic  EYES: EOMI, PERRLA, conjunctiva and sclera clear  NECK: Supple, No JVD  CHEST/LUNG: Clear to auscultation bilaterally; No wheeze  HEART: Regular rate and rhythm; No murmurs, rubs, or gallops  ABDOMEN: Soft, Nontender, Nondistended; Bowel sounds present  EXTREMITIES:  2+ Peripheral Pulses, No clubbing, cyanosis, or edema  PSYCH: AAOx3  NEUROLOGY: non-focal  SKIN: No rashes or lesions    LABS:                        8.6    7.09  )-----------( 95       ( 05 Aug 2021 07:36 )             29.5     08-05    140  |  106  |  29<H>  ----------------------------<  123<H>  3.8   |  22  |  1.91<H>    Ca    8.1<L>      05 Aug 2021 07:37  Mg     1.5     08-05    TPro  5.8<L>  /  Alb  2.9<L>  /  TBili  0.5  /  DBili  x   /  AST  18  /  ALT  14  /  AlkPhos  275<H>  08-05          Urinalysis Basic - ( 04 Aug 2021 19:30 )    Color: Colorless / Appearance: Clear / S.007 / pH: x  Gluc: x / Ketone: Negative  / Bili: Negative / Urobili: Negative   Blood: x / Protein: Negative / Nitrite: Negative   Leuk Esterase: Negative / RBC: 3 /hpf / WBC 0 /HPF   Sq Epi: x / Non Sq Epi: 0 /hpf / Bacteria: Negative        RADIOLOGY & ADDITIONAL TESTS:    Imaging Personally Reviewed:    Consultant(s) Notes Reviewed:      Care Discussed with Consultants/Other Providers:

## 2021-08-06 NOTE — PROGRESS NOTE ADULT - PROBLEM SELECTOR PLAN 1
Diastolic HF noted. On Lasix. Cardiology FU noted. Palliative eval noted. DNR signed. Patient is [facility].

## 2021-08-06 NOTE — PROVIDER CONTACT NOTE (OTHER) - ASSESSMENT
A&O x1-2.  VS: temp 98, , /79, 99% O2 saturation on 2 L N/C, RR 18.  Patient asymptomatic and denies CP, palpitations, discomfort

## 2021-08-06 NOTE — PROVIDER CONTACT NOTE (OTHER) - ACTION/TREATMENT ORDERED:
PA notified and aware.  BMP, Mg, Phos labs ordered STAT.  12-lead EKG ordered.  Will continue to monitor pt and maintain safety

## 2021-08-06 NOTE — PROGRESS NOTE ADULT - SUBJECTIVE AND OBJECTIVE BOX
[facility] [place] [facility]   MD Neo Stein MD, D.O. Ruoru Wong, PA    From 7 AM - 5 PM:  OFFICE: [phone]  Dr. Yuan cell: [phone]  Dr. Gilliam cell: [phone]  Dr. Baez cell: [phone]  MAYA Jones cell: [phone]    From 5 PM - 7 AM: Answering Service: [phone]  Date of service: 08-06-21 @ 15:53    RENAL FOLLOW UP NOTE  --------------------------------------------------------------------------------  HPI:  Pt seen and examined at bedside.       PAST HISTORY  --------------------------------------------------------------------------------  No significant changes to PMH, PSH, FHx, SHx, unless otherwise noted    ALLERGIES & MEDICATIONS  --------------------------------------------------------------------------------  Allergies    Allergy Status Unknown    Intolerances      Standing Inpatient Medications  aspirin  chewable 81 milliGRAM(s) Oral daily  furosemide   Injectable 80 milliGRAM(s) IV Push two times a day  heparin   Injectable 5000 Unit(s) SubCutaneous every 12 hours  metoprolol tartrate 25 milliGRAM(s) Oral two times a day  potassium chloride   Powder 20 milliEquivalent(s) Oral two times a day    PRN Inpatient Medications      REVIEW OF SYSTEMS  --------------------------------------------------------------------------------  General: no fever  CVS: no chest pain  RESP: no sob, no cough  ABD: no abdominal pain  : no dysuria,  MSK: no edema     VITALS/PHYSICAL EXAM  --------------------------------------------------------------------------------  T(C): 36.7 (08-06-21 @ 12:59), Max: 36.7 (08-06-21 @ 12:59)  HR: 75 (08-06-21 @ 12:59) (75 - 94)  BP: 134/65 (08-06-21 @ 12:59) (134/65 - 157/85)  RR: 18 (08-06-21 @ 05:37) (18 - 18)  SpO2: 100% (08-06-21 @ 12:59) (99% - 100%)  Wt(kg): --        08-05-21 @ 07:01  -  08-06-21 @ 07:00  --------------------------------------------------------  IN: 1080 mL / OUT: 1700 mL / NET: -620 mL    08-06-21 @ 07:01  -  08-06-21 @ 15:53  --------------------------------------------------------  IN: 0 mL / OUT: 1900 mL / NET: -1900 mL      Physical Exam:  	Gen: NAD  	HEENT: MMM  	Pulm: CTA B/L  	CV: S1S2  	Abd: Soft, +BS  	Ext: No LE edema B/L                      Neuro: Awake   	Skin: Warm and Dry       LABS/STUDIES  --------------------------------------------------------------------------------              9.0    8.35  >-----------<  105      [08-06-21 @ 07:22]              30.6     137  |  103  |  27  ----------------------------<  134      [08-06-21 @ 07:22]  4.1   |  23  |  1.75        Ca     8.5     [08-06-21 @ 07:22]      Mg     1.8     [08-06-21 @ 07:22]      Phos  1.9     [08-06-21 @ 07:22]    TPro  6.0  /  Alb  2.9  /  TBili  0.4  /  DBili  x   /  AST  25  /  ALT  17  /  AlkPhos  304  [08-06-21 @ 07:22]    CK 28      [08-06-21 @ 07:22]    Creatinine Trend:  SCr 1.75 [08-06 @ 07:22]  SCr 1.91 [08-05 @ 07:37]  SCr 1.93 [08-04 @ 08:07]    Urinalysis - [08-04-21 @ 19:30]      Color Colorless / Appearance Clear / SG 1.007 / pH 6.0      Gluc Negative / Ketone Negative  / Bili Negative / Urobili Negative       Blood Trace / Protein Negative / Leuk Est Negative / Nitrite Negative      RBC 3 / WBC 0 / Hyaline 1 / Gran  / Sq Epi  / Non Sq Epi 0 / Bacteria Negative

## 2021-08-06 NOTE — PROGRESS NOTE ADULT - SUBJECTIVE AND OBJECTIVE BOX
Cardiology    HISTORY OF PRESENT ILLNESS: HPI:  85 yo male with pmh htn, hld, CHF here for worsening confusion and sob. Per pts daughter over the phone, pt recently admitted to the hospital in [place], moved up here for medical attention and in interim developed worsening sob. pt non contributory to history, daughter denies recent fevers, chills, difficulty tolerating po. No recent falls. Chest X ray shows pulm vascular congestion. He also has elevated d dimer to 770. (04 Aug 2021 13:33)    Mr Bryant is a pleasant 86yoM recently brought up from [place] for medical care closer to his family. He has been hospitalized a few times for CHF.  He is oriented only to name and does not know what his medical problems are.  He states he has not walked in some time, he is having shortness of breath, fatigue, difficulty lying flat.  10pt ROS attempted but patient is admittedly unsure about much of his symptoms.    Per daughter (Barbara, [phone])  Moved to [place] x 2 yrs after traveling back and forth for many years.  In May 2021, was living independently, mildly forgetful but short-tempered.  Was still driving and doing errands.  Family has been looking into assisted living in [facility], trying to transition him before serious illness but he refused xfer.  Had back to back falls / stuck on the floor 6/2021 two days apart.  By 6/15, went to rehab.   In and out of [facility] 3x in 6 weeks    8/5- uneventful overnight.  difficulty hearing, but states he feels OK today.  no angina or palpitations.  8/6- family at bedside today.  reports that he is awake, alert, and overall cooperative by day, but sundowns at night.    aspirin  chewable 81 milliGRAM(s) Oral daily  furosemide   Injectable 80 milliGRAM(s) IV Push two times a day  heparin   Injectable 5000 Unit(s) SubCutaneous every 12 hours  metoprolol tartrate 25 milliGRAM(s) Oral two times a day  potassium chloride   Powder 20 milliEquivalent(s) Oral two times a day                         9.0    8.35  )-----------( 105      ( 06 Aug 2021 07:22 )             30.6       08-06    137  |  103  |  27<H>  ----------------------------<  134<H>  4.1   |  23  |  1.75<H>    Ca    8.5      06 Aug 2021 07:22  Phos  1.9     08-06  Mg     1.8     08-06    TPro  6.0  /  Alb  2.9<L>  /  TBili  0.4  /  DBili  x   /  AST  25  /  ALT  17  /  AlkPhos  304<H>  08-06      CARDIAC MARKERS ( 06 Aug 2021 07:22 )  x     / x     / 28 U/L / x     / x        T(C): 36.7 (08-06-21 @ 12:59), Max: 36.7 (08-06-21 @ 12:59)  HR: 75 (08-06-21 @ 12:59) (75 - 94)  BP: 134/65 (08-06-21 @ 12:59) (134/65 - 157/85)  RR: 18 (08-06-21 @ 05:37) (18 - 18)  SpO2: 100% (08-06-21 @ 12:59) (99% - 100%)  Wt(kg): --    I&O's Summary    05 Aug 2021 07:01  -  06 Aug 2021 07:00  --------------------------------------------------------  IN: 1080 mL / OUT: 1700 mL / NET: -620 mL    06 Aug 2021 07:01  -  06 Aug 2021 14:13  --------------------------------------------------------  IN: 0 mL / OUT: 1900 mL / NET: -1900 mL    Appearance: calm and pleasant elderly white male, oriented to name only  HEENT:   Normal oral mucosa, PERRL, EOMI	  Lymphatic: No lymphadenopathy , no edema  Cardiovascular: irregular S1 S2, + JVD, No murmurs , Peripheral pulses palpable 1+ bilaterally  Respiratory: poor air entry BL, rales.  Gastrointestinal:  Soft, Non-tender, + BS	  Skin: No rashes, No ecchymoses, No cyanosis, warm to touch, diffuse bruising  Musculoskeletal: limited ROM due to edema  Psychiatry:  Mood is "im fine" & affect is restricted    TELEMETRY: AFib, RBBB, HR controlled.	    ECG:  AF, RBBB  Echo: Hyperdynamic (EF >75%), LVH, calcified aortic valve without AS, mild MR, dilated LA, elevated RA pressure.    ASSESSMENT/PLAN: 	86y Male admitted w/ anasarca, urgently dropped off by family who drove him back from [place].  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 Stroke 1 yr ago, recent falls and rapid deterioration.    Start Aspirin 81mg daily for Watchman, stroke secondary prevention.  Lasix 80mg IV BID, diuresing net-negative ~2L/day.  Replace add'l KCl and Magnesium (target K 4-4.5, Mg 2), as he will need extensive diuresis.  Low dose beta blocker added, HR 70s-90s.    Will follow, may increase BB over the weekend depending on HR results.    Daughter, Barbara     Dennis Hickman M.D.  Cardiac Electrophysiology    office [phone]  pager [phone]

## 2021-08-07 NOTE — PROGRESS NOTE ADULT - SUBJECTIVE AND OBJECTIVE BOX
[facility] [place] [facility]   MD ALLYSSA AUGUSTE MD RUORU WONG, PA    TEL:  OFFICE: [phone]  DR NANCE CELL: [phone]  JAMILA DOUGLAS CELL: [phone]  DR. HERNANDEZ CELL: [phone]      FROM 5 PM - 7 AM PLEASE CALL ANSWERING SERVICE: [phone]    RENAL FOLLOW UP NOTE--Date of Service 08-07-21 @ 09:26  --------------------------------------------------------------------------------  HPI:      Pt seen and examined at bedside.   sitting up in chair having breakfast    PAST HISTORY  --------------------------------------------------------------------------------  No significant changes to PMH, PSH, FHx, SHx, unless otherwise noted    ALLERGIES & MEDICATIONS  --------------------------------------------------------------------------------  Allergies    Allergy Status Unknown    Intolerances      Standing Inpatient Medications  aspirin  chewable 81 milliGRAM(s) Oral daily  furosemide   Injectable 80 milliGRAM(s) IV Push two times a day  heparin   Injectable 5000 Unit(s) SubCutaneous every 12 hours  metoprolol tartrate 25 milliGRAM(s) Oral two times a day  potassium chloride   Powder 20 milliEquivalent(s) Oral two times a day    PRN Inpatient Medications      REVIEW OF SYSTEMS  --------------------------------------------------------------------------------  General: no fever    MSK: no edema     VITALS/PHYSICAL EXAM  --------------------------------------------------------------------------------  T(C): 36.9 (08-07-21 @ 04:56), Max: 36.9 (08-07-21 @ 04:56)  HR: 93 (08-07-21 @ 08:40) (75 - 100)  BP: 139/76 (08-07-21 @ 08:40) (128/75 - 145/80)  RR: 18 (08-07-21 @ 08:40) (18 - 18)  SpO2: 99% (08-07-21 @ 08:40) (99% - 100%)  Wt(kg): --        08-06-21 @ 07:01  -  08-07-21 @ 07:00  --------------------------------------------------------  IN: 470 mL / OUT: 3800 mL / NET: -3330 mL      Physical Exam:  	Gen: NAD  	HEENT: MMM  	Pulm: CTA B/L  	CV: S1S2  	Abd: Soft, +BS  	Ext: No LE edema B/L                      Neuro: Awake   	Skin: Warm and Dry   	Vascular access: NO HD catheter            no  jovanni  LABS/STUDIES  --------------------------------------------------------------------------------              8.8    7.97  >-----------<  93       [08-07-21 @ 06:53]              29.3     137  |  101  |  26  ----------------------------<  151      [08-07-21 @ 07:08]  4.7   |  26  |  1.58        Ca     8.6     [08-07-21 @ 07:08]      Mg     1.8     [08-07-21 @ 07:08]      Phos  3.2     [08-07-21 @ 07:08]    TPro  6.0  /  Alb  2.9  /  TBili  0.4  /  DBili  x   /  AST  25  /  ALT  17  /  AlkPhos  304  [08-06-21 @ 07:22]        CK 28      [08-06-21 @ 07:22]    Creatinine Trend:  SCr 1.58 [08-07 @ 07:08]  SCr 1.69 [08-06 @ 22:10]  SCr 1.75 [08-06 @ 07:22]  SCr 1.91 [08-05 @ 07:37]  SCr 1.93 [08-04 @ 08:07]    Urinalysis - [08-04-21 @ 19:30]      Color Colorless / Appearance Clear / SG 1.007 / pH 6.0      Gluc Negative / Ketone Negative  / Bili Negative / Urobili Negative       Blood Trace / Protein Negative / Leuk Est Negative / Nitrite Negative      RBC 3 / WBC 0 / Hyaline 1 / Gran  / Sq Epi  / Non Sq Epi 0 / Bacteria Negative

## 2021-08-07 NOTE — PHYSICAL THERAPY INITIAL EVALUATION ADULT - GAIT DEVIATIONS NOTED, PT EVAL
decreased victor hugo/decreased step length/decreased stride length/decreased weight-shifting ability

## 2021-08-07 NOTE — PROGRESS NOTE ADULT - SUBJECTIVE AND OBJECTIVE BOX
Patient is a 86y old  Male who presents with a chief complaint of Dyspnea (05 Aug 2021 13:06)    21  HPI:  Denies dyspnea, feels better. [place].     PAST MEDICAL & SURGICAL HISTORY:      Review of Systems:   CONSTITUTIONAL: No fever, weight loss, or fatigue  EYES: No eye pain, visual disturbances, or discharge  ENMT:  No difficulty hearing, tinnitus, vertigo; No sinus or throat pain  NECK: No pain or stiffness  BREASTS: No pain, masses, or nipple discharge  RESPIRATORY: No cough, wheezing, chills or hemoptysis; No shortness of breath  CARDIOVASCULAR: No chest pain, palpitations, dizziness, or leg swelling  GASTROINTESTINAL: No abdominal or epigastric pain. No nausea, vomiting, or hematemesis; No diarrhea or constipation. No melena or hematochezia.  GENITOURINARY: No dysuria, frequency, hematuria, or incontinence  NEUROLOGICAL: No headaches, memory loss, loss of strength, numbness, or tremors  SKIN: No itching, burning, rashes, or lesions   LYMPH NODES: No enlarged glands  ENDOCRINE: No heat or cold intolerance; No hair loss  MUSCULOSKELETAL: No joint pain or swelling; No muscle, back, or extremity pain  PSYCHIATRIC: No depression, anxiety, mood swings, or difficulty sleeping  HEME/LYMPH: No easy bruising, or bleeding gums  ALLERY AND IMMUNOLOGIC: No hives or eczema    Allergies    Allergy Status Unknown    Intolerances        Social History:     FAMILY HISTORY:      MEDICATIONS  (STANDING):  aspirin  chewable 81 milliGRAM(s) Oral daily  furosemide   Injectable 80 milliGRAM(s) IV Push two times a day  heparin   Injectable 5000 Unit(s) SubCutaneous every 12 hours  metoprolol tartrate 25 milliGRAM(s) Oral two times a day  potassium chloride   Powder 20 milliEquivalent(s) Oral two times a day    MEDICATIONS  (PRN):        CAPILLARY BLOOD GLUCOSE        I&O's Summary    04 Aug 2021 07:  -  05 Aug 2021 07:00  --------------------------------------------------------  IN: 240 mL / OUT: 2800 mL / NET: -2560 mL    05 Aug 2021 07:01  -  05 Aug 2021 20:42  --------------------------------------------------------  IN: 960 mL / OUT: 900 mL / NET: 60 mL        PHYSICAL EXAM:  Vital Signs Last 24 Hrs  T(C): 36.4 (05 Aug 2021 17:42), Max: 36.7 (05 Aug 2021 04:10)  T(F): 97.5 (05 Aug 2021 17:42), Max: 98.1 (05 Aug 2021 04:10)  HR: 93 (05 Aug 2021 17:42) (85 - 105)  BP: 134/72 (05 Aug 2021 17:42) (116/73 - 136/64)  BP(mean): --  RR: 18 (05 Aug 2021 17:42) (18 - 18)  SpO2: 100% (05 Aug 2021 17:42) (99% - 100%)    GENERAL: NAD, well-developed  HEAD:  Atraumatic, Normocephalic  EYES: EOMI, PERRLA, conjunctiva and sclera clear  NECK: Supple, No JVD  CHEST/LUNG: Clear to auscultation bilaterally; No wheeze  HEART: Regular rate and rhythm; No murmurs, rubs, or gallops  ABDOMEN: Soft, Nontender, Nondistended; Bowel sounds present  EXTREMITIES:  2+ Peripheral Pulses, No clubbing, cyanosis, or edema  PSYCH: AAOx3  NEUROLOGY: non-focal  SKIN: No rashes or lesions    LABS:                        8.6    7.09  )-----------( 95       ( 05 Aug 2021 07:36 )             29.5     08-05    140  |  106  |  29<H>  ----------------------------<  123<H>  3.8   |  22  |  1.91<H>    Ca    8.1<L>      05 Aug 2021 07:37  Mg     1.5     08-05    TPro  5.8<L>  /  Alb  2.9<L>  /  TBili  0.5  /  DBili  x   /  AST  18  /  ALT  14  /  AlkPhos  275<H>  08-05          Urinalysis Basic - ( 04 Aug 2021 19:30 )    Color: Colorless / Appearance: Clear / S.007 / pH: x  Gluc: x / Ketone: Negative  / Bili: Negative / Urobili: Negative   Blood: x / Protein: Negative / Nitrite: Negative   Leuk Esterase: Negative / RBC: 3 /hpf / WBC 0 /HPF   Sq Epi: x / Non Sq Epi: 0 /hpf / Bacteria: Negative        RADIOLOGY & ADDITIONAL TESTS:    Imaging Personally Reviewed:    Consultant(s) Notes Reviewed:      Care Discussed with Consultants/Other Providers:

## 2021-08-07 NOTE — PHYSICAL THERAPY INITIAL EVALUATION ADULT - PERTINENT HX OF CURRENT PROBLEM, REHAB EVAL
85 yo male with pmh htn, hld, CHF here for worsening confusion and sob. Per pts daughter over the phone, pt recently admitted to the hospital in [place], moved up here for medical attention and in interim developed worsening sob. pt non contributory to history, daughter denies recent fevers, chills, difficulty tolerating po. No recent falls. Chest X ray shows pulm vascular congestion. He also has elevated d dimer to 770. (-)CTH

## 2021-08-07 NOTE — PHYSICAL THERAPY INITIAL EVALUATION ADULT - ADDITIONAL COMMENTS
As per chart review: pt was initially living in [place] and was I with all ADLs. pt recently moved to [place] to live with family and demo progressive worsening functional status. As per chart, pt with multiple recent falls at home.

## 2021-08-07 NOTE — PROGRESS NOTE ADULT - SUBJECTIVE AND OBJECTIVE BOX
Cardiology    HISTORY OF PRESENT ILLNESS: HPI:  87 yo male with pmh htn, hld, CHF here for worsening confusion and sob. Per pts daughter over the phone, pt recently admitted to the hospital in [place], moved up here for medical attention and in interim developed worsening sob. pt non contributory to history, daughter denies recent fevers, chills, difficulty tolerating po. No recent falls. Chest X ray shows pulm vascular congestion. He also has elevated d dimer to 770. (04 Aug 2021 13:33)    Mr Bryant is a pleasant 86yoM recently brought up from [place] for medical care closer to his family. He has been hospitalized a few times for CHF.  He is oriented only to name and does not know what his medical problems are.  He states he has not walked in some time, he is having shortness of breath, fatigue, difficulty lying flat.  10pt ROS attempted but patient is admittedly unsure about much of his symptoms.    Per daughter (Barbara, [phone])  Moved to [place] x 2 yrs after traveling back and forth for many years.  In May 2021, was living independently, mildly forgetful but short-tempered.  Was still driving and doing errands.  Family has been looking into assisted living in [facility], trying to transition him before serious illness but he refused xfer.  Had back to back falls / stuck on the floor 6/2021 two days apart.  By 6/15, went to rehab.   In and out of [facility] 3x in 6 weeks    8/5- uneventful overnight.  difficulty hearing, but states he feels OK today.  no angina or palpitations.  8/6- family at bedside today.  reports that he is awake, alert, and overall cooperative by day, but sundowns at night.  8/7 - WCT on tele overnight, ECG done with no acute changes , stat labs reviewed , mg& phos replaced          aspirin  chewable 81 milliGRAM(s) Oral daily  furosemide   Injectable 80 milliGRAM(s) IV Push two times a day  heparin   Injectable 5000 Unit(s) SubCutaneous every 12 hours  metoprolol tartrate 25 milliGRAM(s) Oral two times a day  potassium chloride   Powder 20 milliEquivalent(s) Oral two times a day                            9.0    8.35  )-----------( 105      ( 06 Aug 2021 07:22 )             30.6       Hemoglobin: 9.0 g/dL (08-06 @ 07:22)  Hemoglobin: 8.6 g/dL (08-05 @ 07:36)  Hemoglobin: 9.2 g/dL (08-04 @ 08:07)      08-06    144  |  105  |  28<H>  ----------------------------<  185<H>  4.4   |  24  |  1.69<H>    Ca    8.4      06 Aug 2021 22:10  Phos  2.2     08-06  Mg     1.6     08-06    TPro  6.0  /  Alb  2.9<L>  /  TBili  0.4  /  DBili  x   /  AST  25  /  ALT  17  /  AlkPhos  304<H>  08-06    Creatinine Trend: 1.69<--, 1.75<--, 1.91<--, 1.93<--    COAGS:     CARDIAC MARKERS ( 06 Aug 2021 07:22 )  x     / x     / 28 U/L / x     / x            T(C): 36.9 (08-07-21 @ 04:56), Max: 36.9 (08-07-21 @ 04:56)  HR: 96 (08-07-21 @ 04:56) (75 - 100)  BP: 145/80 (08-07-21 @ 04:56) (128/75 - 145/80)  RR: 18 (08-07-21 @ 04:56) (18 - 18)  SpO2: 100% (08-07-21 @ 04:56) (99% - 100%)  Wt(kg): --    I&O's Summary    05 Aug 2021 07:01  -  06 Aug 2021 07:00  --------------------------------------------------------  IN: 1080 mL / OUT: 1700 mL / NET: -620 mL    06 Aug 2021 07:01  -  07 Aug 2021 06:54  --------------------------------------------------------  IN: 470 mL / OUT: 3800 mL / NET: -3330 mL      Appearance: calm and pleasant elderly white male, oriented to name only  HEENT:   Normal oral mucosa, PERRL, EOMI	  Lymphatic: No lymphadenopathy , no edema  Cardiovascular: irregular S1 S2, + JVD, No murmurs , Peripheral pulses palpable 1+ bilaterally  Respiratory: poor air entry BL, rales.  Gastrointestinal:  Soft, Non-tender, + BS	  Skin: No rashes, No ecchymoses, No cyanosis, warm to touch, diffuse bruising  Musculoskeletal: limited ROM due to edema  Psychiatry:  Mood is "im fine" & affect is restricted    TELEMETRY: AFib, RBBB, HR controlled.	    ECG:  AF, RBBB  Echo: Hyperdynamic (EF >75%), LVH, calcified aortic valve without AS, mild MR, dilated LA, elevated RA pressure.    ASSESSMENT/PLAN: 	86y Male admitted w/ anasarca, urgently dropped off by family who drove him back from [place].  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 Stroke 1 yr ago, recent falls and rapid deterioration.    cont Aspirin 81mg daily for Watchman, stroke secondary prevention.  Keep net neg with IV lasix    keep lytes checked , replace K>4, mg >2.2    Low dose beta blocker added, HR 70s-90s.    tolerating low dose BB,  may increase BB     Cardiology    HISTORY OF PRESENT ILLNESS: HPI:  85 yo male with pmh htn, hld, CHF here for worsening confusion and sob. Per pts daughter over the phone, pt recently admitted to the hospital in [place], moved up here for medical attention and in interim developed worsening sob. pt non contributory to history, daughter denies recent fevers, chills, difficulty tolerating po. No recent falls. Chest X ray shows pulm vascular congestion. He also has elevated d dimer to 770. (04 Aug 2021 13:33)    Mr Bryant is a pleasant 86yoM recently brought up from [place] for medical care closer to his family. He has been hospitalized a few times for CHF.  He is oriented only to name and does not know what his medical problems are.  He states he has not walked in some time, he is having shortness of breath, fatigue, difficulty lying flat.  10pt ROS attempted but patient is admittedly unsure about much of his symptoms.    Per daughter (Barbara, [phone])  Moved to [place] x 2 yrs after traveling back and forth for many years.  In May 2021, was living independently, mildly forgetful but short-tempered.  Was still driving and doing errands.  Family has been looking into assisted living in [facility], trying to transition him before serious illness but he refused xfer.  Had back to back falls / stuck on the floor 6/2021 two days apart.  By 6/15, went to rehab.   In and out of [facility] 3x in 6 weeks    8/5- uneventful overnight.  difficulty hearing, but states he feels OK today.  no angina or palpitations.  8/6- family at bedside today.  reports that he is awake, alert, and overall cooperative by day, but sundowns at night.  8/7 - WCT on tele overnight, ECG done with no acute changes , stat labs reviewed , mg& phos replaced          aspirin  chewable 81 milliGRAM(s) Oral daily  furosemide   Injectable 80 milliGRAM(s) IV Push two times a day  heparin   Injectable 5000 Unit(s) SubCutaneous every 12 hours  metoprolol tartrate 25 milliGRAM(s) Oral two times a day  potassium chloride   Powder 20 milliEquivalent(s) Oral two times a day                            9.0    8.35  )-----------( 105      ( 06 Aug 2021 07:22 )             30.6       Hemoglobin: 9.0 g/dL (08-06 @ 07:22)  Hemoglobin: 8.6 g/dL (08-05 @ 07:36)  Hemoglobin: 9.2 g/dL (08-04 @ 08:07)      08-06    144  |  105  |  28<H>  ----------------------------<  185<H>  4.4   |  24  |  1.69<H>    Ca    8.4      06 Aug 2021 22:10  Phos  2.2     08-06  Mg     1.6     08-06    TPro  6.0  /  Alb  2.9<L>  /  TBili  0.4  /  DBili  x   /  AST  25  /  ALT  17  /  AlkPhos  304<H>  08-06    Creatinine Trend: 1.69<--, 1.75<--, 1.91<--, 1.93<--    COAGS:     CARDIAC MARKERS ( 06 Aug 2021 07:22 )  x     / x     / 28 U/L / x     / x            T(C): 36.9 (08-07-21 @ 04:56), Max: 36.9 (08-07-21 @ 04:56)  HR: 96 (08-07-21 @ 04:56) (75 - 100)  BP: 145/80 (08-07-21 @ 04:56) (128/75 - 145/80)  RR: 18 (08-07-21 @ 04:56) (18 - 18)  SpO2: 100% (08-07-21 @ 04:56) (99% - 100%)  Wt(kg): --    I&O's Summary    05 Aug 2021 07:01  -  06 Aug 2021 07:00  --------------------------------------------------------  IN: 1080 mL / OUT: 1700 mL / NET: -620 mL    06 Aug 2021 07:01  -  07 Aug 2021 06:54  --------------------------------------------------------  IN: 470 mL / OUT: 3800 mL / NET: -3330 mL      Appearance: calm and pleasant elderly white male, oriented to name only  HEENT:   Normal oral mucosa, PERRL, EOMI	  Lymphatic: No lymphadenopathy , no edema  Cardiovascular: irregular S1 S2, + JVD, No murmurs , Peripheral pulses palpable 1+ bilaterally  Respiratory: poor air entry BL, rales.  Gastrointestinal:  Soft, Non-tender, + BS	  Skin: No rashes, No ecchymoses, No cyanosis, warm to touch, diffuse bruising  Musculoskeletal: limited ROM due to edema  Psychiatry:  Mood is "im fine" & affect is restricted    TELEMETRY: AFib, RBBB, HR controlled.	    ECG:  AF, RBBB  Echo: Hyperdynamic (EF >75%), LVH, calcified aortic valve without AS, mild MR, dilated LA, elevated RA pressure.    ASSESSMENT/PLAN: 	86y Male admitted w/ anasarca, urgently dropped off by family who drove him back from [place].  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 Stroke 1 yr ago, recent falls and rapid deterioration.    cont Aspirin 81mg daily for Watchman, stroke secondary prevention.  Keep net neg with IV lasix    keep lytes checked , replace K>4, mg >2.2    Low dose beta blocker added, HR 70s-90s.    tolerating low dose BB,  may increase BB to 50mg BID.

## 2021-08-08 NOTE — PROGRESS NOTE ADULT - SUBJECTIVE AND OBJECTIVE BOX
[facility]   MD ALLYSSA AUGUSTE MD RUORU WONG, PA    TEL:  OFFICE: [phone]  DR NANCE CELL: [phone]  JAMILA DOUGLAS CELL: [phone]  DR. HERNANDEZ CELL: [phone]      FROM 5 PM - 7 AM PLEASE CALL ANSWERING SERVICE: [phone]    RENAL FOLLOW UP NOTE--Date of Service 08-08-21 @ 08:40  --------------------------------------------------------------------------------  HPI:      Pt seen and examined at bedside.     PAST HISTORY  --------------------------------------------------------------------------------  No significant changes to PMH, PSH, FHx, SHx, unless otherwise noted    ALLERGIES & MEDICATIONS  --------------------------------------------------------------------------------  Allergies    Allergy Status Unknown    Intolerances      Standing Inpatient Medications  aspirin  chewable 81 milliGRAM(s) Oral daily  furosemide   Injectable 80 milliGRAM(s) IV Push two times a day  heparin   Injectable 5000 Unit(s) SubCutaneous every 12 hours  metoprolol tartrate 25 milliGRAM(s) Oral two times a day  potassium chloride   Powder 20 milliEquivalent(s) Oral two times a day    PRN Inpatient Medications      REVIEW OF SYSTEMS  --------------------------------------------------------------------------------  General: no fever    MSK: no edema     VITALS/PHYSICAL EXAM  --------------------------------------------------------------------------------  T(C): 36.7 (08-08-21 @ 05:17), Max: 37 (08-07-21 @ 12:24)  HR: 87 (08-08-21 @ 05:17) (80 - 96)  BP: 130/64 (08-08-21 @ 05:17) (126/70 - 144/84)  RR: 18 (08-08-21 @ 05:17) (18 - 18)  SpO2: 97% (08-08-21 @ 05:17) (87% - 99%)  Wt(kg): --        08-07-21 @ 07:01  -  08-08-21 @ 07:00  --------------------------------------------------------  IN: 780 mL / OUT: 2450 mL / NET: -1670 mL      Physical Exam:  	Gen: NAD  	HEENT: MMM  	Pulm: CTA B/L  	CV: S1S2  	Abd: Soft, +BS  	Ext: No LE edema B/L                      Neuro: Awake   	Skin: Warm and Dry   	Vascular access: NO HD catheter           EMILY baig  LABS/STUDIES  --------------------------------------------------------------------------------              8.8    7.97  >-----------<  93       [08-07-21 @ 06:53]              29.3     137  |  101  |  26  ----------------------------<  151      [08-07-21 @ 07:08]  4.7   |  26  |  1.58        Ca     8.6     [08-07-21 @ 07:08]      Mg     1.8     [08-07-21 @ 07:08]      Phos  3.2     [08-07-21 @ 07:08]            Creatinine Trend:  SCr 1.58 [08-07 @ 07:08]  SCr 1.69 [08-06 @ 22:10]  SCr 1.75 [08-06 @ 07:22]  SCr 1.91 [08-05 @ 07:37]  SCr 1.93 [08-04 @ 08:07]    Urinalysis - [08-04-21 @ 19:30]      Color Colorless / Appearance Clear / SG 1.007 / pH 6.0      Gluc Negative / Ketone Negative  / Bili Negative / Urobili Negative       Blood Trace / Protein Negative / Leuk Est Negative / Nitrite Negative      RBC 3 / WBC 0 / Hyaline 1 / Gran  / Sq Epi  / Non Sq Epi 0 / Bacteria Negative

## 2021-08-08 NOTE — PROGRESS NOTE ADULT - SUBJECTIVE AND OBJECTIVE BOX
Cardiology    HISTORY OF PRESENT ILLNESS: HPI:  87 yo male with pmh htn, hld, CHF here for worsening confusion and sob. Per pts daughter over the phone, pt recently admitted to the hospital in [place], moved up here for medical attention and in interim developed worsening sob. pt non contributory to history, daughter denies recent fevers, chills, difficulty tolerating po. No recent falls. Chest X ray shows pulm vascular congestion. He also has elevated d dimer to 770. (04 Aug 2021 13:33)    Mr Bryant is a pleasant 86yoM recently brought up from [place] for medical care closer to his family. He has been hospitalized a few times for CHF.  He is oriented only to name and does not know what his medical problems are.  He states he has not walked in some time, he is having shortness of breath, fatigue, difficulty lying flat.  10pt ROS attempted but patient is admittedly unsure about much of his symptoms.    Per daughter (Barbara, [phone])  Moved to [place] x 2 yrs after traveling back and forth for many years.  In May 2021, was living independently, mildly forgetful but short-tempered.  Was still driving and doing errands.  Family has been looking into assisted living in [facility], trying to transition him before serious illness but he refused xfer.  Had back to back falls / stuck on the floor 6/2021 two days apart.  By 6/15, went to rehab.   In and out of [facility] 3x in 6 weeks    8/5- uneventful overnight.  difficulty hearing, but states he feels OK today.  no angina or palpitations.  8/6- family at bedside today.  reports that he is awake, alert, and overall cooperative by day, but sundowns at night.  8/7 - WCT on tele overnight, ECG done with no acute changes , stat labs reviewed , mg& phos replaced   8/8 no further arrythmia on tele. no chest pain , sob             aspirin  chewable 81 milliGRAM(s) Oral daily  furosemide   Injectable 80 milliGRAM(s) IV Push two times a day  heparin   Injectable 5000 Unit(s) SubCutaneous every 12 hours  metoprolol tartrate 25 milliGRAM(s) Oral two times a day  potassium chloride   Powder 20 milliEquivalent(s) Oral two times a day                            8.8    7.97  )-----------( 93       ( 07 Aug 2021 06:53 )             29.3       Hemoglobin: 8.8 g/dL (08-07 @ 06:53)  Hemoglobin: 9.0 g/dL (08-06 @ 07:22)  Hemoglobin: 8.6 g/dL (08-05 @ 07:36)  Hemoglobin: 9.2 g/dL (08-04 @ 08:07)      08-07    137  |  101  |  26<H>  ----------------------------<  151<H>  4.7   |  26  |  1.58<H>    Ca    8.6      07 Aug 2021 07:08  Phos  3.2     08-07  Mg     1.8     08-07    TPro  6.0  /  Alb  2.9<L>  /  TBili  0.4  /  DBili  x   /  AST  25  /  ALT  17  /  AlkPhos  304<H>  08-06    Creatinine Trend: 1.58<--, 1.69<--, 1.75<--, 1.91<--, 1.93<--    COAGS:     CARDIAC MARKERS ( 06 Aug 2021 07:22 )  x     / x     / 28 U/L / x     / x            T(C): 36.7 (08-08-21 @ 05:17), Max: 37 (08-07-21 @ 12:24)  HR: 87 (08-08-21 @ 05:17) (80 - 96)  BP: 130/64 (08-08-21 @ 05:17) (126/70 - 144/84)  RR: 18 (08-08-21 @ 05:17) (18 - 18)  SpO2: 97% (08-08-21 @ 05:17) (87% - 99%)  Wt(kg): --    I&O's Summary    06 Aug 2021 07:01  -  07 Aug 2021 07:00  --------------------------------------------------------  IN: 470 mL / OUT: 3800 mL / NET: -3330 mL    07 Aug 2021 07:01  -  08 Aug 2021 06:59  --------------------------------------------------------  IN: 780 mL / OUT: 2450 mL / NET: -1670 mL      Appearance: calm and pleasant elderly white male, oriented to name only  HEENT:   Normal oral mucosa, PERRL, EOMI	  Lymphatic: No lymphadenopathy , no edema  Cardiovascular: irregular S1 S2, + JVD, No murmurs , Peripheral pulses palpable 1+ bilaterally  Respiratory: poor air entry BL, rales.  Gastrointestinal:  Soft, Non-tender, + BS	  Skin: No rashes, No ecchymoses, No cyanosis, warm to touch, diffuse bruising  Musculoskeletal: limited ROM due to edema  Psychiatry:  Mood is "im fine" & affect is restricted    TELEMETRY: AFib, RBBB, HR controlled.	    ECG:  AF, RBBB  Echo: Hyperdynamic (EF >75%), LVH, calcified aortic valve without AS, mild MR, dilated LA, elevated RA pressure.    ASSESSMENT/PLAN: 	86y Male admitted w/ anasarca, urgently dropped off by family who drove him back from [place]. 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 Stroke 1 yr ago, recent falls and rapid deterioration.    cont Aspirin 81mg daily for Watchman, stroke secondary prevention.  Keep net neg with IV lasix  , should change to PO in 24 hr   keep lytes checked , replace K>4, mg >2.2    Low dose beta blocker added, HR 70s-90s. May increase BB to 50mg BID.    Cardiology    HISTORY OF PRESENT ILLNESS: HPI:  87 yo male with pmh htn, hld, CHF here for worsening confusion and sob. Per pts daughter over the phone, pt recently admitted to the hospital in [place], moved up here for medical attention and in interim developed worsening sob. pt non contributory to history, daughter denies recent fevers, chills, difficulty tolerating po. No recent falls. Chest X ray shows pulm vascular congestion. He also has elevated d dimer to 770. (04 Aug 2021 13:33)    Mr Bryant is a pleasant 86yoM recently brought up from [place] for medical care closer to his family. He has been hospitalized a few times for CHF.  He is oriented only to name and does not know what his medical problems are.  He states he has not walked in some time, he is having shortness of breath, fatigue, difficulty lying flat.  10pt ROS attempted but patient is admittedly unsure about much of his symptoms.    Per daughter (Barbara, [phone])  Moved to [place] x 2 yrs after traveling back and forth for many years.  In May 2021, was living independently, mildly forgetful but short-tempered.  Was still driving and doing errands.  Family has been looking into assisted living in [facility], trying to transition him before serious illness but he refused xfer.  Had back to back falls / stuck on the floor 6/2021 two days apart.  By 6/15, went to rehab.   In and out of [facility] 3x in 6 weeks    8/5- uneventful overnight.  difficulty hearing, but states he feels OK today.  no angina or palpitations.  8/6- family at bedside today.  reports that he is awake, alert, and overall cooperative by day, but sundowns at night.  8/7 - WCT on tele overnight, ECG done with no acute changes , stat labs reviewed , mg& phos replaced   8/8 no further arrythmia on tele. no chest pain , sob             aspirin  chewable 81 milliGRAM(s) Oral daily  furosemide   Injectable 80 milliGRAM(s) IV Push two times a day  heparin   Injectable 5000 Unit(s) SubCutaneous every 12 hours  metoprolol tartrate 25 milliGRAM(s) Oral two times a day  potassium chloride   Powder 20 milliEquivalent(s) Oral two times a day                            8.8    7.97  )-----------( 93       ( 07 Aug 2021 06:53 )             29.3       Hemoglobin: 8.8 g/dL (08-07 @ 06:53)  Hemoglobin: 9.0 g/dL (08-06 @ 07:22)  Hemoglobin: 8.6 g/dL (08-05 @ 07:36)  Hemoglobin: 9.2 g/dL (08-04 @ 08:07)      08-07    137  |  101  |  26<H>  ----------------------------<  151<H>  4.7   |  26  |  1.58<H>    Ca    8.6      07 Aug 2021 07:08  Phos  3.2     08-07  Mg     1.8     08-07    TPro  6.0  /  Alb  2.9<L>  /  TBili  0.4  /  DBili  x   /  AST  25  /  ALT  17  /  AlkPhos  304<H>  08-06    Creatinine Trend: 1.58<--, 1.69<--, 1.75<--, 1.91<--, 1.93<--    COAGS:     CARDIAC MARKERS ( 06 Aug 2021 07:22 )  x     / x     / 28 U/L / x     / x            T(C): 36.7 (08-08-21 @ 05:17), Max: 37 (08-07-21 @ 12:24)  HR: 87 (08-08-21 @ 05:17) (80 - 96)  BP: 130/64 (08-08-21 @ 05:17) (126/70 - 144/84)  RR: 18 (08-08-21 @ 05:17) (18 - 18)  SpO2: 97% (08-08-21 @ 05:17) (87% - 99%)  Wt(kg): --    I&O's Summary    06 Aug 2021 07:01  -  07 Aug 2021 07:00  --------------------------------------------------------  IN: 470 mL / OUT: 3800 mL / NET: -3330 mL    07 Aug 2021 07:01  -  08 Aug 2021 06:59  --------------------------------------------------------  IN: 780 mL / OUT: 2450 mL / NET: -1670 mL      Appearance: calm and pleasant elderly white male, oriented to name only  HEENT:   Normal oral mucosa, PERRL, EOMI	  Lymphatic: No lymphadenopathy , no edema  Cardiovascular: irregular S1 S2, + JVD, No murmurs , Peripheral pulses palpable 1+ bilaterally  Respiratory: poor air entry BL, rales.  Gastrointestinal:  Soft, Non-tender, + BS	  Skin: No rashes, No ecchymoses, No cyanosis, warm to touch, diffuse bruising  Musculoskeletal: limited ROM due to edema  Psychiatry:  Mood is "im fine" & affect is restricted    TELEMETRY: AFib, RBBB, HR controlled.	    ECG:  AF, RBBB  Echo: Hyperdynamic (EF >75%), LVH, calcified aortic valve without AS, mild MR, dilated LA, elevated RA pressure.    ASSESSMENT/PLAN: 	86y Male admitted w/ anasarca, urgently dropped off by family who drove him back from [place]. 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 Stroke 1 yr ago, recent falls and rapid deterioration.    cont Aspirin 81mg daily for Watchman, stroke secondary prevention.  Keep net neg with IV lasix  , should change to PO on Monday (80mg PO BID, which may decrease as his kidney function improves)  Cardiorenal syndrome was present on admission, improving w. lasix.  As needed, replace K>4, mg >2.2    Low dose beta blocker added, HR 70s-90s.  Increase BB to 50mg BID.

## 2021-08-08 NOTE — PROGRESS NOTE ADULT - ATTENDING COMMENTS
consider BB dose increase to 50mg BID, better rate control of AFib in the setting of diastolic CHF.  diuresing well. continue current strategy.  check daily lytes, replace K and Mag as needed.
seen and agree w/ PA, note above edited to reflect ongoing changes in his clinical status, all for the better.  improving on IV lasix.  change to PO tomorrow.  increase beta blockers.

## 2021-08-09 NOTE — PROGRESS NOTE ADULT - SUBJECTIVE AND OBJECTIVE BOX
Patient is a 86y old  Male who presents with a chief complaint of Dyspnea (09 Aug 2021 21:32)       INTERVAL HPI/OVERNIGHT EVENTS:  Patient seen and evaluated at bedside.  Pt is resting comfortable in NAD.     Allergies    Allergy Status Unknown    Intolerances        Vital Signs Last 24 Hrs  T(C): 36.6 (09 Aug 2021 21:19), Max: 36.6 (09 Aug 2021 05:39)  T(F): 97.9 (09 Aug 2021 21:19), Max: 97.9 (09 Aug 2021 21:19)  HR: 99 (09 Aug 2021 21:19) (84 - 99)  BP: 150/79 (09 Aug 2021 21:19) (133/73 - 150/79)  BP(mean): --  RR: 18 (09 Aug 2021 21:19) (18 - 18)  SpO2: 96% (09 Aug 2021 21:19) (96% - 97%)    LABS:                        8.5    6.49  )-----------( 95       ( 09 Aug 2021 07:01 )             28.3     08-09    140  |  104  |  25<H>  ----------------------------<  159<H>  3.5   |  23  |  1.47<H>    Ca    7.4<L>      09 Aug 2021 07:01          CAPILLARY BLOOD GLUCOSE          Lower Extremity Physical Exam:  Vasular: DP/PT _1/4, B/L, CFT <_3 seconds B/L, Temperature gradient wnl, B/L.   Neuro: Epicritic sensation _diminished to the level of _toes, B/L.  Skin:  Wound #1:   Location: left heel  Size: 1cm diameter  Depth: .2cm  Wound bed: fibrogranular  Drainage:  minimal serous  Odor: none  Periwound: no clinical signs of infection  Etiology: present on admission     Wound #2:   Location: right dorsal midfoot  Size: .7cm   Depth: .1cm   Wound bed: granular  Drainage: minimal serous  Odor: none  Periwound: no clinical signs of infection  Etiology: present on admission    right second toe abrasion pipj with superficial eschar--stable/dry

## 2021-08-09 NOTE — PROGRESS NOTE ADULT - SUBJECTIVE AND OBJECTIVE BOX
[facility]   MD Neo Stein MD, D.O. Ruoru Wong, PA    From 7 AM - 5 PM:  OFFICE: [phone]  Dr. Yuna cell: [phone]  Dr. Gilliam cell: [phone]  Dr. Baez cell: [phone]  MAYA Jones cell: [phone]    From 5 PM - 7 AM: Answering Service: [phone]  Date of service: 08-09-21 @ 10:02    RENAL FOLLOW UP NOTE  --------------------------------------------------------------------------------  HPI:  Pt seen and examined at bedside.     PAST HISTORY  --------------------------------------------------------------------------------  No significant changes to PMH, PSH, FHx, SHx, unless otherwise noted    ALLERGIES & MEDICATIONS  --------------------------------------------------------------------------------  Allergies    Allergy Status Unknown    Intolerances      Standing Inpatient Medications  aspirin  chewable 81 milliGRAM(s) Oral daily  furosemide    Tablet 80 milliGRAM(s) Oral two times a day  heparin   Injectable 5000 Unit(s) SubCutaneous every 12 hours  metoprolol tartrate 50 milliGRAM(s) Oral two times a day  nystatin Powder 1 Application(s) Topical two times a day  potassium chloride   Powder 20 milliEquivalent(s) Oral two times a day    PRN Inpatient Medications      REVIEW OF SYSTEMS  --------------------------------------------------------------------------------  General: no fever  CVS: no chest pain  RESP: no sob, no cough  ABD: no abdominal pain  : no dysuria  MSK: no edema     VITALS/PHYSICAL EXAM  --------------------------------------------------------------------------------  T(C): 36.6 (08-09-21 @ 05:39), Max: 36.6 (08-09-21 @ 05:39)  HR: 84 (08-09-21 @ 05:39) (79 - 94)  BP: 146/70 (08-09-21 @ 05:39) (138/82 - 148/77)  RR: 18 (08-09-21 @ 05:39) (18 - 18)  SpO2: 96% (08-09-21 @ 05:39) (95% - 96%)  Wt(kg): --        08-08-21 @ 07:01  -  08-09-21 @ 07:00  --------------------------------------------------------  IN: 540 mL / OUT: 2600 mL / NET: -2060 mL      Physical Exam:  	Gen: NAD  	HEENT: MMM  	Pulm: CTA B/L  	CV: S1S2  	Abd: Soft, +BS  	Ext: No LE edema B/L                      Neuro: Awake   	Skin: Warm and Dry     LABS/STUDIES  --------------------------------------------------------------------------------              8.5    6.49  >-----------<  95       [08-09-21 @ 07:01]              28.3     140  |  104  |  25  ----------------------------<  159      [08-09-21 @ 07:01]  3.5   |  23  |  1.47        Ca     7.4     [08-09-21 @ 07:01]      Creatinine Trend:  SCr 1.47 [08-09 @ 07:01]  SCr 1.58 [08-07 @ 07:08]  SCr 1.69 [08-06 @ 22:10]  SCr 1.75 [08-06 @ 07:22]  SCr 1.91 [08-05 @ 07:37]    Urinalysis - [08-04-21 @ 19:30]      Color Colorless / Appearance Clear / SG 1.007 / pH 6.0      Gluc Negative / Ketone Negative  / Bili Negative / Urobili Negative       Blood Trace / Protein Negative / Leuk Est Negative / Nitrite Negative      RBC 3 / WBC 0 / Hyaline 1 / Gran  / Sq Epi  / Non Sq Epi 0 / Bacteria Negative

## 2021-08-09 NOTE — PROGRESS NOTE ADULT - NSICDXPILOT_GEN_ALL_CORE
[place]

## 2021-08-09 NOTE — PROGRESS NOTE ADULT - SUBJECTIVE AND OBJECTIVE BOX
Patient is a 86y old  Male who presents with a chief complaint of Dyspnea (05 Aug 2021 13:06)    21  HPI:  Denies dyspneaNo new symptoms    PAST MEDICAL & SURGICAL HISTORY:      Review of Systems:   CONSTITUTIONAL: No fever, weight loss, or fatigue  EYES: No eye pain, visual disturbances, or discharge  ENMT:  No difficulty hearing, tinnitus, vertigo; No sinus or throat pain  NECK: No pain or stiffness  BREASTS: No pain, masses, or nipple discharge  RESPIRATORY: No cough, wheezing, chills or hemoptysis; No shortness of breath  CARDIOVASCULAR: No chest pain, palpitations, dizziness, or leg swelling  GASTROINTESTINAL: No abdominal or epigastric pain. No nausea, vomiting, or hematemesis; No diarrhea or constipation. No melena or hematochezia.  GENITOURINARY: No dysuria, frequency, hematuria, or incontinence  NEUROLOGICAL: No headaches, memory loss, loss of strength, numbness, or tremors  SKIN: No itching, burning, rashes, or lesions   LYMPH NODES: No enlarged glands  ENDOCRINE: No heat or cold intolerance; No hair loss  MUSCULOSKELETAL: No joint pain or swelling; No muscle, back, or extremity pain  PSYCHIATRIC: No depression, anxiety, mood swings, or difficulty sleeping  HEME/LYMPH: No easy bruising, or bleeding gums  ALLERY AND IMMUNOLOGIC: No hives or eczema    Allergies    Allergy Status Unknown    Intolerances        Social History:     FAMILY HISTORY:      MEDICATIONS  (STANDING):  aspirin  chewable 81 milliGRAM(s) Oral daily  furosemide   Injectable 80 milliGRAM(s) IV Push two times a day  heparin   Injectable 5000 Unit(s) SubCutaneous every 12 hours  metoprolol tartrate 25 milliGRAM(s) Oral two times a day  potassium chloride   Powder 20 milliEquivalent(s) Oral two times a day    MEDICATIONS  (PRN):        CAPILLARY BLOOD GLUCOSE        I&O's Summary    04 Aug 2021 07:  -  05 Aug 2021 07:00  --------------------------------------------------------  IN: 240 mL / OUT: 2800 mL / NET: -2560 mL    05 Aug 2021 07:01  -  05 Aug 2021 20:42  --------------------------------------------------------  IN: 960 mL / OUT: 900 mL / NET: 60 mL        PHYSICAL EXAM:  Vital Signs Last 24 Hrs  T(C): 36.4 (05 Aug 2021 17:42), Max: 36.7 (05 Aug 2021 04:10)  T(F): 97.5 (05 Aug 2021 17:42), Max: 98.1 (05 Aug 2021 04:10)  HR: 93 (05 Aug 2021 17:42) (85 - 105)  BP: 134/72 (05 Aug 2021 17:42) (116/73 - 136/64)  BP(mean): --  RR: 18 (05 Aug 2021 17:42) (18 - 18)  SpO2: 100% (05 Aug 2021 17:42) (99% - 100%)    GENERAL: NAD, well-developed  HEAD:  Atraumatic, Normocephalic  EYES: EOMI, PERRLA, conjunctiva and sclera clear  NECK: Supple, No JVD  CHEST/LUNG: Clear to auscultation bilaterally; No wheeze  HEART: Regular rate and rhythm; No murmurs, rubs, or gallops  ABDOMEN: Soft, Nontender, Nondistended; Bowel sounds present  EXTREMITIES:  2+ Peripheral Pulses, No clubbing, cyanosis, or edema  PSYCH: AAOx1  NEUROLOGY: non-focal  SKIN: No rashes or lesions    LABS:                        8.6    7.09  )-----------( 95       ( 05 Aug 2021 07:36 )             29.5     08-05    140  |  106  |  29<H>  ----------------------------<  123<H>  3.8   |  22  |  1.91<H>    Ca    8.1<L>      05 Aug 2021 07:37  Mg     1.5     08-05    TPro  5.8<L>  /  Alb  2.9<L>  /  TBili  0.5  /  DBili  x   /  AST  18  /  ALT  14  /  AlkPhos  275<H>  08-05          Urinalysis Basic - ( 04 Aug 2021 19:30 )    Color: Colorless / Appearance: Clear / S.007 / pH: x  Gluc: x / Ketone: Negative  / Bili: Negative / Urobili: Negative   Blood: x / Protein: Negative / Nitrite: Negative   Leuk Esterase: Negative / RBC: 3 /hpf / WBC 0 /HPF   Sq Epi: x / Non Sq Epi: 0 /hpf / Bacteria: Negative        RADIOLOGY & ADDITIONAL TESTS:    Imaging Personally Reviewed:    Consultant(s) Notes Reviewed:      Care Discussed with Consultants/Other Providers:

## 2021-08-09 NOTE — PROGRESS NOTE ADULT - PROBLEM SELECTOR PLAN 1
Diastolic HF noted. Cont Lasix. Cardiology FU noted. On PO Lasix as per cardiology  ZHAO: Improving with diuresis Diastolic HF noted. Cont Lasix. Cardiology FU noted. On PO Lasix as per cardiology  ZHAO: Improving with diuresis.  Low platelets noted: R/O HIT, Repeat CBC in a blue top tube.

## 2021-08-09 NOTE — PROGRESS NOTE ADULT - SUBJECTIVE AND OBJECTIVE BOX
Date of Service: 08/09/2021    S: Denies chest pain or SOB, remains on 1L NC. Review of systems otherwise (-)    Review of Systems:   Constitutional: [ ] fevers, [ ] chills.   Skin: [ ] dry skin. [ ] rashes.  Psychiatric: [ ] depression, [ ] anxiety.   Gastrointestinal: [ ] BRBPR, [ ] melena.   Neurological: [ ] confusion. [ ] seizures. [ ] shuffling gait.   Ears,Nose,Mouth and Throat: [ ] ear pain [ ] sore throat.   Eyes: [ ] diplopia.   Respiratory: [ ] hemoptysis. [ ] shortness of breath  Cardiovascular: See HPI above  Hematologic/Lymphatic: [ ] anemia. [ ] painful nodes. [ ] prolonged bleeding.   Genitourinary: [ ] hematuria. [ ] flank pain.   Endocrine: [ ] significant change in weight. [ ] intolerance to heat and cold.     Review of systems [x ] otherwise negative, [ ] otherwise unable to obtain    FH: no family history of sudden cardiac death in first degree relatives    SH: [ ] tobacco, [ ] alcohol, [ ] drugs    aspirin  chewable 81 milliGRAM(s) Oral daily  furosemide    Tablet 80 milliGRAM(s) Oral two times a day  heparin   Injectable 5000 Unit(s) SubCutaneous every 12 hours  metoprolol tartrate 50 milliGRAM(s) Oral two times a day  nystatin Powder 1 Application(s) Topical two times a day  potassium chloride   Powder 20 milliEquivalent(s) Oral two times a day                            8.5    6.49  )-----------( 95       ( 09 Aug 2021 07:01 )             28.3       08-09    140  |  104  |  25<H>  ----------------------------<  159<H>  3.5   |  23  |  1.47<H>    Ca    7.4<L>      09 Aug 2021 07:01      T(C): 36.6 (08-09-21 @ 05:39), Max: 36.6 (08-09-21 @ 05:39)  HR: 84 (08-09-21 @ 05:39) (79 - 94)  BP: 146/70 (08-09-21 @ 05:39) (138/82 - 148/77)  RR: 18 (08-09-21 @ 05:39) (18 - 18)  SpO2: 96% (08-09-21 @ 05:39) (95% - 96%)  Wt(kg): --    I&O's Summary    08 Aug 2021 07:01  -  09 Aug 2021 07:00  --------------------------------------------------------  IN: 540 mL / OUT: 2600 mL / NET: -2060 mL    09 Aug 2021 07:01  -  09 Aug 2021 13:32  --------------------------------------------------------  IN: 240 mL / OUT: 0 mL / NET: 240 mL    Appearance: calm and pleasant elderly white male, oriented to name only  HEENT:   Normal oral mucosa, PERRL, EOMI	  Lymphatic: No lymphadenopathy , no edema  Cardiovascular: irregular S1 S2, No murmurs , Peripheral pulses palpable 1+ bilaterally  Respiratory: CTA B/L  Gastrointestinal:  Soft, Non-tender, + BS	  Skin: No rashes, No ecchymoses, No cyanosis, warm to touch, diffuse bruising  Musculoskeletal: limited ROM due to edema  Psychiatry:  Mood is "im fine" & affect is restricted    TELEMETRY: None  ECG:  AF, RBBB  Echo: Hyperdynamic (EF >75%), LVH, calcified aortic valve without AS, mild MR, dilated LA, elevated RA pressure.    ASSESSMENT/PLAN: 86y Male admitted w/ albertina, urgently dropped off by family who drove him back from [place]. 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 Stroke 1 yr ago, recent falls and rapid deterioration.    - cont Aspirin 81mg daily for Watchman, stroke secondary prevention.  - volume status improved - transitioned to Lasix 80mg PO BID (which may decrease as his kidney function improves)  - wean O2 as tolerated - check O2 sats on room air today  - Cardiorenal syndrome was present on admission, improving w. lasix.  - As needed, replace K>4, mg >2.2    - Continue metoprolol 50mg BID for rate control  - No further inpatient cardiac w/u planned    Darwin Pickett PA-C  Pager: [phone]

## 2021-08-10 NOTE — DISCHARGE NOTE PROVIDER - PROVIDER TOKENS
PROVIDER:[TOKEN:[[id number]]] PROVIDER:[TOKEN:[[id number]]],PROVIDER:[TOKEN:[[id number]],FOLLOWUP:[2 weeks]]

## 2021-08-10 NOTE — PROGRESS NOTE ADULT - PROVIDER SPECIALTY LIST ADULT
Cardiology
Internal Medicine
Cardiology
Nephrology
Podiatry
Nephrology
Nephrology
Podiatry
Nephrology
Internal Medicine

## 2021-08-10 NOTE — PROGRESS NOTE ADULT - PROBLEM SELECTOR PLAN 1
Diastolic HF noted. Cont Lasix. Cardiology FU noted. On PO Lasix as per cardiology  ZHAO: Improving with diuresis.  Low platelets noted: R/O HIT, Repeat CBC in a blue top tube.

## 2021-08-10 NOTE — PROGRESS NOTE ADULT - PROBLEM SELECTOR PLAN 2
[place], CHF contributing. PT evaluation
[place], CHF contributing. PT evaluation
[place], CHF contributing. PT evaluation
[place], CHF contributing. PT evaluation
[place], CHF contributing. PT evaluation

## 2021-08-10 NOTE — CHART NOTE - NSCHARTNOTEFT_GEN_A_CORE
Patient is a 86y old  Male who presents with a chief complaint of Dyspnea (10 Aug 2021 15:59)      Vital Signs Last 24 Hrs  T(C): 36.6 (10 Aug 2021 11:42), Max: 36.6 (09 Aug 2021 21:19)  T(F): 97.8 (10 Aug 2021 11:42), Max: 97.9 (09 Aug 2021 21:19)  HR: 72 (10 Aug 2021 11:42) (72 - 100)  BP: 122/74 (10 Aug 2021 11:42) (101/67 - 150/79)  BP(mean): --  RR: 18 (10 Aug 2021 16:45) (18 - 18)  SpO2: 95% (10 Aug 2021 16:45) (88% - 98%)      Labs:                          8.5    6.49  )-----------( 95       ( 09 Aug 2021 07:01 )             28.3     08-10    136  |  101  |  28<H>  ----------------------------<  180<H>  4.5   |  26  |  1.76<H>    Ca    8.3<L>      10 Aug 2021 07:23  Mg     1.5     08-10    Radiology:    Physical Exam:  General: WN/WD NAD  Neurology: A&Ox3, nonfocal, [place] x 4  Head:  Normocephalic, atraumatic  Respiratory: CTA B/L  CV: RRR, S1S2, no murmur  Abdominal: Soft, NT, ND no palpable mass  MSK: No edema, + peripheral pulses, FROM all 4 extremity    Discharge Note and Plan:  Discussed with  > Soila patient medically Cleared for discharge   >Follow up with Cardiology outpatient Appt schedule after rehab 9/1 12:10pm   patient STAR so Schedule appt made   Nephrology cleared patient follow up with creatinine outpatient   Cleared from Nephrology and Cardiology for discharge   Discharge on lasix 80mg daily   Discharge on oxygen supplement patient sat on room air 88% continue with oxygen wean as rehab   Discharge with jovanni
Podiatry was called for pt w/ foot wounds to see. will defer to DPM. d/w team, remain available as requested.
RD CHF education chart note    Patient was off the floor at [facility] when RD attempted to visit for CHF education. pt is disoriented and confused as per flow sheet. pt is inappropriate to educate. Heart failure education was left at bedside to bring home for support person, in detail including heart failure nutrition therapy, sodium and fluid intake, the importance of weight gain parameters and importance of contacting MD’s about weight changes. Handouts provided include heart failure nutrition therapy, reading heart healthy nutrition labels, heart healthy shopping tips and low sodium food list. Patient unable to verbalize understanding at this time. RD contact information left with patient for any future questioning.
Reason for Notification:   Notified by RN and Telemetry that the above patient had a run of wide complex tachycardia on monitor for 16 beats. This the patient's first episode of wide complex.     Events/History of Present Illness  Pt saw and examined at bedside. Patient at baseline status without acute change in condition or complaints. Vitals stable. Patient was not aware of the wide complex. Denies HA, dizziness, CP, SOB, abdominal pain, LE swelling.     Review of Systems:  Constitutional: No fever, chills, or fatigue.  Neurologic: No headache, dizziness, vision/speech changes, numbness, or weakness.  Respiratory: No cough, wheezing, dyspnea, or shortness of breath.  Cardiovascular: No chest pain, pressure, or palpitations.   GI: No abdominal pain, nausea, vomiting, diarrhea, constipation.   : No dysuria, burning, frequency, incontinence, or retention.   Skin: No itching, burning, rashes, or lesions .  Musculoskeletal: No joint pain or swelling.   Psychiatric: No depression, anxiety, or mood swings.    T(C): 36.7 (08-06-21 @ 21:29), Max: 36.7 (08-06-21 @ 12:59)  HR: 100 (08-06-21 @ 21:29) (75 - 100)  BP: 134/79 (08-06-21 @ 21:29) (128/75 - 157/85)  RR: 18 (08-06-21 @ 21:29) (18 - 18)  SpO2: 99% (08-06-21 @ 21:29) (99% - 100%)                        9.0    8.35  )-----------( 105      ( 06 Aug 2021 07:22 )             30.6        CARDIAC MARKERS ( 06 Aug 2021 07:22 )  x     / x     / 28 U/L / x     / x            Physical Examination:  GENERAL: No acute distress noted during examination.  CHEST: Clear to ascultation bilaterally. No rales, rhonchi, wheezing, or rubs heard. Symmetrical/bilateral chest wall rise.   HEART: Regular rate and rhythm with no murmurs, rubs, or gallops.  ABDOMEN: Soft, nontender, and nondistended.   EXTREMITIES:  2+ Peripheral Pulses without clubbing, cyanosis, or edema.  LYMPH: No lymphadenopathy noted.      Assessment/Plan:  87 yo male with pmh htn, hld, CHF here for worsening confusion and sob. Per pts daughter over the phone, pt recently admitted to the hospital in [place], moved up here for medical attention and in interim developed worsening sob. pt non contributory to history, daughter denies recent fevers, chills, difficulty tolerating po. No recent falls. Chest X ray shows pulm vascular congestion. He also has elevated d dimer to 770.     Pt now with first episode of 16 beats wide complex. pt asymptomatic and lying in bed comfortably.     Plan  - Stat BMP/Mag to evaluate for electrolytes. Will replace as needed.   - Continue cardiac monitoring  - EKG obtained and reviewed  - discussed with on call cardiology Dr. Dalton  - will f/u with day team    Geno Fuchs PA-C  Department of Medicine

## 2021-08-10 NOTE — PROGRESS NOTE ADULT - ASSESSMENT
85 yo male with pmh htn, hld, CHF here for worsening confusion and sob.     ZHAO vs. CKD  SCr currently elevated 1.9 on admission   baseline unknown   SCr fluctuating   s/p diuresis now PO   UA bland with trace RBCS  traumatic baig. urine clearing up   CK level normal   Renal sonogram w/o hydronephrosis. noted cysts   repeat UA   avoid nephrotoxins     Anemia  Hb low  monitor hb     HTN  BP controlled   
85 yo male with pmh htn, hld, CHF here for worsening confusion and sob.     ZHAO vs. CKD  SCr currently elevated 1.9 on admission   baseline unknown   SCr improving w/ diuresis   UA bland with trace RBCS  CK level normal   Renal sonogram w/o hydronephrosis. noted cysts   trend BMP   repeat UA   avoid nephrotoxins     Anemia  Hb low  monitor hb     HTN  BP controlled   
87 yo male with pmh htn, hld, CHF here for worsening confusion and sob.     ZHAO vs. CKD  SCr currently elevated  baseline unknown   Can continue on lasix at present   UA bland with trace RBCS  check CK level   check renal sonogram   trend BMP   repeat UA   avoid nephrotoxins     Anemia  Hb low  monitor hb     HTN  BP controlled   
85 yo male with pmh htn, hld, CHF here for worsening confusion and sob.     ZHAO vs. CKD  SCr currently elevated 1.9 on admission   baseline unknown   SCr improving w/ diuresis   UA bland with trace RBCS  CK level normal   Renal sonogram w/o hydronephrosis. noted cysts   trend BMP   repeat UA   avoid nephrotoxins     Anemia  Hb low  monitor hb     HTN  BP controlled   
87 yo male with pmh htn, hld, CHF here for worsening confusion and sob.     ZHAO vs. CKD  SCr currently elevated 1.9 on admission   baseline unknown   SCr improving  s/p diuresis   UA bland with trace RBCS  CK level normal   Renal sonogram w/o hydronephrosis. noted cysts   repeat UA   avoid nephrotoxins     Anemia  Hb low  monitor hb     HTN  BP controlled   
Assessment/Plan;    left heel wound--stable/noninfected  right dorsal foot wound--stable/noninfected  right second toe abrasion--stable    recommend santyl and allevyne foam dressing to left heel daily  recommend mupirocin ointment/adaptic touch and dsd to right dorsal wound  recommend decubitus precautions and z flow offloading boots  will continue to monitor for signs of infection and wound care recommendations
Assessment/Plan;    left heel wound--stable/noninfected  right dorsal foot wound--stable/noninfected  right second toe abrasion--stable    recommend santyl and allevyne foam dressing to left heel daily  recommend mupirocin ointment/adaptic touch and dsd to right dorsal wound  recommend decubitus precautions and z flow offloading boots  will continue to monitor for signs of infection and wound care recommendations
85 yo male with pmh htn, hld, CHF here for worsening confusion and sob.     ZHAO vs. CKD  SCr currently elevated 1.9 on admission   baseline unknown   SCr improving w/ diuresis   UA bland with trace RBCS  CK level normal   Renal sonogram w/o hydronephrosis. noted cysts   trend BMP   repeat UA   avoid nephrotoxins     Anemia  Hb low  monitor hb     HTN  BP controlled

## 2021-08-10 NOTE — CONSULT NOTE ADULT - SUBJECTIVE AND OBJECTIVE BOX
Wound Surgery Consult Note:    HPI:  87 yo male with pmh htn, hld, CHF here for worsening confusion and sob. Per pts daughter over the phone, pt recently admitted to the hospital in [place], moved up here for medical attention and in interim developed worsening sob. pt non contributory to history, daughter denies recent fevers, chills, difficulty tolerating po. No recent falls. Chest X ray shows pulm vascular congestion. He also has elevated d dimer to 770. (04 Aug 2021 13:33)    Request for wound care consult for sacral/bilateral buttocks skin irritation received from nursing. On exam, Mr. Bryant was very confused. Questions, instructions needed to be repeated several times with no better comprehension. He was compliant with exam, however, did not follow simple commands, He was grossly incontinent of stool/mucus per rectum.    PAST MEDICAL & SURGICAL HISTORY:  HTN  HLD  CHF    REVIEW OF SYSTEMS  Patient is very confused, ROS from medical chart  General:  no 	fevers or chills  Respiratory and Thorax:  worsening SOB  Cardiovascular:	CHF  Gastrointestinal:	tolerating diet  Neurological:	confused  Skin: no chronic wounds    MEDICATIONS  (STANDING):  aspirin  chewable 81 milliGRAM(s) Oral daily  furosemide    Tablet 80 milliGRAM(s) Oral two times a day  heparin   Injectable 5000 Unit(s) SubCutaneous every 12 hours  metoprolol tartrate 50 milliGRAM(s) Oral two times a day  nystatin Powder 1 Application(s) Topical two times a day  potassium chloride   Powder 20 milliEquivalent(s) Oral two times a day    MEDICATIONS  (PRN):    Allergies    Allergy Status Unknown    Intolerances    SOCIAL HISTORY:  unable to obtain due to patient's condition    FAMILY HISTORY: unable to obtain due to patient's condition    Vital Signs Last 24 Hrs  T(C): 36.6 (10 Aug 2021 05:26), Max: 36.6 (09 Aug 2021 21:19)  T(F): 97.9 (10 Aug 2021 05:26), Max: 97.9 (09 Aug 2021 21:19)  HR: 100 (10 Aug 2021 05:26) (97 - 100)  BP: 101/67 (10 Aug 2021 05:26) (101/67 - 150/79)  BP(mean): --  RR: 18 (10 Aug 2021 05:26) (18 - 18)  SpO2: 97% (10 Aug 2021 05:26) (96% - 97%)    Physical Exam:  General: Confused, frail   Respiratory: no SOB on room air  Gastrointestinal: soft NT/ND  Neurology: verbal, not following commands  Musculoskeletal: no contractures  Vascular: BLE edema equal  Skin:  Bilateral buttocks/gluteal cleft, sacrum and perineum with bright red erythema, satellite lesions, weepy skin, +TTP   No odor, increased warmth, induration, fluctuance    LABS:  08-10    136  |  101  |  28<H>  ----------------------------<  180<H>  4.5   |  26  |  1.76<H>    Ca    8.3<L>      10 Aug 2021 07:23  Mg     1.5     08-10                            8.5    6.49  )-----------( 95       ( 09 Aug 2021 07:01 )             28.3               
Cardiology    HISTORY OF PRESENT ILLNESS: HPI:  85 yo male with pmh htn, hld, CHF here for worsening confusion and sob. Per pts daughter over the phone, pt recently admitted to the hospital in [place], moved up here for medical attention and in interim developed worsening sob. pt non contributory to history, daughter denies recent fevers, chills, difficulty tolerating po. No recent falls. Chest X ray shows pulm vascular congestion. He also has elevated d dimer to 770. (04 Aug 2021 13:33)    Mr Bryant is a pleasant 86yoM recently brought up from [place] for medical care closer to his family. He has been hospitalized a few times for CHF.  He is oriented only to name and does not know what his medical problems are.  He states he has not walked in some time, he is having shortness of breath, fatigue, difficulty lying flat.  10pt ROS attempted but patient is admittedly unsure about much of his symptoms.    Per daughter (Barbara, [phone])  Moved to [place] x 2 yrs after traveling back and forth for many years.  In May 2021, was living independently, mildly forgetful but short-tempered.  Was still driving and doing errands.  Family has been looking into assisted living in [facility], trying to transition him before serious illness but he refused xfer.  Had back to back falls / stuck on the floor 6/2021 two days apart.  By 6/15, went to rehab.   In and out of [facility] 3x in 6 weeks    PAST MEDICAL & SURGICAL HISTORY:  MI age 42  CHF  Watchman- ~2015  Atrial Fibrillation (chronic, permanent)  Myelodysplastic syndrome ?  Stroke 8/202  Diabetes, not on insulin    MEDICATIONS  (STANDING):  furosemide   Injectable 40 milliGRAM(s) IV Push daily  heparin   Injectable 5000 Unit(s) SubCutaneous every 12 hours    Allergies    Allergy Status Unknown    Intolerances    FAMILY HISTORY:    Non-contributary for premature coronary disease or sudden cardiac death    SOCIAL HISTORY:    [ x] Non-smoker  [ ] Smoker  [ ] Alcohol    PHYSICAL EXAM:  T(C): 36.4 (08-04-21 @ 11:34), Max: 37 (08-04-21 @ 05:35)  HR: 83 (08-04-21 @ 11:34) (81 - 99)  BP: 153/77 (08-04-21 @ 11:34) (101/77 - 153/77)  RR: 18 (08-04-21 @ 11:34) (18 - 19)  SpO2: 100% (08-04-21 @ 11:34) (92% - 100%)  Wt(kg): --    Appearance: calm and pleasant elderly white male, oriented to name only  HEENT:   Normal oral mucosa, PERRL, EOMI	  Lymphatic: No lymphadenopathy , no edema  Cardiovascular: irregular S1 S2, + JVD, No murmurs , Peripheral pulses palpable 1+ bilaterally  Respiratory: poor air entry BL, rales.  Gastrointestinal:  Soft, Non-tender, + BS	  Skin: No rashes, No ecchymoses, No cyanosis, warm to touch, diffuse bruising  Musculoskeletal: limited ROM due to edema  Psychiatry:  Mood is "im fine" & affect is restricted    TELEMETRY: AFib, RBBB, HR controlled.	    ECG:  AF, RBBB  Echo: pending                          9.2    14.81 )-----------( 136      ( 04 Aug 2021 08:07 )             30.5     08-04    142  |  107  |  32<H>  ----------------------------<  152<H>  3.9   |  25  |  1.93<H>    Ca    8.7      04 Aug 2021 08:07    TPro  6.4  /  Alb  3.3  /  TBili  0.6  /  DBili  x   /  AST  22  /  ALT  15  /  AlkPhos  308<H>  08-04    proBNP: Serum Pro-Brain Natriuretic Peptide: 68934 pg/mL (08-04 @ 08:07)    ASSESSMENT/PLAN: 	86y Male admitted w/ anasarca, urgently dropped off by family who drove him back from [place].  Hx of CHF (unknown EF), prior MI 40+yrs ago, chronic AFib, Watchman. Stroke 1 yr ago, recent falls and rapid deterioration.    Start Aspirin 81mg daily for Watchman, stroke secondary prevention.  Lasix adjusted to 80mg IV BID.  Added KCl 40mEQ BID.  Echo ordered and pending.  Will follow.    Family requesting palliative care consult.  States he is a DNR.  Requesting MOLST form to be filled out together.  Daughter, Barbara     Dennis Hickman M.D.  Cardiac Electrophysiology    office [phone]  pager [phone]
[facility]   MD Neo Stein MD, D.O.  MAYA Whalen    From 7 AM - 5 PM:  OFFICE: [phone]  Dr. Yuan cell: [phone]  Dr. Gilliam Cell: [phone]  Dr. Baez cell: [phone]  MAYA Jones cell: [phone]    From 5 PM - 7 AM: Answering Service: [phone]  Date of service 08-04-21 @ 20:16    -- INITIAL RENAL CONSULT NOTE  --------------------------------------------------------------------------------  HPI:  85 yo male with pmh htn, hld, CHF here for worsening confusion and sob. Per pts daughter over the phone, pt recently admitted to the hospital in [place], moved up here for medical attention and in interim developed worsening sob. pt non contributory to history, daughter denies recent fevers, chills, difficulty tolerating po. No recent falls. Chest X ray shows pulm vascular congestion. He also has elevated d dimer to 770.     PAST HISTORY  --------------------------------------------------------------------------------  PAST MEDICAL & SURGICAL HISTORY:    FAMILY HISTORY:    PAST SOCIAL HISTORY:    ALLERGIES & MEDICATIONS  --------------------------------------------------------------------------------  Allergies    Allergy Status Unknown    Intolerances      Standing Inpatient Medications  furosemide   Injectable 80 milliGRAM(s) IV Push two times a day  heparin   Injectable 5000 Unit(s) SubCutaneous every 12 hours  potassium chloride    Tablet ER 40 milliEquivalent(s) Oral two times a day    PRN Inpatient Medications      REVIEW OF SYSTEMS  --------------------------------------------------------------------------------  Gen: No fevers/chills  Skin: No rashes  Head/Eyes/Ears: Normal hearing,  Normal vision   Respiratory: No dyspnea, cough  CV: No chest pain  GI: No abdominal pain, diarrhea, constipation, nausea, vomiting  : No dysuria, hematuria  MSK: No  edema  Heme: No easy bruising or bleeding  Psych: No significant depression    All other systems were reviewed and are negative, except as noted.    VITALS/PHYSICAL EXAM  --------------------------------------------------------------------------------  T(C): 36.4 (08-04-21 @ 11:34), Max: 37 (08-04-21 @ 05:35)  HR: 92 (08-04-21 @ 18:00) (81 - 99)  BP: 147/73 (08-04-21 @ 18:00) (101/77 - 153/77)  RR: 18 (08-04-21 @ 11:34) (18 - 19)  SpO2: 100% (08-04-21 @ 11:34) (92% - 100%)  Wt(kg): --    08-04-21 @ 07:01  -  08-04-21 @ 20:16  --------------------------------------------------------  IN: 240 mL / OUT: 800 mL / NET: -560 mL      Physical Exam:  	Gen: NAD  	HEENT: MMM  	Pulm: CTA B/L  	CV: S1S2  	Abd: Soft, +BS   	Ext: + LE edema B/L  	Neuro: Awake  	Skin: Warm and dry      LABS/STUDIES  --------------------------------------------------------------------------------              9.2    14.81 >-----------<  136      [08-04-21 @ 08:07]              30.5     142  |  107  |  32  ----------------------------<  152      [08-04-21 @ 08:07]  3.9   |  25  |  1.93        Ca     8.7     [08-04-21 @ 08:07]    TPro  6.4  /  Alb  3.3  /  TBili  0.6  /  DBili  x   /  AST  22  /  ALT  15  /  AlkPhos  308  [08-04-21 @ 08:07]      Creatinine Trend:  SCr 1.93 [08-04 @ 08:07]    Urinalysis - [08-04-21 @ 19:30]      Color Colorless / Appearance Clear / SG 1.007 / pH 6.0      Gluc Negative / Ketone Negative  / Bili Negative / Urobili Negative       Blood Trace / Protein Negative / Leuk Est Negative / Nitrite Negative      RBC 3 / WBC 0 / Hyaline 1 / Gran  / Sq Epi  / Non Sq Epi 0 / Bacteria Negative          
HPI:  85 yo male with pmh htn, hld, CHF here for worsening confusion and sob. Per pts daughter over the phone, pt recently admitted to the hospital in [place], moved up here for medical attention and in interim developed worsening sob. pt non contributory to history, daughter denies recent fevers, chills, difficulty tolerating po. No recent falls. Chest X ray shows pulm vascular congestion. He also has elevated d dimer to 770. (04 Aug 2021 13:33)    PERTINENT PM/SXH:     Reportedly with a wife who suffers from dementia according to the son.  The son asserts the patient's hearing aids were lost upon admission.  His son also explains that both he and his sister were quite dissatisfied with the care in [place].  They felt it was prudent to return the patient to [place], receive care at [facility] (the site where he had his MI treat decades ago). There were a number of questions about general medical concerns.  The patient is very hard of hearing and given the loss of his hearing aids, I provided him with printed questions and assessment tools to gauge understanding and symptom burden.    FAMILY HISTORY:    ITEMS NOT CHECKED ARE NOT PRESENT    SOCIAL HISTORY:   Significant other/partner[x ]  Children[x ]  Evangelical/Spirituality:  Substance hx:  [ ]   Tobacco hx:  [ ]   Alcohol hx: [ ]   Home Opioid hx:  [ ] I-Stop Reference No:  Living Situation: [ x]Home  [ ]Long term care  [ ]Rehab [ ]Other    ADVANCE DIRECTIVES:    DNR  MOLST  [ ]  Living Will  [ ]   DECISION MAKER(s):  [ ] Health Care Proxy(s)  [x ] Surrogate(s)  [ ] Guardian           Name(s): Phone Number(s):    BASELINE (I)ADL(s) (prior to admission):  [place]: [ ]Total  [ ] Moderate [ ]Dependent    Allergies    Allergy Status Unknown    Intolerances    MEDICATIONS  (STANDING):  furosemide   Injectable 80 milliGRAM(s) IV Push two times a day  heparin   Injectable 5000 Unit(s) SubCutaneous every 12 hours  potassium chloride   Powder 20 milliEquivalent(s) Oral two times a day    MEDICATIONS  (PRN):    PRESENT SYMPTOMS: [ ]Unable to obtain due to poor mentation   Source if other than patient:  [ ]Family   [ ]Team     Pain: [ ]yes [x ]no  QOL impact -   Location -                    Aggravating factors -  Quality -  Radiation -  Timing-  Severity (0-10 scale):  Minimal acceptable level (0-10 scale):     PAIN AD Score: 0    [email or web address].[email or web address]/[email or web address] (press ctrl +  left click to view)    Dyspnea:                           [ ]Mild [ ]Moderate [ ]Severe  Anxiety:                             [ ]Mild [ ]Moderate [ ]Severe  Fatigue:                             [ ]Mild [ ]Moderate [ ]Severe  Nausea:                             [ ]Mild [ ]Moderate [ ]Severe  Loss of appetite:              [ ]Mild [ ]Moderate [ ]Severe  Constipation:                    [ ]Mild [ ]Moderate [ ]Severe    Other Symptoms:  [ x]All other review of systems negative     Palliative Performance Status Version 2:     60  %    http://[facility].org/files/news/palliative_performance_scale_ppsv2.pdf  PHYSICAL EXAM:  Vital Signs Last 24 Hrs  T(C): 36.6 (05 Aug 2021 11:46), Max: 36.7 (05 Aug 2021 04:10)  T(F): 97.8 (05 Aug 2021 11:46), Max: 98.1 (05 Aug 2021 04:10)  HR: 105 (05 Aug 2021 11:46) (85 - 105)  BP: 116/73 (05 Aug 2021 11:46) (116/73 - 147/73)  BP(mean): --  RR: 18 (05 Aug 2021 11:46) (18 - 18)  SpO2: 99% (05 Aug 2021 11:46) (99% - 100%) I&O's Summary    04 Aug 2021 07:01  -  05 Aug 2021 07:00  --------------------------------------------------------  IN: 240 mL / OUT: 2800 mL / NET: -2560 mL    05 Aug 2021 07:01  -  05 Aug 2021 13:08  --------------------------------------------------------  IN: 180 mL / OUT: 0 mL / NET: 180 mL      GENERAL:  [x ]Alert  [x ]Oriented x  2  [ ]Lethargic  [ ]Cachexia  [ ]Unarousable  [ ]Verbal  [ ]Non-Verbal  Behavioral:   [ ] Anxiety  [ ] Delirium [ ] Agitation [x ] Other Calm  HEENT:  [  ]Normal   [x ]Dry mouth   [ ]ET Tube/Trach  [ ]Oral lesions  PULMONARY:   [x ]Clear [ ]Tachypnea  [ ]Audible excessive secretions   [ ]Rhonchi        [ ]Right [ ]Left [ ]Bilateral  [ ]Crackles        [ ]Right [ ]Left [ ]Bilateral  [ ]Wheezing     [ ]Right [ ]Left [ ]Bilatera  [ ]Diminished breath sounds [ ]right [ ]left [ ]bilateral  CARDIOVASCULAR:    [ x]Regular [ ]Irregular [ ]Tachy  [ ]Cipriano [ ]Murmur [ ]Other  GASTROINTESTINAL:  [ x]Soft  [ ]Distended   [ ]+BS  [x ]Non tender [ ]Tender  [ ]PEG [ ]OGT/ NGT  Last BM:   GENITOURINARY:  [ x]Normal [ ] Incontinent   [ ]Oliguria/Anuria   [ ]Agee  MUSCULOSKELETAL:   [  ]Normal   [x ]Weakness  [ ]Bed/Wheelchair bound [ ]Edema  NEUROLOGIC:   [ x]No focal deficits  [ ]Cognitive impairment  [ ]Dysphagia [ ]Dysarthria [ ]Paresis [ ]Other   SKIN:   [x ]Normal    [ ]Rash  [ ]Pressure ulcer(s)       Present on admission [ ]y [ ]n    CRITICAL CARE:  [ ] Shock Present  [ ]Septic [ ]Cardiogenic [ ]Neurologic [ ]Hypovolemic  [ ]  Vasopressors [ ]  Inotropes   [ ]Respiratory failure present [ ]Mechanical ventilation [ ]Non-invasive ventilatory support [ ]High flow  [ ]Acute  [ ]Chronic [ ]Hypoxic  [ ]Hypercarbic [ ]Other  [ ]Other organ failure     LABS:                        8.6    7.09  )-----------( 95       ( 05 Aug 2021 07:36 )             29.5   08-05    140  |  106  |  29<H>  ----------------------------<  123<H>  3.8   |  22  |  1.91<H>    Ca    8.1<L>      05 Aug 2021 07:37  Mg     1.5     08-05    TPro  5.8<L>  /  Alb  2.9<L>  /  TBili  0.5  /  DBili  x   /  AST  18  /  ALT  14  /  AlkPhos  275<H>  08-05      Urinalysis Basic - ( 04 Aug 2021 19:30 )    Color: Colorless / Appearance: Clear / S.007 / pH: x  Gluc: x / Ketone: Negative  / Bili: Negative / Urobili: Negative   Blood: x / Protein: Negative / Nitrite: Negative   Leuk Esterase: Negative / RBC: 3 /hpf / WBC 0 /HPF   Sq Epi: x / Non Sq Epi: 0 /hpf / Bacteria: Negative      RADIOLOGY & ADDITIONAL STUDIES:    PROTEIN CALORIE MALNUTRITION PRESENT: [ ]mild [ ]moderate [ ]severe [ ]underweight [ ]morbid obesity  https://www.andeal.org/vault/2440/web/files/ONC/Table_Clinical%20Characteristics%20to%20Document%20Malnutrition-White%20JV%20et%20al%2020.pdf        [ ]PPSV2 < or = to 30% [ ]significant weight loss  [ ]poor nutritional intake  [ ]anasarca      [ ]Artificial Nutrition      REFERRALS:   [ ]Chaplaincy  [ ]Hospice  [ ]Child Life  [ ]Social Work  [ ]Case management [ ]Holistic Therapy     Goals of Care Document:

## 2021-08-10 NOTE — CONSULT NOTE ADULT - ASSESSMENT
85 yo male with pmh htn, hld, CHF here for worsening confusion and sob.     ZHAO vs. CKD  SCr currently elevated  baseline unknown   Can continue on lasix at present   UA bland with trace RBCS  check CK level   check renal sonogram   trend BMP   repeat UA   avoid nephrotoxins     Anemia  Hb low  monitor hb     HTN  BP controlled   
Impression:    Incontinence of bowel  Incontinence dermatitis  Suggestive of fungal rash  Pressure injury prophylaxis    Recommend:  1.) topical therapy: bilateral buttocks/sacrum/perineum - cleanse with Incontinence cleanser, pat dry, apply Mckenzie Antifungal BID and PRN for incontinent episodes  On discharge, order Clotrimazole 2% cream BID to affected area  2.) Incontinence management - incontinence cleanser, pads, pericare BID  3.) Maintain on an alternating air with low air loss surface  4.) Turn and reposition q 2 hours  5.) Nutrition optimization  6.) Offload heels/feet with complete care air fluidized boots/ ensure soles of feet do not rest on the foot board of the bed    Care as per medicine. Will not actively follow but will remain available. Please recall for new issues or deterioration.  Upon [facility] [address] [place] [address] [phone]  Discussed with clinical nurse  Thank you for this consult  Isabela Mathur, GREGORIO-C, CWOCN [id number]

## 2021-08-10 NOTE — DISCHARGE NOTE PROVIDER - HOSPITAL COURSE
·  Problem: Congestive heart failure, unspecified HF chronicity, unspecified heart failure type.  Plan: Diastolic HF noted. Cont Lasix. Cardiology FU noted. On PO Lasix as per cardiology  ZHAO: Improving with diuresis.  Low platelets noted: R/O HIT, Repeat CBC in a blue top tube.      Problem/Plan - 2:  ·  Problem: Debility.  Plan: [place], CHF contributing. PT evaluation.      Problem/Plan - 3:  ·  Problem: Benign essential hypertension.  Plan: FU on BB, LAsix           85 yo male with pmh htn, hld, CHF here for worsening confusion and sob.      Congestive heart failure, unspecified HF chronicity, unspecified heart failure type.  Plan: Diastolic HF noted. Cont Lasix. Cardiology FU noted. On PO Lasix as per cardiology  ZHAO: Improving with diuresis.  Low platelets noted: R/O HIT, Repeat CBC in a blue top tube.      Problem/Plan - 2:  ·  Problem: Debility.  Plan: [place], CHF contributing. PT evaluation.      Problem/Plan - 3:  ·  Problem: Benign essential hypertension.  Plan: FU on BB, LAsix           85 yo male with pmh htn, hld, CHF here for worsening confusion and sob.   Congestive heart failure, unspecified HF chronicity, unspecified heart failure type.  Plan: Diastolic HF noted. Cont Lasix. Cardiology appreciated  noted. On PO Lasix as per cardiology  ZHAO: Improving with diuresis. nephrology appreciated  serum creatinine fluctuating   Low platelets noted: R/O HIT, Repeat CBC in a blue top tube.   : Debility.  Plan: [place], CHF contributing. PT evaluation. recommend OTTO           87 yo male with pmh htn, hld, CHF here for worsening confusion and sob.   Congestive heart failure, unspecified HF chronicity, unspecified heart failure type.  Plan: Diastolic HF noted. Cont Lasix. Cardiology appreciated  noted. On PO Lasix as per cardiology  ZHAO: Improving with diuresis. nephrology appreciated  serum creatinine fluctuating   Low platelets noted: R/O HIT, Repeat CBC in a blue top tube.   : Debility.  Plan: [place], CHF contributing. PT evaluation. recommend OTTO  - Cardiorenal syndrome was present on admission, improving w. lasix.  - As needed, replace K>4, mg >2.2    - Continue metoprolol 50mg BID for rate control  - No further inpatient cardiac w/u planned  -     85 yo male with pmh htn, hld, CHF here for worsening confusion and sob.   Congestive heart failure, unspecified HF chronicity, unspecified heart failure type.  Plan: Diastolic HF noted. Cont Lasix. Cardiology appreciated  noted. On PO Lasix as per cardiology  ZHAO: Improving with diuresis. nephrology appreciated  serum creatinine fluctuating   Low platelets noted: R/O HIT, Repeat CBC in a blue top tube.   : Debility.  Plan: [place], CHF contributing. PT evaluation. recommend OTTO   Cardiorenal syndrome was present on admission, improving w. lasix.  - As needed, replace K>4, mg >2.2    - Continue metoprolol 50mg BID for rate control  - No further inpatient cardiac w/u planned  Patient medically cleared for discharge 	86y Male admitted w/ anasarca, urgently dropped off by family who drove him back from [place]. 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 Stroke 1 yr ago, recent falls and rapid deterioration with pmh htn, hld, CHF here for worsening confusion and sob.   Congestive heart failure, unspecified HF chronicity, unspecified heart failure type.  Plan: Diastolic HF noted. Cont Lasix. Cardiology appreciated  noted. On PO Lasix as per cardiology  ZHAO: Improving with diuresis. nephrology appreciated  serum creatinine fluctuating   Low platelets noted: R/O HIT, Repeat CBC in a blue top tube.   : Debility.  Plan: [place], CHF contributing. PT evaluation. recommend OTTO   Cardiorenal syndrome was present on admission, improving w. lasix.  - As needed, replace K>4, mg >2.2    - Continue metoprolol 50mg BID for rate control  - No further inpatient cardiac w/u planned  Patient medically cleared for discharge 	86y Male admitted w/ anasarca, urgently dropped off by family who drove him back from [place]. 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 Stroke 1 yr ago, recent falls and rapid deterioration with pmh htn, hld, CHF here for worsening confusion and sob.   Congestive heart failure, unspecified HF chronicity, unspecified heart failure type.  Plan: Diastolic HF noted. Cont Lasix. Cardiology appreciated  noted. On PO Lasix as per cardiology  ZHAO: Improving with diuresis. nephrology appreciated  serum creatinine fluctuating   Low platelets noted: R/O HIT, Repeat CBC in a blue top tube.   : Debility.  Plan: [place], CHF contributing. PT evaluation. recommend OTTO   Cardiorenal syndrome was present on admission, improving w. lasix.  cont Aspirin 81mg daily for Watchman, stroke secondary prevention.  - As needed, replace K>4, mg >2.2    - Continue metoprolol 50mg BID for rate control  - No further inpatient cardiac w/u planned  Patient medically cleared for discharge    	86y Male admitted w/ anasarca, urgently dropped off by family who drove him back from [place]. 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 Stroke 1 yr ago, recent falls and rapid deterioration with pmh htn, hld, CHF here for worsening confusion and sob.   Congestive heart failure, unspecified HF chronicity, unspecified heart failure type.  Plan: Diastolic HF noted. Cont Lasix. Cardiology appreciated  noted. On PO Lasix as per cardiology  ZHAO: Improving with diuresis. nephrology appreciated  serum creatinine fluctuating   Low platelets noted: R/O HIT, Repeat CBC in a blue top tube.   : Debility.  Plan: [place], CHF contributing. PT evaluation. recommend OTTO   Cardiorenal syndrome was present on admission, improving w. lasix.  cont Aspirin 81mg daily for Watchman, stroke secondary prevention.  hospital course of 16 beats of wide complex no EKG changes   - As needed, replace K>4, mg >2.2    - Continue metoprolol 50mg BID for rate control  - No further inpatient cardiac w/u planned  Patient medically cleared for discharge    	86y Male admitted w/ anasarca, urgently dropped off by family who drove him back from [place]. 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 Stroke 1 yr ago, recent falls and rapid deterioration with pmh htn, hld, CHF here for worsening confusion and sob.   Congestive heart failure, unspecified HF chronicity, unspecified heart failure type.  Plan: Diastolic HF noted. Cont Lasix. Cardiology appreciated  noted. On PO Lasix as per cardiology  ZHAO: Improving with diuresis. nephrology appreciated  serum creatinine fluctuating   Low platelets noted: R/O HIT, Repeat CBC in a blue top tube.  CT Head - no acute hemorrhage   : Debility.  Plan: [place], CHF contributing. PT evaluation. recommend OTTO villal[place] - should have repeat BMP in a few days at rehab to ensure creatinine stable  - wean O2 as tolerated  Cardiorenal syndrome was present on admission, improving w. lasix.  cont Aspirin 81mg daily for Watchman, stroke secondary prevention.  hospital course of 16 beats of wide complex no EKG changes   - As needed, replace K>4, mg >2.2    - Continue metoprolol 50mg BID for rate control  - No further inpatient cardiac w/u planned  Patient medically cleared for discharge

## 2021-08-10 NOTE — DIETITIAN INITIAL EVALUATION ADULT. - OTHER INFO
Denies nausea/vomit :  Denies difficulty chewing /swallow :  Denies diarrhea/constipation:  Last BM : 8/10 fecal incontinence  NKFA   Ht: 5'10"  Ht taken from pt  Dosing ht: N/A  Dosing wt: N/A  Ht used for BMI  Wt used for BMI 180pounds  Education Provided : STAR ed provided previously  pressure injury: sacral spine stage 1

## 2021-08-10 NOTE — PROGRESS NOTE ADULT - SUBJECTIVE AND OBJECTIVE BOX
Date of Service: 08/10/2021    S: Denies chest pain or SOB. Review of systems otherwise (-)    Review of Systems:   Constitutional: [ ] fevers, [ ] chills.   Skin: [ ] dry skin. [ ] rashes.  Psychiatric: [ ] depression, [ ] anxiety.   Gastrointestinal: [ ] BRBPR, [ ] melena.   Neurological: [ ] confusion. [ ] seizures. [ ] shuffling gait.   Ears,Nose,Mouth and Throat: [ ] ear pain [ ] sore throat.   Eyes: [ ] diplopia.   Respiratory: [ ] hemoptysis. [ ] shortness of breath  Cardiovascular: See HPI above  Hematologic/Lymphatic: [ ] anemia. [ ] painful nodes. [ ] prolonged bleeding.   Genitourinary: [ ] hematuria. [ ] flank pain.   Endocrine: [ ] significant change in weight. [ ] intolerance to heat and cold.     Review of systems [x ] otherwise negative, [ ] otherwise unable to obtain    FH: no family history of sudden cardiac death in first degree relatives    SH: [ ] tobacco, [ ] alcohol, [ ] drugs      MEDICATIONS  (STANDING):  aspirin  chewable 81 milliGRAM(s) Oral daily  heparin   Injectable 5000 Unit(s) SubCutaneous every 12 hours  magnesium sulfate  IVPB 2 Gram(s) IV Intermittent once  metoprolol tartrate 50 milliGRAM(s) Oral two times a day  nystatin Powder 1 Application(s) Topical two times a day  potassium chloride   Powder 20 milliEquivalent(s) Oral two times a day    MEDICATIONS  (PRN):      LABS:                          8.5    6.49  )-----------( 95       ( 09 Aug 2021 07:01 )             28.3     Hemoglobin: 8.5 g/dL (08-09 @ 07:01)  Hemoglobin: 8.8 g/dL (08-07 @ 06:53)  Hemoglobin: 9.0 g/dL (08-06 @ 07:22)    08-10    136  |  101  |  28<H>  ----------------------------<  180<H>  4.5   |  26  |  1.76<H>    Ca    8.3<L>      10 Aug 2021 07:23  Mg     1.5     08-10      Creatinine Trend: 1.76<--, 1.47<--, 1.58<--, 1.69<--, 1.75<--, 1.91<--             08-09-21 @ 07:01  -  08-10-21 @ 07:00  --------------------------------------------------------  IN: 720 mL / OUT: 1900 mL / NET: -1180 mL    08-10-21 @ 07:01  -  08-10-21 @ 16:01  --------------------------------------------------------  IN: 360 mL / OUT: 700 mL / NET: -340 mL        PHYSICAL EXAM  Vital Signs Last 24 Hrs  T(C): 36.6 (10 Aug 2021 11:42), Max: 36.6 (09 Aug 2021 21:19)  T(F): 97.8 (10 Aug 2021 11:42), Max: 97.9 (09 Aug 2021 21:19)  HR: 72 (10 Aug 2021 11:42) (72 - 100)  BP: 122/74 (10 Aug 2021 11:42) (101/67 - 150/79)  BP(mean): --  RR: 18 (10 Aug 2021 11:42) (18 - 18)  SpO2: 98% (10 Aug 2021 11:42) (96% - 98%)      Appearance: calm and pleasant elderly white male, oriented to name only  HEENT:   Normal oral mucosa, PERRL, EOMI	  Lymphatic: No lymphadenopathy , no edema  Cardiovascular: irregular S1 S2, No murmurs , Peripheral pulses palpable 1+ bilaterally  Respiratory: CTA B/L  Gastrointestinal:  Soft, Non-tender, + BS	  Skin: No rashes, No ecchymoses, No cyanosis, warm to touch, diffuse bruising  Musculoskeletal: limited ROM due to edema  Psychiatry:  Mood is "im fine" & affect is restricted    TELEMETRY: None  ECG:  AF, RBBB  Echo: Hyperdynamic (EF >75%), LVH, calcified aortic valve without AS, mild MR, dilated LA, elevated RA pressure.    ASSESSMENT/PLAN: 86y Male admitted w/ anasarca, urgently dropped off by family who drove him back from [place]. 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 Stroke 1 yr ago, recent falls and rapid deterioration.    - cont Aspirin 81mg daily for Watchman, stroke secondary prevention.  - volume status improved - reduce PO Lasix to 80mg daily - should have repeat BMP in a few days at rehab to ensure creatinine stable  - wean O2 as tolerated  - Cardiorenal syndrome was present on admission, improving w. lasix.  - As needed, replace K>4, mg >2.2    - Continue metoprolol 50mg BID for rate control  - No further inpatient cardiac w/u planned  - Patient to follow up in our [place] office with Dr. Reyes on Wed 9/1 at 12:10 PM ([address], [address], [place] [address] - Office #[phone])    Darwin Pickett PA-C  Pager: [phone]

## 2021-08-10 NOTE — PROGRESS NOTE ADULT - SUBJECTIVE AND OBJECTIVE BOX
[facility]   MD Neo Stein MD, D.O. Ruoru Wong, PA    From 7 AM - 5 PM:  OFFICE: [phone]  Dr. Yuan cell: [phone]  Dr. Gilliam cell: [phone]  Dr. Baez cell: [phone]  MAYA Jones cell: [phone]    From 5 PM - 7 AM: Answering Service: [phone]  Date of service: 08-10-21 @ 11:04    RENAL FOLLOW UP NOTE  --------------------------------------------------------------------------------  HPI:  Pt seen and examined at bedside.   Denies SOB, chest pain     PAST HISTORY  --------------------------------------------------------------------------------  No significant changes to PMH, PSH, FHx, SHx, unless otherwise noted    ALLERGIES & MEDICATIONS  --------------------------------------------------------------------------------  Allergies    Allergy Status Unknown    Intolerances      Standing Inpatient Medications  aspirin  chewable 81 milliGRAM(s) Oral daily  furosemide    Tablet 80 milliGRAM(s) Oral two times a day  heparin   Injectable 5000 Unit(s) SubCutaneous every 12 hours  metoprolol tartrate 50 milliGRAM(s) Oral two times a day  nystatin Powder 1 Application(s) Topical two times a day  potassium chloride   Powder 20 milliEquivalent(s) Oral two times a day    PRN Inpatient Medications      REVIEW OF SYSTEMS  --------------------------------------------------------------------------------  General: no fever  CVS: no chest pain  RESP: no sob, no cough  ABD: no abdominal pain  : no dysuria,  MSK: no edema     VITALS/PHYSICAL EXAM  --------------------------------------------------------------------------------  T(C): 36.6 (08-10-21 @ 05:26), Max: 36.6 (08-09-21 @ 21:19)  HR: 100 (08-10-21 @ 05:26) (97 - 100)  BP: 101/67 (08-10-21 @ 05:26) (101/67 - 150/79)  RR: 18 (08-10-21 @ 05:26) (18 - 18)  SpO2: 97% (08-10-21 @ 05:26) (96% - 97%)  Wt(kg): --        08-09-21 @ 07:01  -  08-10-21 @ 07:00  --------------------------------------------------------  IN: 720 mL / OUT: 1900 mL / NET: -1180 mL    08-10-21 @ 07:01  -  08-10-21 @ 11:04  --------------------------------------------------------  IN: 180 mL / OUT: 0 mL / NET: 180 mL      Physical Exam:  	Gen: NAD  	HEENT: MMM  	Pulm: CTA B/L  	CV: S1S2  	Abd: Soft, +BS  	Ext: No LE edema B/L                      Neuro: Awake   	Skin: Warm and Dry       LABS/STUDIES  --------------------------------------------------------------------------------              8.5    6.49  >-----------<  95       [08-09-21 @ 07:01]              28.3     136  |  101  |  28  ----------------------------<  180      [08-10-21 @ 07:23]  4.5   |  26  |  1.76        Ca     8.3     [08-10-21 @ 07:23]      Mg     1.5     [08-10-21 @ 07:23]    Creatinine Trend:  SCr 1.76 [08-10 @ 07:23]  SCr 1.47 [08-09 @ 07:01]  SCr 1.58 [08-07 @ 07:08]  SCr 1.69 [08-06 @ 22:10]  SCr 1.75 [08-06 @ 07:22]    Urinalysis - [08-04-21 @ 19:30]      Color Colorless / Appearance Clear / SG 1.007 / pH 6.0      Gluc Negative / Ketone Negative  / Bili Negative / Urobili Negative       Blood Trace / Protein Negative / Leuk Est Negative / Nitrite Negative      RBC 3 / WBC 0 / Hyaline 1 / Gran  / Sq Epi  / Non Sq Epi 0 / Bacteria Negative

## 2021-08-10 NOTE — DISCHARGE NOTE PROVIDER - NSDCFUADDINST_GEN_ALL_CORE_FT
recommend santyl and allevyne foam dressing to left heel daily  recommend mupirocin ointment/adaptic touch and dsd to right dorsal wound  recommend decubitus precautions and z flow offloading boots  Follow up with Podiatry outpatient    recommend santyl and allevyne foam dressing to left heel daily  recommend mupirocin ointment/adaptic touch and dsd to right dorsal wound  recommend decubitus precautions and z flow offloading boots  Follow up with Podiatry outpatient   discharge with chronic Agee change as needed

## 2021-08-10 NOTE — DISCHARGE NOTE PROVIDER - NSDCCPCAREPLAN_GEN_ALL_CORE_FT
PRINCIPAL DISCHARGE DIAGNOSIS  Diagnosis: Congestive heart failure, unspecified HF chronicity, unspecified heart failure type  Assessment and Plan of Treatment: Weigh yourself daily.  If you gain 3lbs in 3 days, or 5lbs in a week call your Health Care Provider.  Do not eat or drink foods containing more than 2000mg of salt (sodium) in your diet every day.  Call your Health Care Provider if you have any swelling or increased swelling in your feet, ankles, and/or stomach.  Take all of your medication as directed.  If you become dizzy call your Health Care Provider.        SECONDARY DISCHARGE DIAGNOSES  Diagnosis: Benign essential hypertension  Assessment and Plan of Treatment: Low salt diet  Activity as tolerated.  Take all medication as prescribed.  Follow up with your medical doctor for routine blood pressure monitoring at your next visit.  Notify your doctor if you have any of the following symptoms:   Dizziness, Lightheadedness, Blurry vision, Headache, Chest pain, Shortness of breath

## 2021-08-10 NOTE — DISCHARGE NOTE PROVIDER - CARE PROVIDER_API CALL
Misbah Yuan  INTERNAL MEDICINE  [address], [address]nd [place] [address]  Phone: [phone]  Fax: [phone]  Follow Up Time:    Misbah Yuan  INTERNAL MEDICINE  [address], [address] [place] [address]  Phone: [phone]  Fax: [phone]  Follow Up Time:     Travis Reyes)  Cardiology  [address] [place] [address], [address]  [place] [address]  Phone: [phone]  Fax: [phone]  Follow Up Time: 2 weeks

## 2021-08-10 NOTE — PROGRESS NOTE ADULT - SUBJECTIVE AND OBJECTIVE BOX
Patient is a 86y old  Male who presents with a chief complaint of Dyspnea (05 Aug 2021 13:06)    8/10/21  HPI:  Denies dyspnea  No new symptoms    PAST MEDICAL & SURGICAL HISTORY:      Review of Systems:   CONSTITUTIONAL: No fever, weight loss, or fatigue  EYES: No eye pain, visual disturbances, or discharge  ENMT:  No difficulty hearing, tinnitus, vertigo; No sinus or throat pain  NECK: No pain or stiffness  BREASTS: No pain, masses, or nipple discharge  RESPIRATORY: No cough, wheezing, chills or hemoptysis; No shortness of breath  CARDIOVASCULAR: No chest pain, palpitations, dizziness, or leg swelling  GASTROINTESTINAL: No abdominal or epigastric pain. No nausea, vomiting, or hematemesis; No diarrhea or constipation. No melena or hematochezia.  GENITOURINARY: No dysuria, frequency, hematuria, or incontinence  NEUROLOGICAL: No headaches, memory loss, loss of strength, numbness, or tremors  SKIN: No itching, burning, rashes, or lesions   LYMPH NODES: No enlarged glands  ENDOCRINE: No heat or cold intolerance; No hair loss  MUSCULOSKELETAL: No joint pain or swelling; No muscle, back, or extremity pain  PSYCHIATRIC: No depression, anxiety, mood swings, or difficulty sleeping  HEME/LYMPH: No easy bruising, or bleeding gums  ALLERY AND IMMUNOLOGIC: No hives or eczema    Allergies    Allergy Status Unknown    Intolerances        Social History:     FAMILY HISTORY:      MEDICATIONS  (STANDING):  aspirin  chewable 81 milliGRAM(s) Oral daily  furosemide   Injectable 80 milliGRAM(s) IV Push two times a day  heparin   Injectable 5000 Unit(s) SubCutaneous every 12 hours  metoprolol tartrate 25 milliGRAM(s) Oral two times a day  potassium chloride   Powder 20 milliEquivalent(s) Oral two times a day    MEDICATIONS  (PRN):        CAPILLARY BLOOD GLUCOSE        I&O's Summary    04 Aug 2021 07:  -  05 Aug 2021 07:00  --------------------------------------------------------  IN: 240 mL / OUT: 2800 mL / NET: -2560 mL    05 Aug 2021 07:01  -  05 Aug 2021 20:42  --------------------------------------------------------  IN: 960 mL / OUT: 900 mL / NET: 60 mL        PHYSICAL EXAM:  Vital Signs Last 24 Hrs  T(C): 36.4 (05 Aug 2021 17:42), Max: 36.7 (05 Aug 2021 04:10)  T(F): 97.5 (05 Aug 2021 17:42), Max: 98.1 (05 Aug 2021 04:10)  HR: 93 (05 Aug 2021 17:42) (85 - 105)  BP: 134/72 (05 Aug 2021 17:42) (116/73 - 136/64)  BP(mean): --  RR: 18 (05 Aug 2021 17:42) (18 - 18)  SpO2: 100% (05 Aug 2021 17:42) (99% - 100%)    GENERAL: NAD, well-developed  HEAD:  Atraumatic, Normocephalic  EYES: EOMI, PERRLA, conjunctiva and sclera clear  NECK: Supple, No JVD  CHEST/LUNG: Clear to auscultation bilaterally; No wheeze  HEART: Regular rate and rhythm; No murmurs, rubs, or gallops  ABDOMEN: Soft, Nontender, Nondistended; Bowel sounds present  EXTREMITIES:  2+ Peripheral Pulses, No clubbing, cyanosis, or edema  PSYCH: AAOx1  NEUROLOGY: non-focal  SKIN: No rashes or lesions    LABS:                        8.6    7.09  )-----------( 95       ( 05 Aug 2021 07:36 )             29.5     08-05    140  |  106  |  29<H>  ----------------------------<  123<H>  3.8   |  22  |  1.91<H>    Ca    8.1<L>      05 Aug 2021 07:37  Mg     1.5     08-05    TPro  5.8<L>  /  Alb  2.9<L>  /  TBili  0.5  /  DBili  x   /  AST  18  /  ALT  14  /  AlkPhos  275<H>  08-05          Urinalysis Basic - ( 04 Aug 2021 19:30 )    Color: Colorless / Appearance: Clear / S.007 / pH: x  Gluc: x / Ketone: Negative  / Bili: Negative / Urobili: Negative   Blood: x / Protein: Negative / Nitrite: Negative   Leuk Esterase: Negative / RBC: 3 /hpf / WBC 0 /HPF   Sq Epi: x / Non Sq Epi: 0 /hpf / Bacteria: Negative        RADIOLOGY & ADDITIONAL TESTS:    Imaging Personally Reviewed:    Consultant(s) Notes Reviewed:      Care Discussed with Consultants/Other Providers:

## 2021-08-10 NOTE — DIETITIAN INITIAL EVALUATION ADULT. - PERTINENT MEDS FT
MEDICATIONS  (STANDING):  aspirin  chewable 81 milliGRAM(s) Oral daily  furosemide    Tablet 80 milliGRAM(s) Oral two times a day  heparin   Injectable 5000 Unit(s) SubCutaneous every 12 hours  metoprolol tartrate 50 milliGRAM(s) Oral two times a day  nystatin Powder 1 Application(s) Topical two times a day  potassium chloride   Powder 20 milliEquivalent(s) Oral two times a day    MEDICATIONS  (PRN):

## 2021-08-10 NOTE — CONSULT NOTE ADULT - CONSULT REASON
Bilateral buttocks/sacral/perineal skin redness
consulted for assistance with medical decision making in the context of a patient with Afib
CHF
ZHAO

## 2021-08-10 NOTE — DIETITIAN INITIAL EVALUATION ADULT. - PERTINENT LABORATORY DATA
08-10 @ 07:23: Na 136, BUN 28<H>, Cr 1.76<H>, <H>, K+ 4.5, Phos --, Mg 1.5<L>, Alk Phos --, ALT/SGPT --, AST/SGOT --, HbA1c --

## 2021-08-10 NOTE — PROGRESS NOTE ADULT - PROBLEM SELECTOR PLAN 3
FU on BB, LAsix    PT evaluation
FU on Corazon CABALLERO

## 2021-08-10 NOTE — DISCHARGE NOTE NURSING/CASE MANAGEMENT/SOCIAL WORK - PATIENT PORTAL LINK FT
You can access the FollowMyHealth Patient Portal offered by [facility] by registering at the following website: [email or web address]. By joining [facility] [facility]’s FollowMyHealth portal, you will also be able to view your health information using other applications (apps) compatible with our system.

## 2021-08-10 NOTE — DISCHARGE NOTE PROVIDER - NSDCFUADDAPPT_GEN_ALL_CORE_FT
Please follow up with Cardiology in 1-2 weeks   Please follow up with PMD in 1-2 weeks  Please follow up with Cardiology in 1-2 weeks   Please follow up with PMD in 1-2 weeks   Please follow up creatinine monitoring  Please follow up with Cardiology in 1-2 weeks   Please follow up with PMD in 1-2 weeks   Please follow up creatinine monitoring at rehab    Please follow up with PMD in 1-2 weeks   Please follow up creatinine monitoring at rehab   Please follow up with Cardiology in 1-2 weeks    Please follow up in our [place] office with Dr. Reyes on Wed 9/1 at 12:10 PM ([address], [address], [place] [address] - Office #[phone]     Please follow up with PMD in 1-2 weeks   Please follow up creatinine monitoring at rehab   Please follow up with Cardiology in 1-2 weeks    Please follow up in our [place] office with Dr. Reyes on Wed 9/1 at 12:10 PM ([address], [address], [place] [address] - Office #[phone]  Follow up with podiatry outpatient    Please follow up with PMD in 1-2 weeks   Please follow up creatinine monitoring at rehab   Please follow up with Cardiology in 1-2 weeks    Please follow up in our [place] office with Dr. Reyes on Wed 9/1 at 12:10 PM ([address], [place] [address] - Office #[phone]  Follow up with podiatry outpatient   repeat Basic metabolic panel  in a few days at rehab to ensure creatinine stable  - wean O2 as tolerated in Rehab   patient sat 88%on room air place back on oxygen 1 liter 94%   wean as tolerate in Rehab

## 2021-08-10 NOTE — DISCHARGE NOTE NURSING/CASE MANAGEMENT/SOCIAL WORK - NSDCFUADDAPPT_GEN_ALL_CORE_FT
Please follow up with PMD in 1-2 weeks   Please follow up creatinine monitoring at rehab   Please follow up with Cardiology in 1-2 weeks    Please follow up in our [place] office with Dr. Reyes on Wed 9/1 at 12:10 PM ([address], [address], [place] [address] - Office #[phone]  Follow up with podiatry outpatient   repeat Basic metabolic panel  in a few days at rehab to ensure creatinine stable  - wean O2 as tolerated in Rehab   patient sat 88%on room air place back on oxygen 1 liter 94%   wean as tolerate in Rehab

## 2021-09-02 NOTE — ED PROVIDER NOTE - INTERNATIONAL TRAVEL
Verified Results  COMP METABOLIC PANEL WITH CBCA (CPNL,CBCA) [id number] 10:49SEBASTIEN JAMI JOSUE     Test Name Result Flag Reference   SODIUM 143 mmol/L  135-145   POTASSIUM 4.4 mmol/L  3.4-5.1   CHLORIDE 105 mmol/L     CARBON DIOXIDE 32 mmol/L  21-32   ANION GAP 10 mmol/L  10-20   GLUCOSE 91 mg/dl  65-99   BUN 16 mg/dl  6-20   CREATININE 0.84 mg/dl  0.67-1.17   GFR EST.AFRICAN AMER >90     eGFR results = or >90 mL/min/1.73m2 = Normal kidney function.   GFR EST.NONAFRI AMER >90     eGFR results = or >90 mL/min/1.73m2 = Normal kidney function.   BUN/CREATININE RATIO 19  7-25   BILIRUBIN TOTAL 0.5 mg/dl  0.2-1.0   GOT/AST 12 Units/L  <38   ALKALINE PHOSPHATASE 57 Units/L     ALBUMIN 4.0 g/dl  3.6-5.1   TOTAL PROTEIN 7.1 g/dl  6.4-8.2   GLOBULIN (CALCULATED) 3.1 g/dl  2.0-4.0   A/G RATIO 1.3  1.0-2.4   CALCIUM 9.0 mg/dl  8.4-10.2   GPT/ALT 27 Units/L  <79   FASTING STATUS 11 hrs       LIPID PNL W/ RFX [id number] 10:49SEBASTIEN JAMI JOSUE     Test Name Result Flag Reference   FASTING STATUS 11 hrs     CHOLESTEROL 236 mg/dl H <200   Desirable            <200  Borderline High      200 to 239  High                 >=240   HDL CHOLESTEROL 39 mg/dl L >39   Low            <40  Borderline Low 40 to 49  Near Optimal   50 to 59  Optimal        >=60   TRIGLYCERIDES 175 mg/dl H <150   Normal                   <150  Borderline High          150 to 199  High                     200 to 499  Very High                >=500   LDL CHOLESTEROL (CALCULATED) 162 mg/dl H <130   OPTIMAL               <100  NEAR OPTIMAL          100-129  BORDERLINE HIGH       130-159  HIGH                  160-189  VERY HIGH             >=190   NON-HDL CHOLESTEROL 197 mg/dl     Therapeutic Target:  CHD and risk equivalents <130  Multiple risk factors    <160  0 to 1 risk factors      <190   CHOLESTEROL/HDL RATIO 6.1 H <4.5     PSA - PROSTATE SPECIFIC AG [id number] 10:49JAMI TAO     Test Name Result Flag Reference   PROSTATE SPECIFIC AG 0.77 ng/ml  <4.01    SUGGESTED AGE SPECIFIC REFERENCE RANGES     AGE                NG/ML  40-49              0.01-2.50  50-59              0.01-3.50  60-69              0.01-4.50  70-79              0.01-6.50     REFERENCE: JOURNAL OF UROLOGY 155, [id number]     [employer] Chemiluminescence     CBC WITH AUTOMATED DIFFERENTIAL [id number] 10:49AM JAMI JOSUE     Test Name Result Flag Reference   WHITE BLOOD COUNT 6.4 K/mcL  4.2-11.0   RED CELL COUNT 5.19 mil/mcL  4.50-5.90   HEMOGLOBIN 14.5 g/dl  13.0-17.0   HEMATOCRIT 43.6 %  39.0-51.0   MEAN CORPUSCULAR VOLUME 84.0 fL  78.0-100.0   MEAN CORPUSCULAR HEMOGLOBIN 27.9 pg  26.0-34.0   MEAN CORPUSCULAR HGB CONC 33.3 g/dl  32.0-36.5   RDW-CV 11.9 %  11.0-15.0   PLATELET COUNT 257 K/mcL  140-450   CLARICE% 59 %     LYM% 32 %     MON% 7 %     EOS% 1 %     BASO% 1 %     CLARICE ABS 3.7 K/mcL  1.8-7.7   LYM ABS 2.0 K/mcL  1.0-4.8   MON ABS 0.5 K/mcL  0.3-0.9   EOS ABS 0.1 K/mcL  0.1-0.5   BASO ABS 0.1 K/mcL  0.0-0.3   DIFF TYPE      AUTOMATED DIFFERENTIAL   NRBC 0 /100 WBC  0   PERCENT IMMATURE GRANULOCYTES 0 %     ABSOLUTE IMMATURE GRANULOCYTES 0.0 K/mcL  0-0.2       Signatures   Electronically signed by : JAMI JOSUE M.D.; Jul 27 2018  9:14AM CST     Unable to Assess

## 2021-09-02 NOTE — ED PROVIDER NOTE - NSICDXPASTMEDICALHX_GEN_ALL_CORE_FT
PAST MEDICAL HISTORY:  Anemia     Carotid stenosis     Coronary artery disease due to calcified coronary lesion     DM (diabetes mellitus)     Former smoker     HLD (hyperlipidemia)     HTN (hypertension)     MI (myocardial infarction)     PAF (paroxysmal atrial fibrillation)     Stented coronary artery

## 2021-09-02 NOTE — ED ADULT NURSE REASSESSMENT NOTE - NS ED NURSE REASSESS COMMENT FT1
16 fr indwelling baig inserted utilizing aseptic technique, ~1200cc urine output noted, urine clear and pink tinged.

## 2021-09-02 NOTE — H&P ADULT - HISTORY OF PRESENT ILLNESS
This is a 85 y/o M with pmhx of dCHF, Stroke 1 year ago, afib, presented to the ED for AMS, cannot provide history. The patient was at a skilled nursing facility where he was found to have AMS. He was found to have hypoglycemia and was send to the ED. When the patient arrived, a rectal temp was attempted and it was noted to have maroon colored stool. He was given 3U pRBC by the ED. The patient was then made DNR/DNI by the family members.    I had spoken with the family members, he has no quality of life left, had been deteriorating from the last 3 months.

## 2021-09-02 NOTE — H&P ADULT - PROBLEM SELECTOR PLAN 3
Assessment:  - found to have maroon colored stool on rectal exam  - Hb 8.2, stable as per his previous values  - etiology concerning for ischemic bowel disease (due to afib) vs. GI bleed    Plan:  - family member request to defer treatment

## 2021-09-02 NOTE — ED PROVIDER NOTE - CONSTITUTIONAL, MLM
normal... frail, pale appearing elderly male, with eyes and mouth open, not responding to verbal or tactile stimuli

## 2021-09-02 NOTE — ED PROVIDER NOTE - SKIN, MLM
Skin normal color for race, warm, dry and intact. scattered ecchymosis on b/l UE with scattered skin tears

## 2021-09-02 NOTE — ED PROVIDER NOTE - CLINICAL SUMMARY MEDICAL DECISION MAKING FREE TEXT BOX
87yo M with numerous comorbidities presenting from [facility] with altered mental status. had been found non-responsive, hypoglycemic and hypotensive with improvement in both following dextrose and IVF. will obtain sepsis order set and admit

## 2021-09-02 NOTE — H&P ADULT - NSHPLABSRESULTS_GEN_ALL_CORE
8.2    13.28 )-----------( 101      ( 02 Sep 2021 17:42 )             26.7       09    140  |  109<H>  |  71<H>  ----------------------------<  36<LL>  5.7<H>   |  16<L>  |  3.40<H>    Ca    7.8<L>      02 Sep 2021 20:06    TPro  6.2  /  Alb  3.0<L>  /  TBili  0.6  /  DBili  x   /  AST  26  /  ALT  17  /  AlkPhos  266<H>  09            PT/INR - ( 02 Sep 2021 17:42 )   PT: 13.4 sec;   INR: 1.17 ratio         PTT - ( 02 Sep 2021 17:42 )  PTT:29.2 sec    17:42 - VBG - pH: 7.24  | pCO2: 48    | pO2: 37    | Lactate: 2.2        Urinalysis Basic - ( 02 Sep 2021 19:55 )    Color: Light Yellow / Appearance: Clear / S.012 / pH: x  Gluc: x / Ketone: Negative  / Bili: Negative / Urobili: <2 mg/dL   Blood: x / Protein: Trace / Nitrite: Negative   Leuk Esterase: Small / RBC: 31 /HPF / WBC 7 /HPF   Sq Epi: x / Non Sq Epi: 0 /HPF / Bacteria: Negative

## 2021-09-02 NOTE — H&P ADULT - NSHPPHYSICALEXAM_GEN_ALL_CORE
PHYSICAL EXAM:  VITALS: Vital Signs Last 24 Hrs  T(C): 35.4 (02 Sep 2021 21:04), Max: 37.2 (02 Sep 2021 18:31)  T(F): 95.7 (02 Sep 2021 21:04), Max: 98.9 (02 Sep 2021 18:31)  HR: 124 (02 Sep 2021 21:04) (92 - 127)  BP: 129/81 (02 Sep 2021 21:04) (110/74 - 148/95)  BP(mean): --  RR: 23 (02 Sep 2021 21:04) (15 - 25)  SpO2: 100% (02 Sep 2021 21:04) (100% - 100%)  GENERAL: not responding to commands or questions, labored breathing  HEAD:  Atraumatic, Normocephalic  EYES: PERRL, conjunctiva and sclera clear  ENT: Moist Mucus Membranes present, no ulcers appreciated  NECK: Supple, No JVD  CHEST/LUNG: Clear to auscultation bilaterally; No wheezes, rales or rhonchi  HEART: Regular rate and rhythm; No murmurs, rubs, or gallops, (+)S1, S2  ABDOMEN: Soft, Nontender, Nondistended; Normal Bowel sounds   EXTREMITIES:  + venous stasis changes b/l, No clubbing, cyanosis, or edema  PSYCH: normal mood and affect  NEUROLOGY: AAOx3, non-focal  SKIN: No rashes or lesions  RECTAL: maroon colored stools

## 2021-09-02 NOTE — ED ADULT NURSE NOTE - OBJECTIVE STATEMENT
Facilitator RN: Pt arrived from [facility] for unresponsiveness, PMHX A.fib CVA, [place]. As per [facility], pt was confused, has sudden acute onset of AMS (unknown mental status) accompanied with Severe hypoglycemia with no change to multiple doses of Glucagon. Upon arrival, pt is lethargic, b/l pinpoint pupils noted, was given Narcan as emergent intervention. Pt received with 20g IV on right ac and left ac, +blood return, flushes without difficulty. Emergent uncrossed PRBC given at this time as per MD. Currently MICHAEL.fib on cardiac monitor, normotensive, skin dry and pale, multiple wounds noted to left arm. Pt turned and repositioned, bright red blood noted to rectum, MD aware

## 2021-09-02 NOTE — H&P ADULT - ASSESSMENT
This is a 87 y/o M with pmhx of dCHF, Stroke 1 year ago, afib, presented to the ED for AMS, cannot provide history. Found to ahve GI bleed and sepsis. Admitted for AMS and sepsis work up

## 2021-09-02 NOTE — ED PROVIDER NOTE - OBJECTIVE STATEMENT
85yo M Hx of HTN, CAD, CHF, urinary retention, CVA presenting from [facility] for altered mental status. per EMS pt was last seen normal at 12PM, when they checked on him one hour prior to arrival pt was altered, not responding. dstick of 44, given glucagon an d10 with improvement to 180. 87yo M Hx of HTN, CAD, CHF, urinary retention, CVA presenting from [facility] for altered mental status. per EMS pt was last seen normal at 12PM, when they checked on him one hour prior to arrival pt was altered, not responding. dstick of 44, given glucagon an d10 with improvement to 180 but remained altered. pt arrives with eyes open, on NRB spontneously breathing, tachycardic with stable blood pressure. not following commands or responding to veral stimuli.

## 2021-09-02 NOTE — H&P ADULT - PROBLEM SELECTOR PLAN 4
Assessment:  - the patient is febrile, tachycardic and has leukocytosis, concerning for sepsis secondary to GI bleed vs. ischemic bowel disease    Plan:  - family member request to defer treatment

## 2021-09-02 NOTE — H&P ADULT - PROBLEM SELECTOR PLAN 2
Assessment:  - the patient has a poor prognosis due to active sepsis and GI bleed, leading to hemodynamic instability and multi organ failure  - poor quality of life because of stroke  - Son ([phone]) and daughter ([phone]) signed MOLST form for DNR/DNI    Plan:  I had an extensive discussion over phone conversation with both the son and daughter. They still want the patient DNR/DNI. They want him to not be in pain when he passes. They also believe that since he had been worsening acutely in the last 3 months, he has no quality of life left and feels that if we treat his underlying issues, it would prolong his suffering. They do not want me to place an NG tube to treat his hyperkalemia. In addition, I explained to them that treating his sepsis with antibiotics would not change his prognosis, and they agree to not start antibiotics. In addition, they agree that he should not receive any invasive procedures for his GI bleed. The son and daughter's main priority is to make the patient comfortable and to allow  him to pass away peacefully. They do not want blood draws.     - Will call pain and palliative consult for more support services  - can give morphine 2mg Q4hr PRN for respiratory distress  - no lab draws  - no abx  - no NG tube Assessment:  - the patient has a poor prognosis due to active sepsis and GI bleed, leading to hemodynamic instability and multi organ failure  - poor quality of life because of stroke  - Son ([phone]) and daughter ([phone]) signed MOLST form for DNR/DNI    Plan:  I had an extensive discussion over phone conversation with both the son and daughter. They still want the patient DNR/DNI. They want him to not be in pain when he passes. They also believe that since he had been worsening acutely in the last 3 months, he has no quality of life left and feels that if we treat his underlying issues, it would prolong his suffering. They do not want me to place an NG tube to treat his hyperkalemia. In addition, I explained to them that treating his sepsis with antibiotics would not change his prognosis, and they agree to not start antibiotics. In addition, they agree that he should not receive any invasive procedures for his GI bleed. The son and daughter's main priority is to make the patient comfortable and to allow  him to pass away peacefully. They do not want blood draws.     - Will call pain and palliative consult for more support services  - can give morphine 2mg Q6hr PRN for respiratory distress  - no lab draws  - no abx  - no NG tube Assessment:  - the patient has a poor prognosis due to active sepsis and GI bleed, leading to hemodynamic instability and multi organ failure  - poor quality of life because of stroke  - Son ([phone]) and daughter ([phone]) signed MOLST form for DNR/DNI    Plan:  I had an extensive discussion over phone conversation with both the son and daughter. They still want the patient DNR/DNI. They want him to not be in pain when he passes. They also believe that since he had been worsening acutely in the last 3 months, he has no quality of life left and feels that if we treat his underlying issues, it would prolong his suffering. They do not want me to place an NG tube to treat his hyperkalemia, as it can cause cardiac arrest. In addition, I explained to them that treating his sepsis with antibiotics would not change his prognosis, and they agree to not start antibiotics. In addition, they agree that he should not receive any invasive procedures for his GI bleed. The son and daughter's main priority is to make the patient comfortable and to allow  him to pass away peacefully. They do not want blood draws.     - Will call pain and palliative consult for more support services  - can give morphine 2mg Q6hr PRN for respiratory distress  - no lab draws  - no abx  - no NG tube

## 2021-09-02 NOTE — CHART NOTE - NSCHARTNOTEFT_GEN_A_CORE
Had a brief goals of care discussion with daughter Barbara Yuen.  See 8/5/21 for more comprehensive goals of care discussion.    Reaffirmed the family's wishes for DNR/DNI and "no invasive measures".  LARISSA completed, placed in chart, DNR/DNI orders placed.  Further discussions regarding disposition to follow, as patient is likely appropriate for hospice.    Dr. Coffey also witness and party to these conversations as daytime MAR.    -Juan Grande M.D.   PGY-3 EM/IM   Pager #[phone]

## 2021-09-02 NOTE — ED PROVIDER NOTE - ATTENDING CONTRIBUTION TO CARE
agree with resident note    " 87yo M Hx of HTN, CAD, CHF, urinary retention, CVA presenting from [facility] for altered mental status. per EMS pt was last seen normal at 12PM, when they checked on him one hour prior to arrival pt was altered, not responding. dstick of 44, given glucagon an d10 with improvement to 180 but remained altered. pt arrives with eyes open, on NRB spontneously breathing, tachycardic with stable blood pressure. not following commands or responding to veral stimuli"    PE: altered; not speaking; unresponsive; tachycardic; normotensive; CTAB/L; s1 s2 no m/r/g abd soft/ND rectal: large melanotic stool filling diaper ext: no edema    Imp: GI bleed; 2U uncrossed PBRCs; family notified critical status; pt is DNR/DNI and family would like the least amount of aggressive care (PBRCs at this time ok) and aggressive palliative care (pain control, etc); given this will cancel CTA as no intervention wanted by pt (when he had capacities) or family

## 2021-09-02 NOTE — H&P ADULT - PROBLEM SELECTOR PLAN 1
Assessment:  - patient presented with AMS likely secondary to active sepsis and GI bleed    Plan:  - patient's family members would like comfort care, no treatment for sepsis and GI bleed, would like the patient to pass peacefully without pain. Please refer to goals of care discussion below.

## 2021-09-02 NOTE — ED PROVIDER NOTE - PROGRESS NOTE DETAILS
cheryl gallardo pgy3: when rolling the pt to change and check rectal temperature, large amount of melanotic stool with clots present. blood pressure stable at this time but had been hypotensive on EMS arrival s/p 1L NS. will give 1u pRBC, CTA and GI was paged.

## 2021-09-02 NOTE — ED ADULT NURSE NOTE - NSIMPLEMENTINTERV_GEN_ALL_ED
Implemented All Fall with Harm Risk Interventions:  [place] to call system. Call bell, personal items and telephone within reach. Instruct patient to call for assistance. Room bathroom lighting operational. Non-slip footwear when patient is off stretcher. Physically safe environment: no spills, clutter or unnecessary equipment. Stretcher in lowest position, wheels locked, appropriate side rails in place. Provide visual cue, wrist band, yellow gown, etc. Monitor gait and stability. Monitor for mental status changes and reorient to person, place, and time. Review medications for side effects contributing to fall risk. Reinforce activity limits and safety measures with patient and family. Provide visual clues: red socks.

## 2021-09-02 NOTE — H&P ADULT - PROBLEM SELECTOR PLAN 5
Assessment:  - new onset ZHAO likely due to active sepsis, leading to hyperkalemia  - Cr 3.4, baseline 2.0    Plan:  - family member request to defer treatment

## 2021-09-02 NOTE — ED CLERICAL - NS ED CLERK NOTE PRE-ARRIVAL INFORMATION; ADDITIONAL PRE-ARRIVAL INFORMATION
This patient is enrolled in the readmission program and has active care navigation. This patient can be followed up by the care navigation team within 24 hours. To arrange close follow-up or to obtain additional clinical information about this patient, please call the contact number above. This patient is enrolled in the readmission program and has active care navigation. This patient can be followed up by the care navigation team within 24 hours. To arrange close follow-up or to obtain additional clinical information about this patient, please call the contact number above.        Gallo martinez call in note Dr. Adames.  Pt found unresponsive and hypoglycemic glucose 40mg/dl was given glucagon with no response in mental stauts. Pt is DNR/DNI.

## 2021-09-03 NOTE — DISCHARGE NOTE FOR THE EXPIRED PATIENT - HOSPITAL COURSE
This is a 85 y/o M with pmhx of dCHF, Stroke 1 year ago, afib, presented to the ED for AMS, cannot provide history. The patient was at a skilled nursing facility where he was found to have AMS. Pt noted with hypoglycemia, concern for Sepsis d/t pt being, febrile, tachycardic and has leukocytosis, concerning for sepsis secondary to GI (maroon colored stool noted). Pt s/p 3 unit PRBC in ED, s/p Meropenem, Vancomycin x 1 dose. GOc discussion with the family, decision reached by the family to proceed with comfort care, DNI/DNR, MIOLST completed.     9/3-Pt unresponsive to verbal/noxious stimuli, pupil fixed and dilated, no spontaneous breathing noted. Pt w/o palpable carotid and radial pulse. No heart or breath sounds. Pt daughter Barbara notified, all questions answered. Attending Dr. Cm made aware.

## 2021-09-08 LAB
CULTURE RESULTS: SIGNIFICANT CHANGE UP
CULTURE RESULTS: SIGNIFICANT CHANGE UP
SPECIMEN SOURCE: SIGNIFICANT CHANGE UP
SPECIMEN SOURCE: SIGNIFICANT CHANGE UP

## 2021-12-09 NOTE — PROGRESS NOTE ADULT - PROBLEM/PLAN-4
Detail Level: Detailed
Additional Notes: Patient has received the COVID-19 vaccine.
Quality 110: Preventive Care And Screening: Influenza Immunization: Influenza Immunization previously received during influenza season
DISPLAY PLAN FREE TEXT

## 2022-03-24 NOTE — ED PROVIDER NOTE - NS ED MD TWO NIGHTS YN
----- Message from Maddi Phillips MD sent at 3/24/2022 12:30 PM CDT -----  Please call with prelim culture results showing coag neg staph. How is arm doing? If not improving please add mupirocin ointment twice daily. Thank you.        Yes

## 2024-01-22 NOTE — PATIENT PROFILE ADULT. - SOCIAL CONCERNS
more exercise. Walking is a good choice. Bit by bit, increase the amount you walk every day. Try for at least 30 minutes on most days of the week. You also may want to swim, bike, or do other activities.     Do not smoke. If you need help quitting, talk to your doctor about stop-smoking programs and medicines. These can increase your chances of quitting for good. Quitting smoking may be the most important step you can take to protect your heart. It is never too late to quit.     Limit alcohol to 2 drinks a day for men and 1 drink a day for women. Too much alcohol can cause health problems.     Manage other health problems such as diabetes, high blood pressure, and high cholesterol. If you think you may have a problem with alcohol or drug use, talk to your doctor.   Medicines    Take your medicines exactly as prescribed. Call your doctor if you think you are having a problem with your medicine.     If your doctor recommends aspirin, take the amount directed each day. Make sure you take aspirin and not another kind of pain reliever, such as acetaminophen (Tylenol).   When should you call for help?   Call 911 if you have symptoms of a heart attack. These may include:    Chest pain or pressure, or a strange feeling in the chest.     Sweating.     Shortness of breath.     Pain, pressure, or a strange feeling in the back, neck, jaw, or upper belly or in one or both shoulders or arms.     Lightheadedness or sudden weakness.     A fast or irregular heartbeat.   After you call 911, the  may tell you to chew 1 adult-strength or 2 to 4 low-dose aspirin. Wait for an ambulance. Do not try to drive yourself.  Watch closely for changes in your health, and be sure to contact your doctor if you have any problems.  Where can you learn more?  Go to https://www.healthwise.net/patientEd and enter F075 to learn more about \"A Healthy Heart: Care Instructions.\"  Current as of: June 25, 2023               Content Version: 13.9  © 
None

## 2024-02-26 NOTE — PROVIDER CONTACT NOTE (CRITICAL VALUE NOTIFICATION) - RECOMMENDATIONS
You are a healthy person.  Your vaccines are up to date.  Follow up in 1 year for the next well visit.    Have a safe and healthy year!     pt receiving PRBC

## 2024-04-09 NOTE — ED PROVIDER NOTE - NS ED ATTENDING STATEMENT MOD
Full Procedure I have personally performed a face to face diagnostic evaluation on this patient. I have reviewed the ACP note and agree with the history, exam and plan of care, except as noted.